# Patient Record
Sex: FEMALE | Race: WHITE | Employment: FULL TIME | URBAN - METROPOLITAN AREA
[De-identification: names, ages, dates, MRNs, and addresses within clinical notes are randomized per-mention and may not be internally consistent; named-entity substitution may affect disease eponyms.]

---

## 2023-02-17 ENCOUNTER — APPOINTMENT (OUTPATIENT)
Dept: CT IMAGING | Facility: HOSPITAL | Age: 73
End: 2023-02-17
Attending: EMERGENCY MEDICINE
Payer: MEDICARE

## 2023-02-17 ENCOUNTER — HOSPITAL ENCOUNTER (EMERGENCY)
Facility: HOSPITAL | Age: 73
Discharge: HOME | End: 2023-02-17
Attending: EMERGENCY MEDICINE
Payer: MEDICARE

## 2023-02-17 VITALS
HEIGHT: 62 IN | TEMPERATURE: 98 F | OXYGEN SATURATION: 96 % | RESPIRATION RATE: 20 BRPM | WEIGHT: 205 LBS | SYSTOLIC BLOOD PRESSURE: 158 MMHG | HEART RATE: 96 BPM | BODY MASS INDEX: 37.73 KG/M2 | DIASTOLIC BLOOD PRESSURE: 67 MMHG

## 2023-02-17 DIAGNOSIS — M47.24 THORACIC RADICULOPATHY DUE TO OSTEOARTHRITIS OF SPINE: Primary | ICD-10-CM

## 2023-02-17 LAB
ALBUMIN SERPL-MCNC: 3.7 G/DL (ref 3.4–5)
ALBUMIN/GLOB SERPL: 0.9 {RATIO} (ref 1–2)
ALP LIVER SERPL-CCNC: 193 U/L
ALT SERPL-CCNC: 29 U/L
ANION GAP SERPL CALC-SCNC: 3 MMOL/L (ref 0–18)
AST SERPL-CCNC: 37 U/L (ref 15–37)
BASOPHILS # BLD AUTO: 0.04 X10(3) UL (ref 0–0.2)
BASOPHILS NFR BLD AUTO: 0.4 %
BILIRUB SERPL-MCNC: 0.7 MG/DL (ref 0.1–2)
BILIRUB UR QL STRIP.AUTO: NEGATIVE
BUN BLD-MCNC: 27 MG/DL (ref 7–18)
CALCIUM BLD-MCNC: 9.3 MG/DL (ref 8.5–10.1)
CHLORIDE SERPL-SCNC: 109 MMOL/L (ref 98–112)
CLARITY UR REFRACT.AUTO: CLEAR
CO2 SERPL-SCNC: 24 MMOL/L (ref 21–32)
COLOR UR AUTO: YELLOW
CREAT BLD-MCNC: 1.05 MG/DL
EOSINOPHIL # BLD AUTO: 0.26 X10(3) UL (ref 0–0.7)
EOSINOPHIL NFR BLD AUTO: 2.9 %
ERYTHROCYTE [DISTWIDTH] IN BLOOD BY AUTOMATED COUNT: 13.1 %
GFR SERPLBLD BASED ON 1.73 SQ M-ARVRAT: 57 ML/MIN/1.73M2 (ref 60–?)
GLOBULIN PLAS-MCNC: 4 G/DL (ref 2.8–4.4)
GLUCOSE BLD-MCNC: 159 MG/DL (ref 70–99)
GLUCOSE UR STRIP.AUTO-MCNC: NEGATIVE MG/DL
HCT VFR BLD AUTO: 41.4 %
HGB BLD-MCNC: 13.6 G/DL
IMM GRANULOCYTES # BLD AUTO: 0.04 X10(3) UL (ref 0–1)
IMM GRANULOCYTES NFR BLD: 0.4 %
INR BLD: 1.06 (ref 0.85–1.16)
KETONES UR STRIP.AUTO-MCNC: NEGATIVE MG/DL
LEUKOCYTE ESTERASE UR QL STRIP.AUTO: NEGATIVE
LIPASE SERPL-CCNC: 17 U/L (ref 13–75)
LIPASE SERPL-CCNC: 63 U/L (ref 73–393)
LYMPHOCYTES # BLD AUTO: 0.85 X10(3) UL (ref 1–4)
LYMPHOCYTES NFR BLD AUTO: 9.4 %
MCH RBC QN AUTO: 31.1 PG (ref 26–34)
MCHC RBC AUTO-ENTMCNC: 32.9 G/DL (ref 31–37)
MCV RBC AUTO: 94.5 FL
MONOCYTES # BLD AUTO: 0.77 X10(3) UL (ref 0.1–1)
MONOCYTES NFR BLD AUTO: 8.5 %
NEUTROPHILS # BLD AUTO: 7.11 X10 (3) UL (ref 1.5–7.7)
NEUTROPHILS # BLD AUTO: 7.11 X10(3) UL (ref 1.5–7.7)
NEUTROPHILS NFR BLD AUTO: 78.4 %
NITRITE UR QL STRIP.AUTO: NEGATIVE
OSMOLALITY SERPL CALC.SUM OF ELEC: 290 MOSM/KG (ref 275–295)
PH UR STRIP.AUTO: 5 [PH] (ref 5–8)
PLATELET # BLD AUTO: 261 10(3)UL (ref 150–450)
POTASSIUM SERPL-SCNC: 5 MMOL/L (ref 3.5–5.1)
PROT SERPL-MCNC: 7.7 G/DL (ref 6.4–8.2)
PROT UR STRIP.AUTO-MCNC: NEGATIVE MG/DL
PROTHROMBIN TIME: 13.8 SECONDS (ref 11.6–14.8)
RBC # BLD AUTO: 4.38 X10(6)UL
RBC UR QL AUTO: NEGATIVE
SARS-COV-2 RNA RESP QL NAA+PROBE: NOT DETECTED
SODIUM SERPL-SCNC: 136 MMOL/L (ref 136–145)
SP GR UR STRIP.AUTO: 1.02 (ref 1–1.03)
UROBILINOGEN UR STRIP.AUTO-MCNC: <2 MG/DL
WBC # BLD AUTO: 9.1 X10(3) UL (ref 4–11)

## 2023-02-17 PROCEDURE — 81003 URINALYSIS AUTO W/O SCOPE: CPT | Performed by: EMERGENCY MEDICINE

## 2023-02-17 PROCEDURE — 85610 PROTHROMBIN TIME: CPT | Performed by: EMERGENCY MEDICINE

## 2023-02-17 PROCEDURE — 85025 COMPLETE CBC W/AUTO DIFF WBC: CPT

## 2023-02-17 PROCEDURE — 99285 EMERGENCY DEPT VISIT HI MDM: CPT

## 2023-02-17 PROCEDURE — 96374 THER/PROPH/DIAG INJ IV PUSH: CPT

## 2023-02-17 PROCEDURE — 80053 COMPREHEN METABOLIC PANEL: CPT | Performed by: EMERGENCY MEDICINE

## 2023-02-17 PROCEDURE — 80053 COMPREHEN METABOLIC PANEL: CPT

## 2023-02-17 PROCEDURE — 83690 ASSAY OF LIPASE: CPT | Performed by: EMERGENCY MEDICINE

## 2023-02-17 PROCEDURE — 96375 TX/PRO/DX INJ NEW DRUG ADDON: CPT

## 2023-02-17 PROCEDURE — 81003 URINALYSIS AUTO W/O SCOPE: CPT

## 2023-02-17 PROCEDURE — 74174 CTA ABD&PLVS W/CONTRAST: CPT | Performed by: EMERGENCY MEDICINE

## 2023-02-17 PROCEDURE — 96361 HYDRATE IV INFUSION ADD-ON: CPT

## 2023-02-17 PROCEDURE — 85025 COMPLETE CBC W/AUTO DIFF WBC: CPT | Performed by: EMERGENCY MEDICINE

## 2023-02-17 PROCEDURE — 96376 TX/PRO/DX INJ SAME DRUG ADON: CPT

## 2023-02-17 PROCEDURE — 83690 ASSAY OF LIPASE: CPT

## 2023-02-17 PROCEDURE — 71275 CT ANGIOGRAPHY CHEST: CPT | Performed by: EMERGENCY MEDICINE

## 2023-02-17 RX ORDER — CYCLOBENZAPRINE HCL 10 MG
10 TABLET ORAL 3 TIMES DAILY PRN
Qty: 20 TABLET | Refills: 0 | Status: SHIPPED | OUTPATIENT
Start: 2023-02-17 | End: 2023-02-24

## 2023-02-17 RX ORDER — DIAZEPAM 5 MG/1
5 TABLET ORAL ONCE
Status: COMPLETED | OUTPATIENT
Start: 2023-02-17 | End: 2023-02-17

## 2023-02-17 RX ORDER — HYDROCODONE BITARTRATE AND ACETAMINOPHEN 5; 325 MG/1; MG/1
1-2 TABLET ORAL EVERY 6 HOURS PRN
Qty: 10 TABLET | Refills: 0 | Status: SHIPPED | OUTPATIENT
Start: 2023-02-17 | End: 2023-02-22

## 2023-02-17 RX ORDER — SODIUM CHLORIDE 9 MG/ML
INJECTION, SOLUTION INTRAVENOUS CONTINUOUS
Status: DISCONTINUED | OUTPATIENT
Start: 2023-02-17 | End: 2023-02-17

## 2023-02-17 RX ORDER — HYDROMORPHONE HYDROCHLORIDE 1 MG/ML
INJECTION, SOLUTION INTRAMUSCULAR; INTRAVENOUS; SUBCUTANEOUS
Status: COMPLETED
Start: 2023-02-17 | End: 2023-02-17

## 2023-02-17 RX ORDER — ONDANSETRON 2 MG/ML
4 INJECTION INTRAMUSCULAR; INTRAVENOUS ONCE
Status: COMPLETED | OUTPATIENT
Start: 2023-02-17 | End: 2023-02-17

## 2023-02-17 RX ORDER — METHYLPREDNISOLONE 4 MG/1
TABLET ORAL
Qty: 1 EACH | Refills: 0 | Status: SHIPPED | OUTPATIENT
Start: 2023-02-17

## 2023-02-17 RX ORDER — DEXAMETHASONE SODIUM PHOSPHATE 10 MG/ML
10 INJECTION, SOLUTION INTRAMUSCULAR; INTRAVENOUS ONCE
Status: COMPLETED | OUTPATIENT
Start: 2023-02-17 | End: 2023-02-17

## 2023-02-17 RX ORDER — HYDROMORPHONE HYDROCHLORIDE 1 MG/ML
0.5 INJECTION, SOLUTION INTRAMUSCULAR; INTRAVENOUS; SUBCUTANEOUS ONCE
Status: COMPLETED | OUTPATIENT
Start: 2023-02-17 | End: 2023-02-17

## 2023-02-17 RX ORDER — KETOROLAC TROMETHAMINE 15 MG/ML
15 INJECTION, SOLUTION INTRAMUSCULAR; INTRAVENOUS ONCE
Status: COMPLETED | OUTPATIENT
Start: 2023-02-17 | End: 2023-02-17

## 2023-02-17 NOTE — ED INITIAL ASSESSMENT (HPI)
Pt in with L flank pain x 1 week. No urinary S/S. Seen at [address] [place] [address] Saturday for same and diagnosed with UTI and given abx.

## 2023-02-18 NOTE — DISCHARGE INSTRUCTIONS
Bone spurring present in the spine may be causig these symptoms-   Call to make an appointment to follow with your physician and you may need to follow with spine surgery.   Dr Jose Gallardo is excellent and with [facility]

## 2023-07-13 ENCOUNTER — HOSPITAL ENCOUNTER (EMERGENCY)
Facility: HOSPITAL | Age: 73
Discharge: LEFT BEFORE DISCHARGE | End: 2023-07-13
Payer: MEDICARE

## 2023-07-13 ENCOUNTER — APPOINTMENT (OUTPATIENT)
Dept: GENERAL RADIOLOGY | Facility: HOSPITAL | Age: 73
End: 2023-07-13
Payer: MEDICARE

## 2023-07-13 VITALS
DIASTOLIC BLOOD PRESSURE: 48 MMHG | HEIGHT: 63 IN | OXYGEN SATURATION: 93 % | SYSTOLIC BLOOD PRESSURE: 94 MMHG | WEIGHT: 190 LBS | BODY MASS INDEX: 33.66 KG/M2 | HEART RATE: 113 BPM | RESPIRATION RATE: 19 BRPM | TEMPERATURE: 98 F

## 2023-07-13 PROCEDURE — 71045 X-RAY EXAM CHEST 1 VIEW: CPT

## 2023-07-13 RX ORDER — ATORVASTATIN CALCIUM 40 MG/1
40 TABLET, FILM COATED ORAL NIGHTLY
COMMUNITY

## 2023-07-13 RX ORDER — HYDROCODONE BITARTRATE AND ACETAMINOPHEN 5; 300 MG/1; MG/1
TABLET ORAL EVERY 4 HOURS PRN
COMMUNITY

## 2023-07-13 RX ORDER — AMLODIPINE BESYLATE 10 MG/1
10 TABLET ORAL DAILY
COMMUNITY
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
## 2023-12-18 RX ORDER — ASPIRIN 81 MG/1
81 TABLET ORAL DAILY
COMMUNITY
End: 2024-01-20

## 2023-12-18 RX ORDER — ACETAMINOPHEN AND CODEINE PHOSPHATE 300; 30 MG/1; MG/1
1 TABLET ORAL EVERY 4 HOURS PRN
Status: ON HOLD | COMMUNITY

## 2023-12-18 RX ORDER — DIPHENHYDRAMINE HCL 25 MG
25 CAPSULE ORAL EVERY 6 HOURS PRN
Status: ON HOLD | COMMUNITY

## 2024-01-04 ENCOUNTER — EKG ENCOUNTER (OUTPATIENT)
Dept: LAB | Age: 74
End: 2024-01-04
Attending: ORTHOPAEDIC SURGERY
Payer: MEDICARE

## 2024-01-04 ENCOUNTER — LAB ENCOUNTER (OUTPATIENT)
Dept: LAB | Age: 74
End: 2024-01-04
Attending: ORTHOPAEDIC SURGERY
Payer: MEDICARE

## 2024-01-04 DIAGNOSIS — Z01.818 PRE-OP TESTING: ICD-10-CM

## 2024-01-04 LAB
ALBUMIN SERPL-MCNC: 3.7 G/DL (ref 3.4–5)
ALBUMIN/GLOB SERPL: 1 {RATIO} (ref 1–2)
ALP LIVER SERPL-CCNC: 180 U/L
ALT SERPL-CCNC: 50 U/L
ANION GAP SERPL CALC-SCNC: 5 MMOL/L (ref 0–18)
ANTIBODY SCREEN: NEGATIVE
APTT PPP: 30.4 SECONDS (ref 23.3–35.6)
AST SERPL-CCNC: 23 U/L (ref 15–37)
ATRIAL RATE: 85 BPM
BASOPHILS # BLD AUTO: 0.04 X10(3) UL (ref 0–0.2)
BASOPHILS NFR BLD AUTO: 0.4 %
BILIRUB SERPL-MCNC: 0.5 MG/DL (ref 0.1–2)
BILIRUB UR QL STRIP.AUTO: NEGATIVE
BUN BLD-MCNC: 29 MG/DL (ref 9–23)
CALCIUM BLD-MCNC: 9.6 MG/DL (ref 8.5–10.1)
CHLORIDE SERPL-SCNC: 114 MMOL/L (ref 98–112)
CLARITY UR REFRACT.AUTO: CLEAR
CO2 SERPL-SCNC: 21 MMOL/L (ref 21–32)
CREAT BLD-MCNC: 0.98 MG/DL
EGFRCR SERPLBLD CKD-EPI 2021: 61 ML/MIN/1.73M2 (ref 60–?)
EOSINOPHIL # BLD AUTO: 0.44 X10(3) UL (ref 0–0.7)
EOSINOPHIL NFR BLD AUTO: 4.8 %
ERYTHROCYTE [DISTWIDTH] IN BLOOD BY AUTOMATED COUNT: 13.2 %
FASTING STATUS PATIENT QL REPORTED: YES
GLOBULIN PLAS-MCNC: 3.7 G/DL (ref 2.8–4.4)
GLUCOSE BLD-MCNC: 113 MG/DL (ref 70–99)
GLUCOSE UR STRIP.AUTO-MCNC: NORMAL MG/DL
HCT VFR BLD AUTO: 41.9 %
HGB BLD-MCNC: 13.4 G/DL
IMM GRANULOCYTES # BLD AUTO: 0.04 X10(3) UL (ref 0–1)
IMM GRANULOCYTES NFR BLD: 0.4 %
INR BLD: 1.09 (ref 0.8–1.2)
KETONES UR STRIP.AUTO-MCNC: NEGATIVE MG/DL
LEUKOCYTE ESTERASE UR QL STRIP.AUTO: 250
LYMPHOCYTES # BLD AUTO: 1.48 X10(3) UL (ref 1–4)
LYMPHOCYTES NFR BLD AUTO: 16.2 %
MCH RBC QN AUTO: 31.2 PG (ref 26–34)
MCHC RBC AUTO-ENTMCNC: 32 G/DL (ref 31–37)
MCV RBC AUTO: 97.7 FL
MONOCYTES # BLD AUTO: 0.76 X10(3) UL (ref 0.1–1)
MONOCYTES NFR BLD AUTO: 8.3 %
NEUTROPHILS # BLD AUTO: 6.37 X10 (3) UL (ref 1.5–7.7)
NEUTROPHILS # BLD AUTO: 6.37 X10(3) UL (ref 1.5–7.7)
NEUTROPHILS NFR BLD AUTO: 69.9 %
NITRITE UR QL STRIP.AUTO: NEGATIVE
OSMOLALITY SERPL CALC.SUM OF ELEC: 297 MOSM/KG (ref 275–295)
P AXIS: 52 DEGREES
P-R INTERVAL: 150 MS
PH UR STRIP.AUTO: 5 [PH] (ref 5–8)
PLATELET # BLD AUTO: 211 10(3)UL (ref 150–450)
POTASSIUM SERPL-SCNC: 4.3 MMOL/L (ref 3.5–5.1)
PROT SERPL-MCNC: 7.4 G/DL (ref 6.4–8.2)
PROT UR STRIP.AUTO-MCNC: NEGATIVE MG/DL
PROTHROMBIN TIME: 14.1 SECONDS (ref 11.6–14.8)
Q-T INTERVAL: 392 MS
QRS DURATION: 74 MS
QTC CALCULATION (BEZET): 466 MS
R AXIS: -1 DEGREES
RBC # BLD AUTO: 4.29 X10(6)UL
RBC UR QL AUTO: NEGATIVE
RH BLOOD TYPE: POSITIVE
SODIUM SERPL-SCNC: 140 MMOL/L (ref 136–145)
SP GR UR STRIP.AUTO: 1.01 (ref 1–1.03)
T AXIS: 31 DEGREES
UROBILINOGEN UR STRIP.AUTO-MCNC: NORMAL MG/DL
VENTRICULAR RATE: 85 BPM
WBC # BLD AUTO: 9.1 X10(3) UL (ref 4–11)

## 2024-01-04 PROCEDURE — 86900 BLOOD TYPING SEROLOGIC ABO: CPT

## 2024-01-04 PROCEDURE — 85610 PROTHROMBIN TIME: CPT

## 2024-01-04 PROCEDURE — 87147 CULTURE TYPE IMMUNOLOGIC: CPT

## 2024-01-04 PROCEDURE — 87088 URINE BACTERIA CULTURE: CPT

## 2024-01-04 PROCEDURE — 87086 URINE CULTURE/COLONY COUNT: CPT

## 2024-01-04 PROCEDURE — 87186 SC STD MICRODIL/AGAR DIL: CPT

## 2024-01-04 PROCEDURE — 86850 RBC ANTIBODY SCREEN: CPT

## 2024-01-04 PROCEDURE — 86901 BLOOD TYPING SEROLOGIC RH(D): CPT

## 2024-01-04 PROCEDURE — 87081 CULTURE SCREEN ONLY: CPT

## 2024-01-04 PROCEDURE — 85730 THROMBOPLASTIN TIME PARTIAL: CPT

## 2024-01-04 PROCEDURE — 85025 COMPLETE CBC W/AUTO DIFF WBC: CPT

## 2024-01-04 PROCEDURE — 81001 URINALYSIS AUTO W/SCOPE: CPT

## 2024-01-04 PROCEDURE — 93005 ELECTROCARDIOGRAM TRACING: CPT

## 2024-01-04 PROCEDURE — 36415 COLL VENOUS BLD VENIPUNCTURE: CPT

## 2024-01-04 PROCEDURE — 93010 ELECTROCARDIOGRAM REPORT: CPT | Performed by: INTERNAL MEDICINE

## 2024-01-04 PROCEDURE — 80053 COMPREHEN METABOLIC PANEL: CPT

## 2024-01-17 RX ORDER — BISACODYL 10 MG
10 SUPPOSITORY, RECTAL RECTAL
Status: CANCELLED | OUTPATIENT
Start: 2024-01-17

## 2024-01-17 RX ORDER — OXYCODONE HYDROCHLORIDE 5 MG/1
10 TABLET ORAL EVERY 4 HOURS PRN
Status: CANCELLED | OUTPATIENT
Start: 2024-01-17

## 2024-01-17 RX ORDER — METHOCARBAMOL 500 MG/1
500 TABLET, FILM COATED ORAL 3 TIMES DAILY
Status: CANCELLED | OUTPATIENT
Start: 2024-01-17

## 2024-01-17 RX ORDER — METOCLOPRAMIDE HYDROCHLORIDE 5 MG/ML
10 INJECTION INTRAMUSCULAR; INTRAVENOUS EVERY 8 HOURS PRN
Status: CANCELLED | OUTPATIENT
Start: 2024-01-17

## 2024-01-17 RX ORDER — HYDROMORPHONE HYDROCHLORIDE 1 MG/ML
1 INJECTION, SOLUTION INTRAMUSCULAR; INTRAVENOUS; SUBCUTANEOUS EVERY 2 HOUR PRN
Status: CANCELLED | OUTPATIENT
Start: 2024-01-17

## 2024-01-17 RX ORDER — DIPHENHYDRAMINE HCL 25 MG
25 CAPSULE ORAL EVERY 4 HOURS PRN
Status: CANCELLED | OUTPATIENT
Start: 2024-01-17

## 2024-01-17 RX ORDER — DOCUSATE SODIUM 100 MG/1
100 CAPSULE, LIQUID FILLED ORAL 2 TIMES DAILY
Status: CANCELLED | OUTPATIENT
Start: 2024-01-17

## 2024-01-17 RX ORDER — SENNOSIDES 8.6 MG
17.2 TABLET ORAL NIGHTLY
Status: CANCELLED | OUTPATIENT
Start: 2024-01-17

## 2024-01-17 RX ORDER — OXYCODONE HYDROCHLORIDE 5 MG/1
15 TABLET ORAL EVERY 4 HOURS PRN
Status: CANCELLED | OUTPATIENT
Start: 2024-01-17

## 2024-01-17 RX ORDER — ONDANSETRON 2 MG/ML
4 INJECTION INTRAMUSCULAR; INTRAVENOUS EVERY 6 HOURS PRN
Status: CANCELLED | OUTPATIENT
Start: 2024-01-17

## 2024-01-17 RX ORDER — POLYETHYLENE GLYCOL 3350 17 G/17G
17 POWDER, FOR SOLUTION ORAL DAILY PRN
Status: CANCELLED | OUTPATIENT
Start: 2024-01-17

## 2024-01-17 RX ORDER — HYDROMORPHONE HYDROCHLORIDE 1 MG/ML
0.5 INJECTION, SOLUTION INTRAMUSCULAR; INTRAVENOUS; SUBCUTANEOUS EVERY 2 HOUR PRN
Status: CANCELLED | OUTPATIENT
Start: 2024-01-17

## 2024-01-17 RX ORDER — CEFAZOLIN SODIUM/WATER 2 G/20 ML
2 SYRINGE (ML) INTRAVENOUS EVERY 8 HOURS
Status: CANCELLED | OUTPATIENT
Start: 2024-01-17 | End: 2024-01-18

## 2024-01-17 RX ORDER — DIPHENHYDRAMINE HYDROCHLORIDE 50 MG/ML
25 INJECTION INTRAMUSCULAR; INTRAVENOUS EVERY 4 HOURS PRN
Status: CANCELLED | OUTPATIENT
Start: 2024-01-17

## 2024-01-17 RX ORDER — ENEMA 19; 7 G/133ML; G/133ML
1 ENEMA RECTAL ONCE AS NEEDED
Status: CANCELLED | OUTPATIENT
Start: 2024-01-17

## 2024-01-17 RX ORDER — SODIUM CHLORIDE, SODIUM LACTATE, POTASSIUM CHLORIDE, CALCIUM CHLORIDE 600; 310; 30; 20 MG/100ML; MG/100ML; MG/100ML; MG/100ML
INJECTION, SOLUTION INTRAVENOUS CONTINUOUS
Status: CANCELLED | OUTPATIENT
Start: 2024-01-17

## 2024-01-17 NOTE — H&P
Patient: Gloria Mccrary  Medical Record Number: [id number]  Referring Physician:  No ref. provider found  PCP: Gordo Antonio MD        HISTORY OF CHIEF COMPLAINT:    Gloria Mccrary is a 73 year old female, with prior  Lami and  L3-5 fusion who presents today for pre-op visit.      VAS Pain Score: 7 /10                Past Medical History:   Diagnosis Date    ASTHMA      Complex partial seizure disorder (HCC)      DEPRESSION      HYPERLIPIDEMIA      HYPERTENSION      NAVEEN (obstructive sleep apnea) [facility] 7-6-10      AHI 17  RDI 20  REM AHI 34  YULY 76%, repeat PSG without NAVEEN.    OSTEOARTHRITIS              Past Surgical History:   Procedure Laterality Date    BENIGN BIOPSY LEFT         15-20 years ago    COLONOSCOPY        COLONOSCOPY,BIOPSY   07     Performed by MARY JO RUSH at [facility], [facility]    HYSTERECTOMY        KNEE REPLACEMENT SURGERY         Right 2010    KNEE REPLACEMENT SURGERY Left      Total    LUMBAR SPINE FUSION,ANTER APPRCH               Family History   Problem Relation Age of Onset    Hypertension Mother      Diabetes Mother      Stroke Mother      Cancer Father           NHL    Hypertension Sister      Other (Sarcoidosis) Brother      Breast Cancer Maternal Grandmother 40         Age at dx 40s    Breast Cancer Other 60         mat aunt      Social History    Socioeconomic History      Marital status:       Number of children: 0    Occupational History      Occupation: pharmacy tech        Employer: [employer]    Tobacco Use      Smoking status: Former        Packs/day: 1.00        Years: 20.00        Additional pack years: 0.00        Total pack years: 20.00        Types: Cigarettes        Quit date: 1990        Years since quittin.8      Smokeless tobacco: Never    Vaping Use      Vaping Use: Never used    Substance and Sexual Activity      Alcohol use: No      Drug use: No      Current Medications:         Current Outpatient  Medications   Medication Sig Dispense Refill    atorvastatin 40 MG Oral Tab Take 1 tablet (40 mg total) by mouth daily. 90 tablet 0    acetaminophen-codeine (TYLENOL WITH CODEINE #3) 300-30 MG Oral Tab Take 1 tablet by mouth every 6 (six) hours as needed for Pain. 30 tablet 0    tiZANidine 2 MG Oral Tab TAKE ONE TABLET IN THE MORNING AND 2 TABLETS BY MOUTH IN THE EVENING AS NEEDED FOR SPASMS OR PAIN 270 tablet 0    amLODIPine 10 MG Oral Tab Take 1 tablet (10 mg total) by mouth daily. 90 tablet 0    losartan 100 MG Oral Tab Take 1 tablet (100 mg total) by mouth daily. 90 tablet 0    levothyroxine 75 MCG Oral Tab Take 1 tablet (75 mcg total) by mouth daily. 90 tablet 0    DULoxetine 20 MG Oral Cap DR Particles Take 1 capsule (20 mg total) by mouth daily. 90 capsule 0    omega-3 fatty acids 1000 MG Oral Cap Take 1,000 mg by mouth daily.        B Complex-C-Folic Acid (HM VITAMIN B COMPLEX/VITAMIN C) Oral Tab Take by mouth.        predniSONE 5 MG Oral Tab Take 1 tablet (5 mg total) by mouth daily. Start at 30 mg and decrease by 5 mg daily then stop 21 tablet 0    HYDROcodone-acetaminophen 5-325 MG Oral Tab Take 1 tablet by mouth every 4 (four) hours as needed for Pain. 10 tablet 0    Olopatadine HCl 0.2 % Ophthalmic Solution Apply 1 drop to eye daily. 1 drop to both eyes once daily 2.5 mL 3    Fluticasone-Salmeterol 250-50 MCG/ACT Inhalation Aerosol Powder, Breath Activated INHALE ONE PUFF BY MOUTH TWICE A  each 2    aspirin 81 MG Oral Tab EC Take 81 mg by mouth daily.        albuterol (PROAIR HFA) 108 (90 Base) MCG/ACT Inhalation Aero Soln Inhale 2 puffs into the lungs every 6 (six) hours as needed for Wheezing. 18 g 0    Multiple Vitamins-Minerals (OCUTABS-LUTEIN) Oral Tab Take by mouth.             REVIEW OF SYSTEMS     A comprehensive 10 point review of systems was completed.  Pertinent positives and negatives noted in the the HPI.      PHYSICAL EXAMIMATION   PHYSICAL EXAMINATION: Gloria Mccrary is a 73 year  old female who is observed sitting in the exam room alert and oriented times three.   RESPIRATORY RATE: 18 She looks consistent her stated age.    Resp 24   The patient is well developed, appropriately built      Ambulation: Patient displays antalgic gait, weakness with hip flexion      Clinical Spinal Alignment: Overall well aligned     ROM:               ROM testing demonstrates decreased     Palpation:       Patient demonstrates TTP abdomen      Strength:        Patient demonstrates normal motor examination 5/5 except for weakness 3+/5 bilateral hip flexion.      Sensation:  Patient demonstrates normal sensory examination 2/2  except decreased bilateral anterior thigh     Reflexes:        Patient demonstrates 2+ reflexes      Straight Leg Testing: Was neg     Special Tests:      Test Right   (POS or NEG) Left   (POS or NEG)                           Clonus Neg Neg      EYES: EOMI, JANETTE  SKIN EXAM: Skin is intact, head, neck, trunk and arms/legs. No rashes, mottling or ulcerations.  LYMPH EXAM: There is no edema in bilateral lower extremities.  VASCULAR EXAM:  pulses are normal bilaterally, with good distal perfusion. No clubbing or cyanosis. Calves supple, NT   ABDOMINAL EXAM: Abdomen is soft, NT, BS present  MUSCULOSKELETAL: Outlined above        IMAGING STUDIES:  Imaging studies reviewed with the patient today  XR: 4 views L spine - prior L2-L5 instrumentation with notable adjacent segment degeneration with coronal deformity above the fusion and disc height loss at L2-3.   MRI:DATE OF SERVICE: 08.31.2023   MRI SPINE THORACIC (CPT=72146), MRI SPINE LUMBAR (CPT=72148)      CLINICAL INDICATION: Low back pain, unspecified back pain laterality, unspecified chronicity,   unspecified whether sciatica present. Left flank pain.      TECHNIQUE: Multiplanar multisequence MR images of the thoracic spine and lumbar spine were obtained   without contrast.      COMPARISON STUDIES: Lumbar spine radiographs dated 3/3/2023.  Lumbar spine MRI dated 12/29/2021.      FINDINGS:         THORACIC SPINE:      NUMBERING: Based on counting up from the lumbosacral junction on the accompanying lumbar spine MRI,   with an assumption of 5 lumbar vertebrae and 12 thoracic vertebrae.      ALIGNMENT: Mild to moderate dextroconvex scoliosis of the mid thoracic spine. No gross listhesis.      OSSEOUS STRUCTURES: No vertebral body compression fracture. Mild-to-moderate Modic type I and type   II endplate marrow changes noted at multiple disc levels, greatest at T10-T11. Prominent vertebral   body hemangiomas seen at multiple levels, particularly at T1, T2 and T10.      INTERVERTEBRAL DISCS: Moderate to marked narrowing of multiple thoracic discs, particularly in the   upper and mid thoracic spine.      THORACIC SPINAL CORD: Normal in signal and in caliber.      THORACIC LEVELS:   There is a mild diffuse disc bulge at T1-T2, a mild diffuse disc bulge at T2-T3, a mild diffuse disc   bulge at T4-T5, a mild diffuse left eccentric disc bulge at T6-T7, a small shallow left parasagittal   disc protrusion at T7-T8, a minimal diffuse disc bulge at T8-T9, a minimal diffuse disc bulge at   T9-T10, a moderate diffuse left eccentric disc bulge at T10-T11, and a mild diffuse disc bulge at   T11-T12, each causing flattening of the ventral thecal sac. The largest disc bulge at T10-T11 abuts   the ventral surface of the cord. There is moderate facet hypertrophy at T10-T11, abutting the right   dorsal surface of the cord and combining with the bulging disc at this level to cause moderate   central canal stenosis. There is mild facet hypertrophy at additional thoracic levels. There is no   significant central canal stenosis at any other thoracic level. There is no definite high-grade   neural foraminal narrowing at any level of the thoracic spine.         LUMBAR SPINE:      NUMBERING: Five nonrib-bearing lumbar type vertebrae, as demonstrated on comparison radiographs.    Conus lies at the inferior L1 level.      ALIGNMENT: Mild levoconvex curvature of the lower lumbar spine with apex at L4. Grade 1 degenerative   retrolisthesis of L1 on L2 and L2 on L3.      POSTSURGICAL CHANGES: Laminectomies and discectomies with anterior and posterior fusion at L3-L4-L5.   Dual posterior rods span the fused levels, and are affixed with bilateral transpedicular screws   through the L3, L4 and L5 vertebrae. Bone graft is situated in the L3-L4 and L4-L5 disc spaces,   without evidence of extra-disc migration. There appears to be solid osseous fusion across the L3-L4   and L4-L5 disc spaces. Evaluation of the posterior fusion hardware itself and of the posterior   fusion status is precluded by susceptibility artifact.      OSSEOUS STRUCTURES: No vertebral body compression fracture. Mild-to-moderate Modic type I and type   II endplate marrow changes noted at multiple disc levels, greatest at L2-L3.        INTERVERTEBRAL DISCS: Marked desiccation and marked narrowing of the L1-L2, L2-L3 and L5-S1 discs,   with degenerative partial autofusion across the L5-S1 disc.      INDIVIDUAL LUMBAR LEVELS:      T12-L1: A small midline cranial disc extrusion indents the ventral thecal sac. Mild facet   hypertrophy is noted. No central canal stenosis or neural foraminal narrowing.      L1-L2: A mild-to-moderate diffuse disc/osteophyte complex combines with mild-to-moderate   facet/ligamentum flavum hypertrophy to cause mild-to-moderate central canal stenosis. Neural   foraminal narrowing is minimal bilaterally.      L2-L3: A mild-to-moderate diffuse disc/osteophyte complex combines with mild-to-moderate   facet/ligamentum flavum hypertrophy to cause mild central canal stenosis. Neural foraminal narrowing   is moderate bilaterally. A focal left foraminal disc extrusion abuts the exiting left L2 nerve root.      L3-L4: At this fused and posteriorly decompressed level, there is no central canal stenosis or   neural  foraminal narrowing.      L4-L5: At this fused and posteriorly decompressed level, there is no central canal stenosis or   neural foraminal narrowing.      L5-S1: A mild broad-based left eccentric disc/osteophyte complex flattens the ventral thecal sac.   Mild-to-moderate facet hypertrophy is noted. No central canal stenosis. Neural foraminal narrowing   is minimal bilaterally, secondary to marginal bony proliferation.      Impression  IMPRESSION:      1. 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 There is moderate central canal stenosis   at the T10-T11 level. There is no gross high-grade neural foraminal narrowing at any thoracic level.   2. Multilevel degenerative disease of the lumbar spine, with postsurgical changes of fusion at   L3-L4-L5. There is mild-to-moderate central canal stenosis at L1-L2 and mild central canal stenosis   at L2-L3. There is moderate bilateral neural foraminal narrowing at L2-L3, and a focal left   foraminal disc extrusion at this level abuts the exiting left L2 nerve root. There is minimal neural   foraminal narrowing at the L1-L2 and L5-S1 levels.        CT:                                                                                     MEDICAL DECISION MAKING:            Gloria Mccrary is a 73 year old with Prior L3-5 fusion who presents with the above history, PE, and imaging findings.      I discussed the nature of his condition in extensive detail and treatment options including surgical verus non surgical options.We explored various non-surgical treatment options.       I reviewed the imaging and physical exam findings with the patient.  We discussed the options of living with the symptoms, continued nonoperative management, or proceeding to surgical intervention.  Gloria Mccrary has had a limitation in age appropriate activities of daily living due to this condition including employment.  Since Gloria Mccrary has not had any improvement with nonoperative  management for more than 6 months including physical therapy, medications and injections, Gloriadylan Mccrary would like to consider surgical intervention.  I reviewed the risks and benefits of the surgery including but no limited to infection, incidental durotomy, neuropraxia, worsening symptoms, incomplete pain relief, DVT, PE, and complications related to general anesthesia.  We discussed pre-operative clearance, as well as post-acute care planning of home with services.  We discussed that the estimated length of hospital stay would be 1-2 days.  I printed my pre-operative guided and gave it to the patient for review. The patient will consider the surgery, review with family, and follow up or call if she would like to schedule the surgery.  All of the patient's questions have been sought and answered.   plan for  Left L2-3 lateral interbody fusion and PSF L2-3 with SHYANNE L3-L5 and re-instrumentation      The patient indicates understanding of these issues and agrees to the plan.     Thank you very much.      Respectfully yours,        Today I spent a total of 50 minutes evaluating the patient, ordering medication and/or tests, charting, reviewing records and/or imaging, and discussing treatment plan.            Sharon Gomez MD   Minimally Invasive and Complex Spine Surgery Specialist  [facility]

## 2024-01-17 NOTE — DISCHARGE INSTRUCTIONS
Sometimes managing your health at home requires assistance.  The [facility] team has recognized your preference to use [facility].  They can be reached by phone at [phone].  The fax number for your reference is [phone].  A representative from the home health agency will contact you or your family to schedule your first visit.      Sharon Gomez MD        Lumbar and Thoracic Spinal Fusion Discharge Instructions   Spine Center Telephone Number: [phone]  Activity  Mobility  Walking is highly encouraged throughout the day, as tolerated.   Smaller distances are more easily tolerated.  Your goal is to increase the distance you walk each day.  Remember to listen to your body.  Exercise caution in adverse weather or terrain conditions.    Stairs  You may ascend and descend stairs as you tolerate.   Be safe and deliberate. Hold the handrail and advance one step at a time.  Avoid step-stools and ladders.     Restrictions  No twisting, pulling, pushing, or lifting items with a force greater than 10 pounds. (~Gallon of Milk)  No lifting objects above the level of your shoulders.  No bending with your back - you may bend at the knees and hips, maintaining a straight back.  Typically, your restrictions will be lessened at your 6-week follow-up appointment, however we appreciate your compliance with restrictions until directed otherwise.   If you have been provided a walking aid such as a walker or cane, please use as directed.    Sitting  Fatigue is common after surgery and you may find respite in sitting. This is normal and encouraged. Please remember to be deliberately mobile throughout the day. Change your position frequently, as your muscles may get sore and stiff without movement.    It is recommended to sit in a chair which allows your knees to be at about the same level as your hips. This makes rising from a seated position more feasible.   Avoid overstuffed or plush  chairs. Medium to firm chairs with straight backs give better support.   Recliners are OK but you may need to add pillows to avoid sinking into the chair.  Use a raised toilet seat if needed.  Change position every 20-30 minutes throughout the day (example: after sitting, stand, then you can sit again for another 20-30 minutes).    Sexual Activity  You may resume presurgical sexual activity two weeks following surgery as tolerated and when approved by your surgeon.  Discontinue sexual activity if it causes or exacerbates your pain.     Exercise/Fitness Activity  Do not participate in this activity during the two weeks following surgery, unless instructed otherwise.  Your surgeon will lessen restrictions and progress your activity level when appropriate.     Driving  You may NOT drive while taking narcotics or muscle relaxants.  Back and leg pain have been shown to decrease breaking reaction time, particularly after surgery, however, there is no specific time to return to driving.   When you feel able and safe to drive you may return to driving. Slowly advance the complexity of your driving from short distance driving on local streets and in parking lots to longer distance driving including highway driving.     Travel  Avoid air travel and long-distance automobile travel the first two weeks following surgery.     Bracing/Corset  You may not require an immobilizer or brace for your back unless your surgeon has indicated otherwise.   If you have been provided a brace or corset  Use when out of bed except when showering.  You may wear even when toileting.  Anticipate wearing for 6-12 weeks after surgery; exact time to be determined by surgeon.   Apply/remove your brace when sitting/standing at side of your bed.     Sleeping  You will require plenty of rest and sleep after surgery.   Use the position that provides you the most comfort.  You may use a pillow under knees, between legs, or behind back (if lying on your  side), for comfort.   Log roll to turn and rise from a recumbent position.   You may have difficulty sleeping at night. This is not abnormal. If you experience this, try to avoid napping during the day.   It is common to fatigue easily after surgery, this will typically improve one month after your surgery.   Maintain clean sheets and pillow cases.   Rub, with a clean towel to dry.     Return to work  When cleared by surgeon. Discuss specific work activities with your surgeon at your follow-up visit.  Light/sedentary type work may be possible 2 weeks post-op.  Most work activities are permitted approximately 6-12 weeks after surgery.     Dressing and Wound Care  You will have a water-resistant, clear, adhesive dressing over your wound. Please leave this dressing in place for 5 days after surgery.   Remove dressing 5 days after surgery and inspect your wound. You will find glue and mesh tape over your wound, please leave these intact. The dressing should remain in place after discharge from the hospital, as long as it is intact, with a good seal, and there is no leakage.    Start dressing changes 5 days after leaving the hospital with gauze and tegaderm.  Change as needed for moisture in the dressing.    You may start showering 3 days after leaving the hospital.  Keep all dressings on while taking a shower, then place a clean and dry dressing after showering. Do not scrub over the dressing.  Always wash hands before and after dressing changes.   Watch incision for any worsening redness, increased drainage, increased warmth or opening of the incision.  Call your surgeon’s office/answering service if you notice any of these.  Do not apply lotions or ointments on or near incision.    Bathing  You may shower over the adhesive dressing (Tegaderm) that is in place starting 3 days after surgery.  Have someone inspect your dressing prior to each shower to ensure the integrity of the seal at the dressing edges. If the bandage  integrity is compromised, please apply an additional adhesive over the existing dressing.   Do not submerge your wound. No tub bathing, swimming, use of steam rooms or saunas for 6 weeks following surgery and when permitted by your surgeon.  Do not scrub your incision site however you may allow soapy water to run over the site after your first post-operative visit.   Dab the site dry with a clean towel.    Diet and Constipation Prevention  Your appetite may be poor. Your desire for food will return.  Drink plenty of liquids (water, juice) each day to prevent dehydration.  Maintain a healthy, well balanced diet that includes plenty of protein following surgery.  Foods that are high in fiber (bran, vegetables, fruit) are good ways to prevent constipation.   Use an over the counter stool softener while taking narcotics to prevent constipation; the use of medications such a Miralax or Milk of Magnesia may be needed.  An enema or glycerin suppository may be necessary if above measures do not work.  Contact your pharmacist, family doctor, or surgeon for advice.    No Smoking  Smoking will inhibit healing.  Smoking has been clearly shown to inhibit solid bony fusion and is counterproductive to the nature of your surgery.   Even one cigarette a day may cause problems.  Vaping, chewing tobacco, nicotine gum and patches will also inhibit healing.    Pain Management  Expectations  You will have incisional site pain. This is normal and expected after surgery.   You may continue to have leg symptoms which should improve over time.   It is not uncommon 48-72 hours after surgery for patients to experience worsening pain symptoms or return of leg pain/symptoms as post-surgical inflammation sets in. Do not be alarmed as this should gradually improve.   Pain after surgery is normal.  You may have some return of your pre-surgery symptoms and this is normal. Overall your pain should slowly decrease.  You may have good and bad  days.  The pain medication Oxycodone may be taken every 4-6 hours as needed for pain.    You can take one extra strength (500mg) Acetaminophen every 6 hours as needed for pain, do not exceed 3500 mg per 24 hours.  If you received Norco instead of Oxycodone; you can take 1-2 tablets of Norco as needed for pain. Norco contains 325 mg of Acetaminophen, so factor that in when supplementing extra strength Tylenol (500mg) Acetaminophen, to not exceed the 3500mg limit per 24 hours.  The muscle relaxer (Flexeril) should be taken 1-3 times daily as needed for muscle tightness and spasm.  You MUST NOT take any anti-inflammatory pain medications for 3-6 months after surgery as these can prevent spinal fusion.  Which include: Aspirin, Motrin, Advil, Aleve, Naprosyn, Voltaren, Mobic, Indocin, Meloxicam, Ibuprofen, Indomethacin, Naproxen, Diclofenac. (COMPLETE LIST IN GUIDEBOOK)      Non-medication Based Techniques  Relaxation techniques  A way to focus your attention other than on your pain. You are innately capable of producing endorphins, a naturally occurring hormone, that can decrease pain. Some examples of relaxation techniques which may result in a release of endorphins include deep breathing, listening to music, prayer, or meditation.   The use of cold therapy for 20 minutes multiple times per day is encouraged.  You may apply heat to areas of muscle spasm only. Do not apply heat directly over your wound as this can lead to swelling and increased pain.   Gentle stretching may ease muscle spasm.  The idea is to “lengthen” the muscle that is in spasm. Avoid any aggressive bending, lifting, twisting with your neck or back. Gently bending and stretching is OK.  Gentle massage applied to the muscle spasm may help reduce discomfort.    Medication  Tylenol (acetaminophen) is another excellent medication for pain. Take caution as most narcotics contain acetaminophen. You may take a combined daily maximum 3.5 grams (3500mg) of  Tylenol (acetaminophen) in 24 hours. More than this can lead to liver injury.   You will be prescribed a narcotic medication. Take this as needed for breakthrough pain. Gradually wean yourself from prescription medication. You may substitute for Tylenol (acetaminophen).  Consider taking pain medication 30 minutes prior to activity to avoid incisional pain.   Take muscle relaxants as needed only if experiencing muscle spasm.  Please allow medications 30-45 minutes to take effect.  Do NOT drink alcohol while on pain medication.  Monitor need for pain medication refills closely.   Call your pharmacy or physician’s office at least five days before you run out of pain medication. If calling physician office after 3 pm, requests will be addressed on the next business day. Calls for refills on Fridays, holidays, and weekends may result in a processing delay of your refill until the next business day.     Post-op Office Visit  Patients are routinely seen 2 weeks, 6 weeks, 3 months, 6 months, and 1 year after surgery.   You will be scheduled for a post-surgical visit two weeks after surgery. Please confirm this appointment.  It is very important that you keep this appointment.  We recommend making a list of questions to bring with you as it is not uncommon for patients to forget questions while being seen.  Schedule a one week follow up with your Primary Care Physician. It is a good opportunity to review all your medications including any new additions we may provide.     When to contact your surgeon’s team:  Temperature > 101.5°F or 38.5°C.   Increasing pain, swelling, redness, odor, or drainage from your incision.  Separation of incision.  Sudden reappearance of pain that won’t go away with pain medication.  New numbness or weakness to arms or legs.  Difficulty urinating or if incontinence of your bowel.   Headaches that worsen when standing/sitting and resolve when laid flat.  Any concerns, unanswered questions, or new  problems.     Go directly to the ER or CALL 911 if you:  become short of breath  have chest pain  cough up blood  have unexplained anxiety with breathing

## 2024-01-18 ENCOUNTER — ANESTHESIA (OUTPATIENT)
Dept: SURGERY | Facility: HOSPITAL | Age: 74
End: 2024-01-18
Payer: MEDICARE

## 2024-01-18 ENCOUNTER — HOSPITAL ENCOUNTER (INPATIENT)
Facility: HOSPITAL | Age: 74
LOS: 2 days | Discharge: HOME HEALTH | End: 2024-01-20
Attending: ORTHOPAEDIC SURGERY | Admitting: ORTHOPAEDIC SURGERY
Payer: MEDICARE

## 2024-01-18 ENCOUNTER — APPOINTMENT (OUTPATIENT)
Dept: GENERAL RADIOLOGY | Facility: HOSPITAL | Age: 74
DRG: 455 | End: 2024-01-18
Attending: ORTHOPAEDIC SURGERY
Payer: MEDICARE

## 2024-01-18 ENCOUNTER — ANESTHESIA EVENT (OUTPATIENT)
Dept: SURGERY | Facility: HOSPITAL | Age: 74
End: 2024-01-18
Payer: MEDICARE

## 2024-01-18 ENCOUNTER — HOSPITAL ENCOUNTER (INPATIENT)
Facility: HOSPITAL | Age: 74
LOS: 2 days | Discharge: HOME HEALTH | DRG: 455 | End: 2024-01-20
Attending: ORTHOPAEDIC SURGERY | Admitting: ORTHOPAEDIC SURGERY
Payer: MEDICARE

## 2024-01-18 ENCOUNTER — APPOINTMENT (OUTPATIENT)
Dept: GENERAL RADIOLOGY | Facility: HOSPITAL | Age: 74
End: 2024-01-18
Attending: ORTHOPAEDIC SURGERY
Payer: MEDICARE

## 2024-01-18 DIAGNOSIS — M43.16 SPONDYLOLISTHESIS OF LUMBAR REGION: Primary | ICD-10-CM

## 2024-01-18 DIAGNOSIS — Z01.818 PRE-OP TESTING: ICD-10-CM

## 2024-01-18 PROCEDURE — 0SG00A0 FUSION OF LUMBAR VERTEBRAL JOINT WITH INTERBODY FUSION DEVICE, ANTERIOR APPROACH, ANTERIOR COLUMN, OPEN APPROACH: ICD-10-PCS | Performed by: ORTHOPAEDIC SURGERY

## 2024-01-18 PROCEDURE — 0QP004Z REMOVAL OF INTERNAL FIXATION DEVICE FROM LUMBAR VERTEBRA, OPEN APPROACH: ICD-10-PCS | Performed by: ORTHOPAEDIC SURGERY

## 2024-01-18 PROCEDURE — 0ST20ZZ RESECTION OF LUMBAR VERTEBRAL DISC, OPEN APPROACH: ICD-10-PCS | Performed by: ORTHOPAEDIC SURGERY

## 2024-01-18 PROCEDURE — 4A11X4G MONITORING OF PERIPHERAL NERVOUS ELECTRICAL ACTIVITY, INTRAOPERATIVE, EXTERNAL APPROACH: ICD-10-PCS | Performed by: ORTHOPAEDIC SURGERY

## 2024-01-18 PROCEDURE — 76000 FLUOROSCOPY <1 HR PHYS/QHP: CPT | Performed by: ORTHOPAEDIC SURGERY

## 2024-01-18 PROCEDURE — 0SG0071 FUSION OF LUMBAR VERTEBRAL JOINT WITH AUTOLOGOUS TISSUE SUBSTITUTE, POSTERIOR APPROACH, POSTERIOR COLUMN, OPEN APPROACH: ICD-10-PCS | Performed by: ORTHOPAEDIC SURGERY

## 2024-01-18 DEVICE — IMPLANTABLE DEVICE: Type: IMPLANTABLE DEVICE | Site: BACK | Status: FUNCTIONAL

## 2024-01-18 DEVICE — IMPLANTABLE DEVICE: Type: IMPLANTABLE DEVICE | Status: FUNCTIONAL

## 2024-01-18 RX ORDER — DIAZEPAM 5 MG/ML
2.5 INJECTION, SOLUTION INTRAMUSCULAR; INTRAVENOUS EVERY 10 MIN PRN
Status: COMPLETED | OUTPATIENT
Start: 2024-01-18 | End: 2024-01-18

## 2024-01-18 RX ORDER — HYDROCODONE BITARTRATE AND ACETAMINOPHEN 5; 325 MG/1; MG/1
1 TABLET ORAL ONCE AS NEEDED
Status: DISCONTINUED | OUTPATIENT
Start: 2024-01-18 | End: 2024-01-18 | Stop reason: HOSPADM

## 2024-01-18 RX ORDER — DIPHENHYDRAMINE HYDROCHLORIDE 50 MG/ML
25 INJECTION INTRAMUSCULAR; INTRAVENOUS EVERY 4 HOURS PRN
Status: DISCONTINUED | OUTPATIENT
Start: 2024-01-18 | End: 2024-01-20

## 2024-01-18 RX ORDER — HYDROMORPHONE HYDROCHLORIDE 1 MG/ML
0.6 INJECTION, SOLUTION INTRAMUSCULAR; INTRAVENOUS; SUBCUTANEOUS EVERY 5 MIN PRN
Status: DISCONTINUED | OUTPATIENT
Start: 2024-01-18 | End: 2024-01-18 | Stop reason: HOSPADM

## 2024-01-18 RX ORDER — HYDROCODONE BITARTRATE AND ACETAMINOPHEN 5; 325 MG/1; MG/1
2 TABLET ORAL ONCE AS NEEDED
Status: DISCONTINUED | OUTPATIENT
Start: 2024-01-18 | End: 2024-01-18 | Stop reason: HOSPADM

## 2024-01-18 RX ORDER — ENEMA 19; 7 G/133ML; G/133ML
1 ENEMA RECTAL ONCE AS NEEDED
Status: DISCONTINUED | OUTPATIENT
Start: 2024-01-18 | End: 2024-01-20

## 2024-01-18 RX ORDER — FLUTICASONE FUROATE AND VILANTEROL 100; 25 UG/1; UG/1
1 POWDER RESPIRATORY (INHALATION)
Status: DISCONTINUED | OUTPATIENT
Start: 2024-01-19 | End: 2024-01-20

## 2024-01-18 RX ORDER — DIAZEPAM 5 MG/ML
INJECTION, SOLUTION INTRAMUSCULAR; INTRAVENOUS
Status: COMPLETED
Start: 2024-01-18 | End: 2024-01-18

## 2024-01-18 RX ORDER — ACETAMINOPHEN 500 MG
1000 TABLET ORAL ONCE AS NEEDED
Status: DISCONTINUED | OUTPATIENT
Start: 2024-01-18 | End: 2024-01-18 | Stop reason: HOSPADM

## 2024-01-18 RX ORDER — ONDANSETRON 2 MG/ML
4 INJECTION INTRAMUSCULAR; INTRAVENOUS EVERY 6 HOURS PRN
Status: DISCONTINUED | OUTPATIENT
Start: 2024-01-18 | End: 2024-01-18 | Stop reason: HOSPADM

## 2024-01-18 RX ORDER — METOCLOPRAMIDE HYDROCHLORIDE 5 MG/ML
10 INJECTION INTRAMUSCULAR; INTRAVENOUS EVERY 8 HOURS PRN
Status: DISCONTINUED | OUTPATIENT
Start: 2024-01-18 | End: 2024-01-20

## 2024-01-18 RX ORDER — CEFAZOLIN SODIUM/WATER 2 G/20 ML
2 SYRINGE (ML) INTRAVENOUS ONCE
Status: COMPLETED | OUTPATIENT
Start: 2024-01-18 | End: 2024-01-18

## 2024-01-18 RX ORDER — BISACODYL 10 MG
10 SUPPOSITORY, RECTAL RECTAL
Status: DISCONTINUED | OUTPATIENT
Start: 2024-01-18 | End: 2024-01-20

## 2024-01-18 RX ORDER — HYDROMORPHONE HYDROCHLORIDE 1 MG/ML
0.5 INJECTION, SOLUTION INTRAMUSCULAR; INTRAVENOUS; SUBCUTANEOUS EVERY 2 HOUR PRN
Status: DISCONTINUED | OUTPATIENT
Start: 2024-01-18 | End: 2024-01-20

## 2024-01-18 RX ORDER — METHOCARBAMOL 500 MG/1
500 TABLET, FILM COATED ORAL 3 TIMES DAILY
Status: DISCONTINUED | OUTPATIENT
Start: 2024-01-18 | End: 2024-01-20

## 2024-01-18 RX ORDER — DIPHENHYDRAMINE HCL 25 MG
25 CAPSULE ORAL EVERY 4 HOURS PRN
Status: DISCONTINUED | OUTPATIENT
Start: 2024-01-18 | End: 2024-01-20

## 2024-01-18 RX ORDER — PHENYLEPHRINE HCL 10 MG/ML
VIAL (ML) INJECTION AS NEEDED
Status: DISCONTINUED | OUTPATIENT
Start: 2024-01-18 | End: 2024-01-18 | Stop reason: SURG

## 2024-01-18 RX ORDER — NALOXONE HYDROCHLORIDE 0.4 MG/ML
0.08 INJECTION, SOLUTION INTRAMUSCULAR; INTRAVENOUS; SUBCUTANEOUS AS NEEDED
Status: DISCONTINUED | OUTPATIENT
Start: 2024-01-18 | End: 2024-01-18 | Stop reason: HOSPADM

## 2024-01-18 RX ORDER — DOCUSATE SODIUM 100 MG/1
100 CAPSULE, LIQUID FILLED ORAL 2 TIMES DAILY
Status: DISCONTINUED | OUTPATIENT
Start: 2024-01-18 | End: 2024-01-20

## 2024-01-18 RX ORDER — OXYCODONE HYDROCHLORIDE 15 MG/1
15 TABLET ORAL EVERY 4 HOURS PRN
Status: DISCONTINUED | OUTPATIENT
Start: 2024-01-18 | End: 2024-01-20

## 2024-01-18 RX ORDER — HYDROMORPHONE HYDROCHLORIDE 1 MG/ML
1 INJECTION, SOLUTION INTRAMUSCULAR; INTRAVENOUS; SUBCUTANEOUS EVERY 2 HOUR PRN
Status: DISCONTINUED | OUTPATIENT
Start: 2024-01-18 | End: 2024-01-20

## 2024-01-18 RX ORDER — TRANEXAMIC ACID 10 MG/ML
INJECTION, SOLUTION INTRAVENOUS AS NEEDED
Status: DISCONTINUED | OUTPATIENT
Start: 2024-01-18 | End: 2024-01-18 | Stop reason: SURG

## 2024-01-18 RX ORDER — LEVOTHYROXINE SODIUM 0.07 MG/1
75 TABLET ORAL DAILY
Status: DISCONTINUED | OUTPATIENT
Start: 2024-01-19 | End: 2024-01-20

## 2024-01-18 RX ORDER — SENNOSIDES 8.6 MG
17.2 TABLET ORAL NIGHTLY
Status: DISCONTINUED | OUTPATIENT
Start: 2024-01-18 | End: 2024-01-20

## 2024-01-18 RX ORDER — POLYETHYLENE GLYCOL 3350 17 G/17G
17 POWDER, FOR SOLUTION ORAL DAILY PRN
Status: DISCONTINUED | OUTPATIENT
Start: 2024-01-18 | End: 2024-01-20

## 2024-01-18 RX ORDER — OXYCODONE HYDROCHLORIDE 10 MG/1
10 TABLET ORAL EVERY 4 HOURS PRN
Status: DISCONTINUED | OUTPATIENT
Start: 2024-01-18 | End: 2024-01-20

## 2024-01-18 RX ORDER — METOPROLOL SUCCINATE 25 MG/1
25 TABLET, EXTENDED RELEASE ORAL DAILY
Status: ON HOLD | COMMUNITY
Start: 2024-01-12 | End: 2025-01-11

## 2024-01-18 RX ORDER — HYDROMORPHONE HYDROCHLORIDE 1 MG/ML
INJECTION, SOLUTION INTRAMUSCULAR; INTRAVENOUS; SUBCUTANEOUS
Status: COMPLETED
Start: 2024-01-18 | End: 2024-01-18

## 2024-01-18 RX ORDER — TIZANIDINE 2 MG/1
2 TABLET ORAL EVERY MORNING
COMMUNITY
End: 2024-01-20

## 2024-01-18 RX ORDER — ACETAMINOPHEN 500 MG
1000 TABLET ORAL ONCE
Status: DISCONTINUED | OUTPATIENT
Start: 2024-01-18 | End: 2024-01-18 | Stop reason: HOSPADM

## 2024-01-18 RX ORDER — ONDANSETRON 2 MG/ML
INJECTION INTRAMUSCULAR; INTRAVENOUS
Status: COMPLETED
Start: 2024-01-18 | End: 2024-01-18

## 2024-01-18 RX ORDER — VANCOMYCIN HYDROCHLORIDE 1 G/20ML
INJECTION, POWDER, LYOPHILIZED, FOR SOLUTION INTRAVENOUS AS NEEDED
Status: DISCONTINUED | OUTPATIENT
Start: 2024-01-18 | End: 2024-01-18 | Stop reason: HOSPADM

## 2024-01-18 RX ORDER — ROCURONIUM BROMIDE 10 MG/ML
INJECTION, SOLUTION INTRAVENOUS AS NEEDED
Status: DISCONTINUED | OUTPATIENT
Start: 2024-01-18 | End: 2024-01-18 | Stop reason: SURG

## 2024-01-18 RX ORDER — CEFAZOLIN SODIUM/WATER 2 G/20 ML
2 SYRINGE (ML) INTRAVENOUS EVERY 8 HOURS
Qty: 40 ML | Refills: 0 | Status: COMPLETED | OUTPATIENT
Start: 2024-01-18 | End: 2024-01-19

## 2024-01-18 RX ORDER — HYDROMORPHONE HYDROCHLORIDE 1 MG/ML
0.2 INJECTION, SOLUTION INTRAMUSCULAR; INTRAVENOUS; SUBCUTANEOUS EVERY 5 MIN PRN
Status: DISCONTINUED | OUTPATIENT
Start: 2024-01-18 | End: 2024-01-18 | Stop reason: HOSPADM

## 2024-01-18 RX ORDER — ATORVASTATIN CALCIUM 40 MG/1
40 TABLET, FILM COATED ORAL EVERY MORNING
Status: DISCONTINUED | OUTPATIENT
Start: 2024-01-19 | End: 2024-01-20

## 2024-01-18 RX ORDER — EPHEDRINE SULFATE 50 MG/ML
INJECTION INTRAVENOUS AS NEEDED
Status: DISCONTINUED | OUTPATIENT
Start: 2024-01-18 | End: 2024-01-18 | Stop reason: SURG

## 2024-01-18 RX ORDER — LABETALOL HYDROCHLORIDE 5 MG/ML
5 INJECTION, SOLUTION INTRAVENOUS EVERY 5 MIN PRN
Status: DISCONTINUED | OUTPATIENT
Start: 2024-01-18 | End: 2024-01-18 | Stop reason: HOSPADM

## 2024-01-18 RX ORDER — TIZANIDINE 2 MG/1
4 TABLET ORAL NIGHTLY
Status: ON HOLD | COMMUNITY
End: 2024-01-20

## 2024-01-18 RX ORDER — DICLOFENAC SODIUM 75 MG/1
75 TABLET, DELAYED RELEASE ORAL 2 TIMES DAILY
COMMUNITY
End: 2024-01-20

## 2024-01-18 RX ORDER — METOPROLOL SUCCINATE 25 MG/1
25 TABLET, EXTENDED RELEASE ORAL DAILY
Status: DISCONTINUED | OUTPATIENT
Start: 2024-01-19 | End: 2024-01-20

## 2024-01-18 RX ORDER — LIDOCAINE HYDROCHLORIDE 10 MG/ML
INJECTION, SOLUTION EPIDURAL; INFILTRATION; INTRACAUDAL; PERINEURAL AS NEEDED
Status: DISCONTINUED | OUTPATIENT
Start: 2024-01-18 | End: 2024-01-18 | Stop reason: SURG

## 2024-01-18 RX ORDER — HYDROMORPHONE HYDROCHLORIDE 1 MG/ML
0.4 INJECTION, SOLUTION INTRAMUSCULAR; INTRAVENOUS; SUBCUTANEOUS EVERY 5 MIN PRN
Status: DISCONTINUED | OUTPATIENT
Start: 2024-01-18 | End: 2024-01-18 | Stop reason: HOSPADM

## 2024-01-18 RX ORDER — SODIUM CHLORIDE, SODIUM LACTATE, POTASSIUM CHLORIDE, CALCIUM CHLORIDE 600; 310; 30; 20 MG/100ML; MG/100ML; MG/100ML; MG/100ML
INJECTION, SOLUTION INTRAVENOUS CONTINUOUS
Status: DISCONTINUED | OUTPATIENT
Start: 2024-01-18 | End: 2024-01-20

## 2024-01-18 RX ORDER — SODIUM CHLORIDE, SODIUM LACTATE, POTASSIUM CHLORIDE, CALCIUM CHLORIDE 600; 310; 30; 20 MG/100ML; MG/100ML; MG/100ML; MG/100ML
INJECTION, SOLUTION INTRAVENOUS CONTINUOUS
Status: DISCONTINUED | OUTPATIENT
Start: 2024-01-18 | End: 2024-01-18 | Stop reason: HOSPADM

## 2024-01-18 RX ORDER — ONDANSETRON 2 MG/ML
4 INJECTION INTRAMUSCULAR; INTRAVENOUS EVERY 6 HOURS PRN
Status: DISCONTINUED | OUTPATIENT
Start: 2024-01-18 | End: 2024-01-20

## 2024-01-18 RX ORDER — DULOXETIN HYDROCHLORIDE 20 MG/1
20 CAPSULE, DELAYED RELEASE ORAL DAILY
Status: DISCONTINUED | OUTPATIENT
Start: 2024-01-18 | End: 2024-01-20

## 2024-01-18 RX ORDER — ALBUTEROL SULFATE 2.5 MG/3ML
2.5 SOLUTION RESPIRATORY (INHALATION) AS NEEDED
Status: DISCONTINUED | OUTPATIENT
Start: 2024-01-18 | End: 2024-01-18 | Stop reason: HOSPADM

## 2024-01-18 RX ADMIN — PHENYLEPHRINE HCL 100 MCG: 10 MG/ML VIAL (ML) INJECTION at 12:40:00

## 2024-01-18 RX ADMIN — PHENYLEPHRINE HCL 50 MCG: 10 MG/ML VIAL (ML) INJECTION at 14:02:00

## 2024-01-18 RX ADMIN — TRANEXAMIC ACID 1000 MG: 10 INJECTION, SOLUTION INTRAVENOUS at 11:55:00

## 2024-01-18 RX ADMIN — EPHEDRINE SULFATE 5 MG: 50 INJECTION INTRAVENOUS at 12:24:00

## 2024-01-18 RX ADMIN — PHENYLEPHRINE HCL 50 MCG: 10 MG/ML VIAL (ML) INJECTION at 13:48:00

## 2024-01-18 RX ADMIN — EPHEDRINE SULFATE 2.5 MG: 50 INJECTION INTRAVENOUS at 14:03:00

## 2024-01-18 RX ADMIN — PHENYLEPHRINE HCL 50 MCG: 10 MG/ML VIAL (ML) INJECTION at 13:19:00

## 2024-01-18 RX ADMIN — PHENYLEPHRINE HCL 100 MCG: 10 MG/ML VIAL (ML) INJECTION at 12:57:00

## 2024-01-18 RX ADMIN — CEFAZOLIN SODIUM/WATER 2 G: 2 G/20 ML SYRINGE (ML) INTRAVENOUS at 11:55:00

## 2024-01-18 RX ADMIN — LIDOCAINE HYDROCHLORIDE 100 MG: 10 INJECTION, SOLUTION EPIDURAL; INFILTRATION; INTRACAUDAL; PERINEURAL at 11:22:00

## 2024-01-18 RX ADMIN — ROCURONIUM BROMIDE 10 MG: 10 INJECTION, SOLUTION INTRAVENOUS at 11:22:00

## 2024-01-18 RX ADMIN — PHENYLEPHRINE HCL 50 MCG: 10 MG/ML VIAL (ML) INJECTION at 12:22:00

## 2024-01-18 RX ADMIN — SODIUM CHLORIDE, SODIUM LACTATE, POTASSIUM CHLORIDE, CALCIUM CHLORIDE: 600; 310; 30; 20 INJECTION, SOLUTION INTRAVENOUS at 13:50:00

## 2024-01-18 NOTE — ANESTHESIA PROCEDURE NOTES
Airway  Date/Time: 1/18/2024 11:24 AM  Urgency: elective      General Information and Staff    Patient location during procedure: OR  Anesthesiologist: Kaya Myers MD  Performed: anesthesiologist   Performed by: Kaya Myers MD  Authorized by: Kaya Myers MD      Indications and Patient Condition  Indications for airway management: anesthesia  Sedation level: deep  Preoxygenated: yes  Patient position: sniffing  Mask difficulty assessment: 1 - vent by mask    Final Airway Details  Final airway type: endotracheal airway      Successful airway: ETT  Cuffed: yes   Successful intubation technique: direct laryngoscopy  Endotracheal tube insertion site: oral  Blade: Tonya  Blade size: #3  ETT size (mm): 7.0    Cormack-Lehane Classification: grade IIB - view of arytenoids or posterior of glottis only  Placement verified by: capnometry   Measured from: lips  ETT to lips (cm): 22  Number of attempts at approach: 1

## 2024-01-18 NOTE — CONSULTS
General Medicine Consult      Reason for consult: Medical management    Consulted by:     PCP: Gordo Antonio MD      History of Present Illness: Patient is a 73 year old female with PMH sig for asthma, hypertension, hyperlipidemia, hypothyroidism, depression, spinal stenosis who presents for elective spinal surgery.  She is seen postoperatively.  Tolerated surgery.  Pain is not controlled.  Denies any chest pain shortness of breath nausea vomiting fever chills at this time.  Complaints at all her pain is in her back.      PMH:  Past Medical History:   Diagnosis Date    ASTHMA     Asthma     Back problem     Calculus of kidney     Complex partial seizure disorder (HCC)     DEPRESSION     Disorder of thyroid     High blood pressure     High cholesterol     History of blood transfusion 2005    HYPERLIPIDEMIA     HYPERTENSION     Macular degeneration     Muscle weakness     NAVEEN (obstructive sleep apnea) [facility] 7-6-10     AHI 17  RDI 20  REM AHI 34  YULY 76%    OSTEOARTHRITIS     Osteoarthritis     Seizure disorder (HCC)     last seizure was 06/2018    Sleep apnea     no device        PSH:  Past Surgical History:   Procedure Laterality Date    BENIGN BIOPSY LEFT      15-20 years ago    CATARACT      COLONOSCOPY      COLONOSCOPY,BIOPSY  12/04/2007    Performed by MARY JO RUSH at [facility], [facility]    HYSTERECTOMY      KNEE REPLACEMENT SURGERY      Right 05/2010    KNEE REPLACEMENT SURGERY Left 2017    Total    LUMBAR SPINE FUSION,ANTER APPRCH  2005    OTHER SURGICAL HISTORY  1993    Lumbar 3-4 ingrid laminectomy. microdicectomy        Home Medications:  Outpatient Medications Marked as Taking for the 1/18/24 encounter (Hospital Encounter)   Medication Sig Dispense Refill    metoprolol succinate ER 25 MG Oral Tablet 24 Hr Take 1 tablet (25 mg total) by mouth daily.      diclofenac 75 MG Oral Tab EC Take 1 tablet (75 mg total) by mouth 2 (two) times daily.      tiZANidine 2 MG Oral Tab Take 2 tablets  (4 mg total) by mouth at bedtime.      tiZANidine 2 MG Oral Tab Take 1 tablet (2 mg total) by mouth every morning.      diphenhydrAMINE (BENADRYL ALLERGY) 25 MG Oral Cap Take 1 capsule (25 mg total) by mouth every 6 (six) hours as needed for Itching.      aspirin 81 MG Oral Tab EC Take 1 tablet (81 mg total) by mouth daily.      acetaminophen-codeine 300-30 MG Oral Tab Take 1 tablet by mouth every 4 (four) hours as needed for Pain.      amLODIPine 10 MG Oral Tab Take 1 tablet (10 mg total) by mouth daily.      atorvastatin 40 MG Oral Tab Take 1 tablet (40 mg total) by mouth every morning.      LOSARTAN 100 MG Oral Tab TAKE 1 TABLET BY MOUTH  DAILY 90 tablet 0    DULoxetine HCl 20 MG Oral Cap DR Particles Take 1 capsule (20 mg total) by mouth daily. 90 capsule 3    Levothyroxine Sodium 75 MCG Oral Tab Take 1 tablet (75 mcg total) by mouth daily. 90 tablet 3    fluticasone-salmeterol 250-50 MCG/DOSE Inhalation Aerosol Powder, Breath Activated Inhale 1 puff into the lungs 2 (two) times daily. 60 each 3    Multiple Vitamins-Minerals (OCUTABS-LUTEIN) Oral Tab Take 1 tablet by mouth daily.         Scheduled Medication:   sennosides  17.2 mg Oral Nightly    docusate sodium  100 mg Oral BID    methocarbamol  500 mg Oral TID    ceFAZolin  2 g Intravenous Q8H     Continuous Infusing Medication:   lactated ringers Stopped (01/18/24 1614)    lactated ringers 125 mL/hr at 01/18/24 1615     PRN Medication:sodium chloride, polyethylene glycol (PEG 3350), magnesium hydroxide, bisacodyl, fleet enema, ondansetron, metoclopramide, diphenhydrAMINE **OR** diphenhydrAMINE, benzocaine-menthol, oxyCODONE **OR** oxyCODONE, HYDROmorphone **OR** HYDROmorphone     ALL:  Allergies   Allergen Reactions    No Known Allergies         Soc Hx:  Social History     Tobacco Use    Smoking status: Former     Packs/day: 1.00     Years: 15.00     Additional pack years: 0.00     Total pack years: 15.00     Types: Cigarettes     Quit date: 1/1/1990      Years since quittin.0    Smokeless tobacco: Never   Substance Use Topics    Alcohol use: No        Fam Hx  Family History   Problem Relation Age of Onset    Hypertension Mother     Diabetes Mother     Breast Cancer Other 60        mat aunt    Breast Cancer Maternal Grandmother 40        Age at dx 40s       Review of Systems  Comprehensive ROS reviewed and negative except for what's stated above.  Including negative for chest pain, shortness of breath, syncope.       OBJECTIVE:  /74 (BP Location: Left arm)   Pulse 82   Temp 97.6 °F (36.4 °C) (Temporal)   Resp 16   Ht 5' 3\" (1.6 m)   Wt 212 lb (96.2 kg)   SpO2 94%   BMI 37.55 kg/m²   General:  Alert, no distress, appears stated age.  Laying still due to pain,   Head:  Normocephalic, without obvious abnormality, atraumatic.   Eyes:  Sclera anicteric, No conjunctival pallor, EOMs intact.    Nose: Nares normal. Septum midline. Mucosa normal. No drainage.   Throat: Lips, mucosa, and tongue normal. Teeth and gums normal.   Neck: Supple, symmetrical, trachea midline    Lungs:   Clear to auscultation bilaterally. Normal effort   Chest wall:  No tenderness or deformity.   Heart:  Regular rate and rhythm, S1, S2 normal, no murmur, rub or gallop appreciated   Abdomen:   Soft, non-tender. Bowel sounds normal. No masses,  No organomegaly. Non distended   Extremities: Extremities normal, atraumatic, no cyanosis or edema.   Skin: Skin color, texture, turgor normal. No rashes or lesions.    Neurologic: Normal strength, no focal deficit appreciated       Diagnostics:   CBC/Chem  No results for input(s): \"WBC\", \"HGB\", \"MCV\", \"PLT\", \"BAND\", \"INR\" in the last 168 hours.    Invalid input(s): \"LYM#\", \"MONO#\", \"BASOS#\", \"EOSIN#\"    No results for input(s): \"NA\", \"K\", \"CL\", \"CO2\", \"BUN\", \"CREATSERUM\", \"GLU\", \"CA\", \"CAION\", \"MG\", \"PHOS\" in the last 168 hours.    No results for input(s): \"ALT\", \"AST\", \"ALB\", \"AMYLASE\", \"LIPASE\", \"LDH\" in the last 168 hours.    Invalid  input(s): \"ALPHOS\", \"TBIL\", \"DBIL\", \"TPROT\"    No results for input(s): \"TROP\" in the last 168 hours.    Radiology: XR FLUOROSCOPY C-ARM TIME LESS THAN 1 HOUR (CPT=76000)    Result Date: 1/18/2024  CONCLUSION:  The images obtained and the fluoroscopy which was provided was for planning purposes at the time of the procedure.    LOCATION:  [facility]    Dictated by (CST): Pillo Aguiar MD on 1/18/2024 at 2:51 PM     Finalized by (CST): Pillo Aguiar MD on 1/18/2024 at 2:51 PM          ASSESSMENT / PLAN:    Patient is a 73 year old female with PMH sig for asthma, hypertension, hyperlipidemia, hypothyroidism, depression, spinal stenosis who presents for elective spinal surgery.     Spinal stenosis  Status post left L2-3 lateral interbody fusion, PSF L2-3 SHYANNE L3-L5 and re-instrumentation, POD 0  - IV/p.o. pain medications  - PT/OT  - Monitor for acute blood loss anemia  - Postop care per spine surgery    Hyperlipidemia  - Resume statin    Hypertension  - Resume metoprolol, hold losartan and amlodipine-resume as needed    Hypothyroidism  - Resume levothyroxine    Asthma, not in exacerbation  - Resume home inhalers    Prophylaxis:   DVT with SCDs  Atrophy Prophylaxis PT/OT  Lines/Monitors: PIV    Dispo: Admit  Code status: Full    Patient and/or patient's family given opportunity to ask questions and note understanding and agreeing with therapeutic plan as outlined    Thank you for allowing me to participate in the care of this patient.     Thank You,  Silvia Scott DO    [facility] Hospitalist  Pager   Answering Service number: [phone]

## 2024-01-18 NOTE — ANESTHESIA PROCEDURE NOTES
Arterial Line    Performed by: Kaya Myers MD  Authorized by: Kaya Myers MD    General Information and Staff    Procedure Start:   Anesthesiologist:  Kaya Myers MD  Performed By:  Anesthesiologist  Patient Location:  OR  Indication: continuous blood pressure monitoring and blood sampling needed    Site Identification: real time ultrasound guided    Preanesthetic Checklist: 2 patient identifiers, IV checked, risks and benefits discussed, monitors and equipment checked, pre-op evaluation, timeout performed, anesthesia consent and sterile technique used    Procedure Details    Catheter Size:  20 G  Catheter Length:  1 and 3/4 inch  Catheter Type:  Arrow  Seldinger Technique?: Yes    Laterality:  Left  Site:  Radial artery  Site Prep: chlorhexidine    Line Secured:  Wrist Brace, tape and Tegaderm    Assessment    Events: patient tolerated procedure well with no complications      Medications      Additional Comments

## 2024-01-18 NOTE — ANESTHESIA PREPROCEDURE EVALUATION
PRE-OP EVALUATION    Patient Name: Gloria Mccrary    Admit Diagnosis: spondylolistenosis of lumbar, weakness of left hip,lumbar srenosis    Pre-op Diagnosis: spondylolistenosis of lumbar, weakness of left hip,lumbar srenosis    lumbar 2-lumbar 3 prone lateral interbody fusion,lumbar 2-lumbar 3 posterior spinal fusion, removal of hardware lumbar 3-lumbar 4, lumbar 4-lumbar 5 and re instrumentation    Anesthesia Procedure: lumbar 2-lumbar 3 prone lateral interbody fusion,lumbar 2-lumbar 3 posterior spinal fusion, removal of hardware lumbar 3-lumbar 4, lumbar 4-lumbar 5 and re instrumentation (Spine Lumbar)  INTRAOPERATIVE NEURO MONITORING  . (Spine Lumbar)  . (Spine Lumbar)    Surgeon(s) and Role:     * Sharon Gomez MD - Primary    Pre-op vitals reviewed.        Body mass index is 33.66 kg/m².    Current medications reviewed.  Hospital Medications:   clonidine-EPINEPHrine-ropivacaine-ketorolac pain cocktail syringe (OR)   Injection Once (Intra-Op)       Outpatient Medications:     Medications Prior to Admission   Medication Sig Dispense Refill Last Dose    diphenhydrAMINE (BENADRYL ALLERGY) 25 MG Oral Cap Take 1 capsule (25 mg total) by mouth every 6 (six) hours as needed for Itching.       aspirin 81 MG Oral Tab EC Take 1 tablet (81 mg total) by mouth daily.       DICLOFENAC OR Take 75 mg by mouth in the morning and 75 mg before bedtime.       acetaminophen-codeine 300-30 MG Oral Tab Take 1 tablet by mouth every 4 (four) hours as needed for Pain.       amLODIPine 10 MG Oral Tab Take 1 tablet (10 mg total) by mouth daily.       atorvastatin 40 MG Oral Tab Take 1 tablet (40 mg total) by mouth nightly.       tiZANidine 2 MG Oral Tab TAKE ONE TABLET BY MOUTH EVERY MORNING AND TWO TABLETS EVERY EVENING 90 tablet 0     LOSARTAN 100 MG Oral Tab TAKE 1 TABLET BY MOUTH  DAILY 90 tablet 0     DULoxetine HCl 20 MG Oral Cap DR Particles Take 1 capsule (20 mg total) by mouth daily. 90 capsule 3     Levothyroxine Sodium 75  MCG Oral Tab Take 1 tablet (75 mcg total) by mouth daily. 90 tablet 3     fluticasone-salmeterol 250-50 MCG/DOSE Inhalation Aerosol Powder, Breath Activated Inhale 1 puff into the lungs 2 (two) times daily. 60 each 3     Multiple Vitamins-Minerals (OCUTABS-LUTEIN) Oral Tab Take by mouth.          Allergies: No known allergies      Anesthesia Evaluation        Anesthetic Complications           GI/Hepatic/Renal                                 Cardiovascular  Comment: Stress test 02/2022    Summary:     1. Stress ECG conclusions: The stress ECG is normal. The sensitivity of this      test is limited by a failure to achieve target heart rate.    (Mets of 5.8 and max HR of 126)    TTE 02/2022  Summary:     1. Left ventricle: The cavity size is normal. Wall thickness is normal.      Systolic function is normal. The estimated ejection fraction is 55-60%.      Wall motion is normal; there are no regional wall motion abnormalities.      There is diastolic dysfunction.   2. Mitral valve: Mildly calcified annulus.   3. Right ventricle: Estimation of the right ventricular systolic pressure is      within the normal range. RV systolic pressure (S, est): 31mm Hg.   4. Pericardium, extracardiac: There is no significant pericardial effusion.         Exercise tolerance: good     MET: >4    (+) obesity  (+) hypertension   (+) hyperlipidemia                                  Endo/Other                                  Pulmonary  Comment: Former smoker.     (+) asthma              (+) sleep apnea       Neuro/Psych      (+) depression                                Past Surgical History:   Procedure Laterality Date    BENIGN BIOPSY LEFT      15-20 years ago    CATARACT      COLONOSCOPY      COLONOSCOPY,BIOPSY  12/04/2007    Performed by MARY JO RUSH at [facility] [place], [facility]    HYSTERECTOMY      KNEE REPLACEMENT SURGERY      Right 05/2010    KNEE REPLACEMENT SURGERY Left 2017    Total    LUMBAR SPINE FUSION,ANTER APPRCH  2005    OTHER  SURGICAL HISTORY  1993    Lumbar 3-4 ingrid laminectomy. microdicectomy     Social History     Socioeconomic History    Marital status:     Number of children: 0   Occupational History    Occupation: pharmacy tech     Employer: [employer]   Tobacco Use    Smoking status: Former     Packs/day: 1.00     Years: 15.00     Additional pack years: 0.00     Total pack years: 15.00     Types: Cigarettes     Quit date: 1990     Years since quittin.0    Smokeless tobacco: Never   Vaping Use    Vaping Use: Never used   Substance and Sexual Activity    Alcohol use: No    Drug use: No     History   Drug Use No     Available pre-op labs reviewed.  Lab Results   Component Value Date    WBC 9.1 2024    RBC 4.29 2024    HGB 13.4 2024    HCT 41.9 2024    MCV 97.7 2024    MCH 31.2 2024    MCHC 32.0 2024    RDW 13.2 2024    .0 2024     Lab Results   Component Value Date     2024    K 4.3 2024     (H) 2024    CO2 21.0 2024    BUN 29 (H) 2024    CREATSERUM 0.98 2024     (H) 2024    CA 9.6 2024     Lab Results   Component Value Date    INR 1.09 2024         Airway      Mallampati: III  Mouth opening: 3 FB  TM distance: 4 - 6 cm  Neck ROM: full Cardiovascular      Rhythm: regular  Rate: normal     Dental    Dentition appears grossly intact         Pulmonary    Pulmonary exam normal.                 Other findings              ASA: 3   Plan: general  NPO status verified and patient meets guidelines.    Post-procedure pain management plan discussed with surgeon and patient.    Comment: Discussed A-line and additional peripheral IV placement.   Plan/risks discussed with: patient  Use of blood product(s) discussed with: patient    Consented to blood products.          Present on Admission:  **None**

## 2024-01-18 NOTE — ANESTHESIA POSTPROCEDURE EVALUATION
[facility]    Gloria Mccrary Patient Status:  Inpatient   Age/Gender 73 year old female MRN [id number]   Location [facility] POST ANESTHESIA CARE UNIT Attending Sharon Gomez MD   Hosp Day # 0 PCP Gordo Antonio MD       Anesthesia Post-op Note    lumbar 2-lumbar 3 prone lateral interbody fusion,lumbar 2-lumbar 3 posterior spinal fusion, removal of hardware lumbar 3-lumbar 4, lumbar 4-lumbar 5 and re instrumentation    Procedure Summary       Date: 01/18/24 Room / Location:  MAIN OR 11 / [facility] MAIN OR    Anesthesia Start: 1114 Anesthesia Stop: 1440    Procedures:       lumbar 2-lumbar 3 prone lateral interbody fusion,lumbar 2-lumbar 3 posterior spinal fusion, removal of hardware lumbar 3-lumbar 4, lumbar 4-lumbar 5 and re instrumentation (Spine Lumbar)      INTRAOPERATIVE NEURO MONITORING (Spine Lumbar)      . (Spine Lumbar)      . (Spine Lumbar) Diagnosis: (spondylolistenosis of lumbar, weakness of left hip,lumbar srenosis)    Surgeons: Sharon Gomez MD Anesthesiologist: Kaya Myers MD    Anesthesia Type: general ASA Status: 3            Anesthesia Type: general    Vitals Value Taken Time   /77 01/18/24 1442   Temp 98.5 01/18/24 1442   Pulse 76 01/18/24 1442   Resp 18 01/18/24 1442   SpO2 95 01/18/24 1442       Patient Location: PACU    Anesthesia Type: general    Airway Patency: patent and extubated    Postop Pain Control: adequate    Nausea/Vomiting: none    Cardiopulmonary/Hydration status: stable euvolemic    Complications: no apparent anesthesia related complications    Postop vital signs: stable    Dental Exam: Unchanged from Preop    Patient to be discharged from PACU when criteria met.

## 2024-01-18 NOTE — INTERVAL H&P NOTE
Pre-op Diagnosis: spondylolistenosis of lumbar, weakness of left hip,lumbar srenosis    The above referenced H&P was reviewed by KYREE Britton on 1/18/2024, the patient was examined and no significant changes have occurred in the patient's condition since the H&P was performed.  I discussed with the patient and/or legal representative the potential benefits, risks and side effects of this procedure; the likelihood of the patient achieving goals; and potential problems that might occur during recuperation.  I discussed reasonable alternatives to the procedure, including risks, benefits and side effects related to the alternatives and risks related to not receiving this procedure.  We will proceed with procedure as planned.

## 2024-01-19 LAB
HCT VFR BLD AUTO: 37.2 %
HGB BLD-MCNC: 11.8 G/DL

## 2024-01-19 PROCEDURE — 97161 PT EVAL LOW COMPLEX 20 MIN: CPT

## 2024-01-19 PROCEDURE — 85018 HEMOGLOBIN: CPT | Performed by: PHYSICIAN ASSISTANT

## 2024-01-19 PROCEDURE — 85014 HEMATOCRIT: CPT | Performed by: PHYSICIAN ASSISTANT

## 2024-01-19 PROCEDURE — 97535 SELF CARE MNGMENT TRAINING: CPT

## 2024-01-19 PROCEDURE — 97165 OT EVAL LOW COMPLEX 30 MIN: CPT

## 2024-01-19 PROCEDURE — 97116 GAIT TRAINING THERAPY: CPT

## 2024-01-19 RX ORDER — ACETAMINOPHEN 325 MG/1
650 TABLET ORAL EVERY 6 HOURS PRN
Status: DISCONTINUED | OUTPATIENT
Start: 2024-01-19 | End: 2024-01-20

## 2024-01-19 NOTE — PLAN OF CARE
Back dressings clean, dry, intact. Color, movement, sensation to all extremities intact.  Has been up in chair, participated with physical and occupational therapy.  States pain controlled adequately on oral pain medication, voided without difficulty and tolerating activity.  Instructed on plan of care, call for all asst needed, discomfort felt, call don't fall protocol.   Safety precautions maintained.  Plan discharge home with home health tomorrow.  Verbalized understanding and in agreement w/discharge plan.
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
## 2024-01-19 NOTE — PROGRESS NOTES
Patient admitted via bed from PACU. Oriented to room. Safety precautions initiated. Call light in reach. Spouse at bedside.

## 2024-01-19 NOTE — PHYSICAL THERAPY NOTE
PHYSICAL THERAPY EVALUATION - INPATIENT     Room Number: 369/369-A  Evaluation Date: 1/19/2024  Type of Evaluation: Initial  Physician Order: PT Eval and Treat    Presenting Problem: L2-L3 fusion, removal of hardware L3-L5 and reinstrumentation  Co-Morbidities : seizure disorder, asthma, HTN, HLD, depressive disorder  Reason for Therapy: Mobility Dysfunction and Discharge Planning    History related to current admission: Patient is a 73 year old female admitted on 1/18/2024 from home for planned L2-L3 fusion and removal of hardware and re instrumentation    PROCEDURE 1/18/24: Left L2-3 lateral interbody fusion and PSF L2-3 with SHYANNE L3-L5 and re-instrumentation    ASSESSMENT   In this PT evaluation, the patient presents with the following impairments incr pain/discomfort, fear of movement, decr awareness of log roll for bed mobility, decr static and dynamic standing balance, decr activity tolerance, decr functional mobility.  These impairments and comorbidities manifest themselves as functional limitations in independent bed mobility, transfers, and gait.  The patient is below baseline and would benefit from skilled inpatient PT to address the above deficits to assist patient in returning to prior to level of function.   Functional outcome measures completed include [facility].  The -PAC '6-Clicks' Inpatient Basic Mobility Short Form was completed and this patient is demonstrating a Approx Degree of Impairment: 50.57%  degree of impairment in mobility. Research supports that patients with this level of impairment may benefit from HHPT.  DISCHARGE RECOMMENDATIONS  PT Discharge Recommendations: Home with home health PT/OT    PLAN  PT Treatment Plan: Bed mobility;Body mechanics;Coordination;Endurance;Energy conservation;Family education;Patient education;Gait training;Neuromuscular re-educate;Range of motion;Strengthening;Stoop training;Stair training;Transfer training;Balance training  Rehab Potential :  Good  Frequency (Obs): Daily  Number of Visits to Meet Established Goals: 3      CURRENT GOALS    Goal #1 Patient is able to demonstrate supine - sit EOB @ level: supervision     Goal #2 Patient is able to demonstrate transfers Sit to/from Stand at assistance level: supervision     Goal #3 Patient is able to ambulate 50 feet with assist device: walker - rolling at assistance level: supervision     Goal #4 Pt able to ambulate 100 feet w/ RW at supervision.   Goal #5 Pt able to ascend/descend small stoop step for safe home navigation at supervision.   Goal #6 Pt able to maintain spine precautions 100% of the time.    Goal Comments: Goals established on 1/19/2024    HOME SITUATION  Type of Home: [facility]   Home Layout: One level (w/ basement)  Stairs to Enter : 1  Railing: No          Lives With: Spouse  Drives: Yes  Patient Owned Equipment: Rolling walker;Cane  Patient Regularly Uses: Reading glasses    Prior Level of [place]: Per pt typically mod independent with all aspects of mobility. Ambulates with cane usually, but sometimes RW. However for last six months or so has been pretty sedentary. Lives with spouse in one level [facility] with basement, but doesn't go down the stairs anymore. Lays in bed or on sofa couch most of the day. 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  Two recent falls, one a few days ago in the snow. Either pushes cart or uses electric scooter at stores.     SUBJECTIVE  \"I think it's just me, but I am hyperventilating.\"       OBJECTIVE  Precautions: Spine;Seizure;Bed/chair alarm  Fall Risk: High fall risk    WEIGHT BEARING RESTRICTION  Weight Bearing Restriction: None                PAIN ASSESSMENT  Rating: Unable to rate  Location: lumbar spine  Management Techniques: Activity promotion;Body mechanics;Repositioning    COGNITION  Overall Cognitive Status:  WFL - within functional limits    RANGE OF MOTION AND STRENGTH ASSESSMENT  Upper extremity ROM and  strength are within functional limits   Lower extremity ROM is within functional limits   Lower extremity strength is within functional limits       BALANCE  Static Sitting: Fair +  Dynamic Sitting: Fair  Static Standing: Fair -  Dynamic Standing: Poor +    ADDITIONAL TESTS                                    ACTIVITY TOLERANCE                         O2 WALK       NEUROLOGICAL FINDINGS                        AM-PAC '6-Clicks' INPATIENT SHORT FORM - BASIC MOBILITY  How much difficulty does the patient currently have...  Patient Difficulty: Turning over in bed (including adjusting bedclothes, sheets and blankets)?: A Little   Patient Difficulty: Sitting down on and standing up from a chair with arms (e.g., wheelchair, bedside commode, etc.): A Little   Patient Difficulty: Moving from lying on back to sitting on the side of the bed?: A Little   How much help from another person does the patient currently need...   Help from Another: Moving to and from a bed to a chair (including a wheelchair)?: A Little   Help from Another: Need to walk in hospital room?: A Little   Help from Another: Climbing 3-5 steps with a railing?: A Lot       AM-PAC Score:  Raw Score: 17   Approx Degree of Impairment: 50.57%   Standardized Score (AM-PAC Scale): 42.13   CMS Modifier (G-Code): CK    FUNCTIONAL ABILITY STATUS  Gait Assessment   Functional Mobility/Gait Assessment  Gait Assistance: Contact guard assist;Supervision  Distance (ft): 50  Assistive Device: Rolling walker  Pattern: Shuffle (decr francois, decr step length)    Skilled Therapy Provided     Bed Mobility:  Rolling: educated on log roll technique for bed mobility- Manpreet to complete with incr time, assistance required at trunk to roll   Sidelying to sit: Manpreet to reach static sitting EOB- assist at trunk and BLE to transfer off bed    Sit to supine: NT     Transfer Mobility:  Sit to stand: CGA to RW- v/c for hand placement, incr time to reach erect posture    Stand to sit:  CGA  Gait = CGA to supervision w/ RW x 50 feet, slow francois, decr step length, shuffle     Therapist's Comments:  Patient presents laying in bed. Discussed role and goal of physical therapy in hospital setting. Pt in agreement to session. Pt very emotional and anxious throughout session. Positive encouragement provided and pt doing better at end of session. Pt did note she has sensation in bilateral hips now, which was not present prior to surgery.   Educated on spine precautions: no bending, lifting, twisting and use of log roll technique for bed mobility. Pt verbalizes understanding.   Bed mobility at Manpreet for both log roll and sidelying to sitting. Once in sitting, pt felt as if she was hyperventilating- sats >90% and educated on pursed lip breathing.   Sit to stand at CGA to RW- v/c for hand placement. Ambulated CGA w/ RW to bathroom- see OT note for toileting specifics.  Ambulated 50 feet w/ RW, see above for specifics. Pt with incr pain and unable to ambulate any further at this time. Pt upright in chair at end of session.  Educated on importance of continued out of bed mobility and encouraged continued ambulation with nursing staff. Pt verbalizes understanding.  Rec HHPT/OT at time of dc. RN and SW aware.    Exercise/Education Provided:  Bed mobility  Body mechanics  Energy conservation  Functional activity tolerated  Gait training  Posture  Transfer training    Patient End of Session: Up in chair;Needs met;Call light within reach;RN aware of session/findings;All patient questions and concerns addressed;SCDs in place;Ice applied;Alarm set;Discussed recommendations with /      Patient Evaluation Complexity Level:  History Moderate - 1 or 2 personal factors and/or co-morbidities   Examination of body systems Low - addressing 1-2 elements   Clinical Presentation Low - Stable   Clinical Decision Making Low - Stable     PT Session Time: 29 minutes  Gait Training: 15 minutes  Therapeutic  Activity: 5 minutes

## 2024-01-19 NOTE — PROGRESS NOTES
S: left groin pain. Doing well. Has been immobilized prior to surgery.     Inspection:  Awake alert No acute distress. No difficulty breathing     Blood pressure 147/69, pulse 90, temperature 98.3 °F (36.8 °C), temperature source Oral, resp. rate 16, height 5' 3\" (1.6 m), weight 212 lb (96.2 kg), SpO2 93%, not currently breastfeeding.    Recent Labs   Lab 01/19/24  0550   HGB 11.8*   HCT 37.2       Lumbar/Sacral Integument: Incision/ incisions;  Dressing in place, good wound approximation no erythema or fluctuance    Strength: Strength of bilateral lower extremities:     Left Right    EHL 4+/5 5/5    DF 4+/5 5/5    PF 4+/5 5/5    Quads 4+/5 5/5    IP 4+/5 5/5  Sensation: No sensory deficits noted on bilateral lower extremities      HEAD/NECK: Head is normocephalic  EYES: EOMI, JANETTE  SKIN EXAM: Skin is intact, head, neck, trunk and arms/legs. No rashes, mottling or ulcerations.  LYMPH EXAM: There is no lymph edema in either lower extremity.  VASCULAR EXAM:  Good distal perfusion. No clubbing or cyanosis.Calves supple, NT bilaterally  ABDOMINAL EXAM: Abdomen is soft, NT.      A/P: POD # 1 s/p LLeft L2-3 lateral interbody fusion and PSF L2-3 with SHYANNE L3-L5 and re-instrumentation     P: Continue pain management, Continue PT/OT, Discharge planning: ok to d/c if cleared by other services , Continue medical management

## 2024-01-19 NOTE — PLAN OF CARE
Aox4, denying numbness or tingling to BLE, surgical dressing c/d/I, pain controlled on ordered medication, on 2L O2 , encouraging IS, scds + teds, straight cath x1, PT/OT to see, attempted to use bsc but patient unable to move legs to support transfer, no further needs at this time.

## 2024-01-19 NOTE — CM/SW NOTE
01/19/24 1200   CM/SW Referral Data   Referral Source Social Work (self-referral)   Reason for Referral Discharge planning   Informant EMR;Clinical Staff Member   Discharge Needs   Anticipated D/C needs Home health care       Patient is a 74 y/o woman admitted s/p lumbar fusion.  Informed by therapy of their recommendation for [place] services.  Referral sent to  providers via [facility].  Await responses for further DC planning.  / to remain available for support and/or discharge planning.     Laura Stout, [facility]  Discharge Planner  [phone]

## 2024-01-19 NOTE — CM/SW NOTE
01/19/24 1433   Choice of Post-Acute Provider   Informed patient of right to choose their preferred provider Yes   List of appropriate post-acute services provided to patient/family with quality data Yes   Patient/family choice [facility]   Information given to Patient;Spouse/Significant other       Met with pt/spouse at bedside and provided information about only accepting HH agency, [facility].  Pt agreeable with HHC through [facility] at discharge.  No other DC needs/concerns identified at this time.  [facility] reserved in [facility].  Informed them of anticipated DC tomorrow.  Updated pt's RN.  / to remain available for support and/or discharge planning.     Laura Stout, [facility]  Discharge Planner  [phone]

## 2024-01-19 NOTE — PROGRESS NOTES
DM Hospitalist Progress Note     CC: Hospital Follow up    PCP: Gordo Antonio MD       Assessment/Plan:     Active Problems:    * No active hospital problems. *          Patient is a 73 year old female with PMH sig for asthma, hypertension, hyperlipidemia, hypothyroidism, depression, spinal stenosis who presents for elective spinal surgery.     Spinal stenosis  Status post left L2-3 lateral interbody fusion, PSF L2-3 SHYANNE L3-L5 and re-instrumentation, POD 1  Postop pain  - switched over to p.o. medications  - PT/OT  - Monitor for acute blood loss anemia  - Postop care per spine surgery    Hyperlipidemia  - Resume statin    Hypertension  - Resume metoprolol, hold losartan and amlodipine-resume as needed-BP trend reviewed    Hypothyroidism  - Resume levothyroxine    Asthma, not in exacerbation  - Resume home inhalers    Prophylaxis:   DVT with SCDs  Atrophy Prophylaxis PT/OT  Lines/Monitors: PIV    Dispo: Anticipate discharge tomorrow when pain is better controlled  Code status: Full    Patient and/or patient's family given opportunity to ask questions and note understanding and agreeing with therapeutic plan as outlined    Thank you for allowing me to participate in the care of this patient.     Thank You,  Silvia Scott DO    [facility] [place] [facility]  Pager   Answering Service number: [phone]     Subjective:     Seen and examined at bedside.  No CP, SOB, or N/V.  Worked with PT.  Feels better this morning than earlier.  But does not think she is ready to go home just yet.  Pain is still not as controlled.    OBJECTIVE:    Blood pressure 120/53, pulse 92, temperature 99.3 °F (37.4 °C), temperature source Oral, resp. rate 18, height 5' 3\" (1.6 m), weight 212 lb (96.2 kg), SpO2 92%, not currently breastfeeding.    Temp:  [98.2 °F (36.8 °C)-99.3 °F (37.4 °C)] 99.3 °F (37.4 °C)  Pulse:  [82-95] 92  Resp:  [16-18] 18  BP: (120-153)/(53-74) 120/53  SpO2:  [91 %-94 %] 92 %      Intake/Output:    Intake/Output Summary  (Last 24 hours) at 1/19/2024 1553  Last data filed at 1/19/2024 1407  Gross per 24 hour   Intake 886 ml   Output 450 ml   Net 436 ml       Last 3 Weights   01/18/24 1616 212 lb (96.2 kg)   01/18/24 1019 212 lb (96.2 kg)   12/18/23 1133 190 lb (86.2 kg)   02/17/23 1244 205 lb (93 kg)   01/21/22 1318 206 lb 6.4 oz (93.6 kg)       Exam   Gen: No acute distress, alert and oriented x3, no focal neurologic deficits, sitting upright in a chair, appears tired  Heent: NC AT, mucous memb clear, neck supple  Pulm: Lungs clear, normal respiratory effort  CV: Heart with regular rate and rhythm, no peripheral edema  Abd: Abdomen soft, nontender, nondistended, no organomegaly, bowel sounds present  MSK: Expected range of motion, somewhat deconditioned, no clubbing, no cyanosis  Skin: no rashes or lesions  Neuro: AO*3, motor intact, no sensory deficits  Psyc: appropriate mood and affect      Data Review:       Labs:     Recent Labs   Lab 01/19/24  0550   HGB 11.8*   HCT 37.2         No results for input(s): \"GLU\", \"BUN\", \"CREATSERUM\", \"GFRAA\", \"GFRNAA\", \"EGFRCR\", \"CA\", \"NA\", \"K\", \"CL\", \"CO2\" in the last 168 hours.    No results for input(s): \"ALT\", \"AST\", \"ALB\", \"AMYLASE\", \"LIPASE\", \"LDH\" in the last 168 hours.    Invalid input(s): \"ALPHOS\", \"TBIL\", \"DBIL\", \"TPROT\"      Imaging:  XR FLUOROSCOPY C-ARM TIME LESS THAN 1 HOUR (CPT=76000)    Result Date: 1/18/2024  CONCLUSION:  The images obtained and the fluoroscopy which was provided was for planning purposes at the time of the procedure.    LOCATION:  [facility]    Dictated by (CST): Pillo Aguiar MD on 1/18/2024 at 2:51 PM     Finalized by (CST): Pillo Aguiar MD on 1/18/2024 at 2:51 PM          Meds:      sennosides  17.2 mg Oral Nightly    docusate sodium  100 mg Oral BID    methocarbamol  500 mg Oral TID    atorvastatin  40 mg Oral QAM    DULoxetine  20 mg Oral Daily    fluticasone furoate-vilanterol  1 puff Inhalation Daily    levothyroxine  75 mcg Oral Daily    metoprolol  succinate ER  25 mg Oral Daily      lactated ringers Stopped (01/18/24 1614)    lactated ringers 125 mL/hr at 01/18/24 1615     sodium chloride, polyethylene glycol (PEG 3350), magnesium hydroxide, bisacodyl, fleet enema, ondansetron, metoclopramide, diphenhydrAMINE **OR** diphenhydrAMINE, benzocaine-menthol, oxyCODONE **OR** oxyCODONE, HYDROmorphone **OR** HYDROmorphone

## 2024-01-20 VITALS
TEMPERATURE: 99 F | BODY MASS INDEX: 37.56 KG/M2 | DIASTOLIC BLOOD PRESSURE: 57 MMHG | HEART RATE: 101 BPM | WEIGHT: 212 LBS | SYSTOLIC BLOOD PRESSURE: 128 MMHG | HEIGHT: 63 IN | OXYGEN SATURATION: 90 % | RESPIRATION RATE: 16 BRPM

## 2024-01-20 PROCEDURE — 97530 THERAPEUTIC ACTIVITIES: CPT

## 2024-01-20 PROCEDURE — 97535 SELF CARE MNGMENT TRAINING: CPT

## 2024-01-20 PROCEDURE — 97116 GAIT TRAINING THERAPY: CPT

## 2024-01-20 RX ORDER — ACETAMINOPHEN 325 MG/1
650 TABLET ORAL EVERY 6 HOURS PRN
Status: ON HOLD | COMMUNITY
Start: 2024-01-20

## 2024-01-20 RX ORDER — PSEUDOEPHEDRINE HCL 30 MG
100 TABLET ORAL 2 TIMES DAILY
Status: ON HOLD | COMMUNITY
Start: 2024-01-20

## 2024-01-20 RX ORDER — POLYETHYLENE GLYCOL 3350 17 G/17G
17 POWDER, FOR SOLUTION ORAL DAILY PRN
Status: ON HOLD | COMMUNITY
Start: 2024-01-20

## 2024-01-20 RX ORDER — TIZANIDINE 2 MG/1
2 TABLET ORAL EVERY 6 HOURS PRN
Status: ON HOLD | COMMUNITY
Start: 2024-01-20

## 2024-01-20 NOTE — PROGRESS NOTES
Discharge education video played for patient. Discharge AVS reviewed with patient. All questions answered. IV removed. Bedside belongings packed up and returned to patient. Scripts sent to patient pharmacy. Rolling walker given to the patient.

## 2024-01-20 NOTE — PHYSICAL THERAPY NOTE
PHYSICAL THERAPY TREATMENT NOTE - INPATIENT    Room Number: 369/369-A     Session: 1     Number of Visits to Meet Established Goals: 3    Presenting Problem: L2-L3 fusion, removal of hardware L3-L5 and reinstrumentation  Co-Morbidities : seizure disorder, asthma, HTN, HLD, depressive disorder    History related to current admission: Patient is a 73 year old female admitted on 1/18/2024 from home for planned L2-L3 fusion and removal of hardware and re instrumentation    PROCEDURE 1/18/24: Left L2-3 lateral interbody fusion and PSF L2-3 with SHYANNE L3-L5 and re-instrumentation    ASSESSMENT   Pt is making progress towards IP PT goals. Pt progressed to stoop climbing and car transfer training this session. Pt would benefit from continued PT to address deficits in strength, balance, endurance, and tolerance to activity.     DISCHARGE RECOMMENDATIONS  PT Discharge Recommendations: Home with home health PT/OT     PLAN  PT Treatment Plan: Bed mobility;Body mechanics;Coordination;Endurance;Energy conservation;Family education;Patient education;Gait training;Neuromuscular re-educate;Range of motion;Strengthening;Stoop training;Stair training;Transfer training;Balance training  Rehab Potential : Good  Frequency (Obs): Daily    CURRENT GOALS   Goal #1 Patient is able to demonstrate supine - sit EOB @ level: supervision      Goal #2 Patient is able to demonstrate transfers Sit to/from Stand at assistance level: supervision      Goal #3 Patient is able to ambulate 50 feet with assist device: walker - rolling at assistance level: supervision      Goal #4 Pt able to ambulate 100 feet w/ RW at supervision.   Goal #5 Pt able to ascend/descend small stoop step for safe home navigation at supervision.   Goal #6 Pt able to maintain spine precautions 100% of the time.    Goal Comments: Goals established on 1/19/2024 1/20/2024 all goals ongoing       SUBJECTIVE  \"I don't know that I can do this\"     OBJECTIVE  Precautions:  Spine;Seizure;Bed/chair alarm    WEIGHT BEARING RESTRICTION  Weight Bearing Restriction: None                PAIN ASSESSMENT   Rating: 3  Location: back  Management Techniques: Activity promotion;Body mechanics;Relaxation;Repositioning;Breathing techniques    BALANCE                                                                                                                       Static Sitting: Good  Dynamic Sitting: Fair +           Static Standing: Fair -  Dynamic Standing: Fair -    ACTIVITY TOLERANCE   Vitals stable                       O2 WALK         AM-PAC '6-Clicks' INPATIENT SHORT FORM - BASIC MOBILITY  How much difficulty does the patient currently have...  Patient Difficulty: Turning over in bed (including adjusting bedclothes, sheets and blankets)?: A Little   Patient Difficulty: Sitting down on and standing up from a chair with arms (e.g., wheelchair, bedside commode, etc.): A Little   Patient Difficulty: Moving from lying on back to sitting on the side of the bed?: A Little   How much help from another person does the patient currently need...   Help from Another: Moving to and from a bed to a chair (including a wheelchair)?: A Little   Help from Another: Need to walk in hospital room?: A Little   Help from Another: Climbing 3-5 steps with a railing?: A Little       AM-PAC Score:  Raw Score: 18   Approx Degree of Impairment: 46.58%   Standardized Score (AM-PAC Scale): 43.63   CMS Modifier (G-Code): CK    FUNCTIONAL ABILITY STATUS  Gait Assessment   Functional Mobility/Gait Assessment  Gait Assistance: Contact guard assist;Supervision  Distance (ft): 10, 10, 10  Assistive Device: Rolling walker  Pattern: Within Functional Limits  Stairs: Stoop/curb;Car transfer  Stoop/Curb: Pt ascended and descended 1 large stoop step with RW and CGA  Car transfer: Pt educated on car transfer and performed with min A to each LE    Skilled Therapy Provided: Per RN okay to work with pt. Pt received in supine and was  agreeable to PT session.     Bed Mobility:  Rolling: min A via log roll, cues to initiate roll and assist needed at hips and shoulder   Supine<>Sit: min A via log roll    Sit<>Supine: NT     Transfer Mobility:  Sit<>Stand: CGA   Stand<>Sit: CGA   Gait: CGA with RW that progressed to supervision    Pt ambulated to/from bathroom with RW and supervision.    Pt stoop climbed and car transfer trained as above.     Therapist's Comments: Pt educated on role of therapy, goals for session, safety, fall prevention, spine precautions, log roll, activity recommendations, and discharge planning.       Patient End of Session: Up in chair;With  staff;Needs met;Call light within reach;RN aware of session/findings;All patient questions and concerns addressed (left in chair with OT)    PT Session Time: 30 minutes  Gait Training: 10 minutes  Therapeutic Activity: 13 minutes

## 2024-01-20 NOTE — CM/SW NOTE
01/20/24 1400   Discharge disposition   Expected discharge disposition Home-Health   Post Acute Care Provider [facility]   Discharge transportation Private car     DC today.  notified [facility], AVS faxed.     ALEJANDRA [place] - [phone]

## 2024-01-20 NOTE — PROGRESS NOTES
S: Patient awake and sitting in chair. States she feels she is not doing well. She states her pain is 3-4/10. States she is not as mobile as she wishes to be. She states the numbness in her leg is improving.     Inspection:  Awake alert No acute distress. No difficulty breathing     Blood pressure 152/71, pulse 101, temperature 98.7 °F (37.1 °C), temperature source Oral, resp. rate 16, height 5' 3\" (1.6 m), weight 212 lb (96.2 kg), SpO2 96%, not currently breastfeeding.    Recent Labs   Lab 01/19/24  0550   HGB 11.8*   HCT 37.2       Lumbar/Sacral Integument: Incision/ incisions;  Dressing in place,    Strength: Strength of bilateral lower extremities:     Left Right    EHL 5/5 5/5    DF 5/5 5/5    PF 5/5 5/5    Quads 5/5 5/5    IP 5/5 5/5  Sensation: No sensory deficits noted on bilateral lower extremities      HEAD/NECK: Head is normocephalic  EYES: EOMI, JANETTE  SKIN EXAM: Skin is intact, head, neck, trunk and arms/legs. No rashes, mottling or ulcerations.  ABDOMINAL EXAM: Abdomen is soft, NT.      A/P: POD # 2 s/p Left L2-3 lateral interbody fusion and PSF L2-3 with SHYANNE L3-L5 and re-instrumentation      P:   -Activity: As tolerated. Frequent mobilization. Up to chair when not sleeping or ambulating. Appreciate PT/OTreccomendations.  -Analgesia: Discussed realistic pain goals. Multimodal. Wean IV narcotics. Judicious use of narcotics in the elderly.   -Anticoagulation: Moderate risk for DVT/PE. Defer to mechanical DVT ppx given recent history of spine surgery and risk for formation of epidural hematoma. Early mobilation. May resume chemical prophylaxis if needed 5 days post-operative. Heparin Sq may be discussed 2-3 days after surgery if lack of patient mobilization please discuss with me  - PT/Rehab: Gait Training, Stairs, No bending, lifting, twisting, brace to be worn if provided to patient  - Dispo: cleared for discharge once cleared by all services    Jan[facility] Oviedo PA-C
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
## 2024-01-20 NOTE — PLAN OF CARE
Alert and oriented x4. VSS. On RA. . IS encouraged. Telemetry monitoring refused. SCDs and TEDS on BLE. Tolerating diet. Pain controlled with PRN medication. Gauze and tegaderm dressing to lower back, C/D/I. Plan is dc home with [facility]. Call light within reach.

## 2024-01-20 NOTE — PLAN OF CARE
Alert and oriented x 4. Vitals stable on room air, IS encouraged. Telemetry monitoring refused. Pain managed on PO medications.  Dressings clean, dry, intact. Voiding without difficulty. Last BM 11/17. Tolerating regular diet. KALEE, SCD's on bilaterally. Safety precautions in place. PT/OT, discharge to home with home health PT/OT. Plan of care discussed with patient.

## 2024-01-24 NOTE — PAYOR COMM NOTE
--------------  ADMISSION REVIEW     Payor: [employer]  Subscriber #:  [id number]  Authorization Number: [id number]    ELECTIVE SURGICAL ADMISSION  plan for  Left L2-3 lateral interbody fusion and PSF L2-3 with SHYANNE L3-L5 and re-instrumentation       Admit Diagnosis: spondylolistenosis of lumbar, weakness of left hip,lumbar srenosis     Pre-op Diagnosis: spondylolistenosis of lumbar, weakness of left hip,lumbar srenosis     lumbar 2-lumbar 3 prone lateral interbody fusion,lumbar 2-lumbar 3 posterior spinal fusion, removal of hardware lumbar 3-lumbar 4, lumbar 4-lumbar 5 and re instrumentation     ASA: 3   Plan: general

## 2024-01-24 NOTE — PAYOR COMM NOTE
--------------  DISCHARGE REVIEW    Payor: [employer]  Subscriber #:  [id number]  Authorization Number: [id number]    Admit date: 1/18/24  Admit time:   9:32 AM  Discharge Date: 1/20/2024  2:04 PM     Admitting Physician: Sharon Gomez MD  Attending Physician:  No att. providers found  Primary Care Physician: Gordo Antonio MD       Discharge Summary Notes    No notes of this type exist for this encounter.         REVIEWER COMMENTS

## 2024-01-27 ENCOUNTER — TELEPHONE (OUTPATIENT)
Dept: CASE MANAGEMENT | Facility: HOSPITAL | Age: 74
End: 2024-01-27

## 2024-01-27 NOTE — CM/SW NOTE
Dr. Sherley Tinsley spoke with Dr. Shahid Reyes at [facility] and declined the transfer citing the patient may not need to be transferred.

## 2024-02-06 NOTE — DISCHARGE SUMMARY
General Medicine Discharge Summary     Patient ID:  Gloria Mccrary  73 year old  6/28/1950    Admit date: 1/18/2024    Discharge date and time: 1/20/2024  2:04 PM     Attending Physician: No att. providers found     Consults: IP CONSULT TO HOSPITALIST  IP CONSULT TO SOCIAL WORK    Primary Care Physician: Gordo Antonio MD     Reason for admission: elective surgery    Risk For Readmission: low    Discharge Diagnoses: spondylolistenosis of lumbar, weakness of left hip,lumbar srenosis  See Additional Discharge Diagnoses in Hospital Course    Discharged Condition: stable    Follow-up with labs/images appointments: see surgery op    Exam  Gen: No acute distress  Pulm: Lungs clear, normal respiratory effort  CV: Heart with regular rate and rhythm  Abd: Abdomen soft,       HPI:     Hospital Course:   Patient is a 73 year old female with PMH sig for asthma, hypertension, hyperlipidemia, hypothyroidism, depression, spinal stenosis who presents for elective spinal surgery.  Unremarkable postoperative course.  Cleared for discharge with outpatient follow-up.     Spinal stenosis  Status post left L2-3 lateral interbody fusion, PSF L2-3 SHYANNE L3-L5 and re-instrumentation, POD 2  Postop pain  - switched over to p.o. medications  - PT/OT  - Monitor for acute blood loss anemia  - Postop care per spine surgery     Hyperlipidemia  - Resume statin     Hypertension  - Resume metoprolol, hold losartan and amlodipine-resume as needed-BP trend reviewed     Hypothyroidism  - Resume levothyroxine     Asthma, not in exacerbation  - Resume home inhalers          Operative Procedures: Procedure(s) (LRB):  lumbar 2-lumbar 3 prone lateral interbody fusion,lumbar 2-lumbar 3 posterior spinal fusion, removal of hardware lumbar 3-lumbar 4, lumbar 4-lumbar 5 and re instrumentation (N/A)  INTRAOPERATIVE NEURO MONITORING (N/A)  . (N/A)  . (N/A)     Imaging: No results found.      Disposition: home    Activity: activity as tolerated  Diet: regular  diet  Wound Care: keep wound clean and dry  Code Status: No Order  O2: None    Home Medication Changes:   Med list     Medication List        START taking these medications      acetaminophen 325 MG Tabs  Commonly known as: Tylenol     docusate sodium 100 MG Caps  Commonly known as: COLACE     Polyethylene Glycol 3350 17 g Pack  Commonly known as: MIRALAX            CHANGE how you take these medications      tiZANidine 2 MG Tabs  Commonly known as: Zanaflex  What changed:   how much to take  when to take this  reasons to take this  Another medication with the same name was removed. Continue taking this medication, and follow the directions you see here.            CONTINUE taking these medications      acetaminophen-codeine 300-30 MG Tabs  Commonly known as: Tylenol #3     amLODIPine 10 MG Tabs  Commonly known as: Norvasc     atorvastatin 40 MG Tabs  Commonly known as: Lipitor     Benadryl Allergy 25 MG Caps  Generic drug: diphenhydrAMINE     DULoxetine 20 MG Cpep  Commonly known as: Cymbalta  Take 1 capsule (20 mg total) by mouth daily.     fluticasone-salmeterol 250-50 MCG/ACT Aepb  Commonly known as: Advair Diskus  Inhale 1 puff into the lungs 2 (two) times daily.     levothyroxine 75 MCG Tabs  Commonly known as: Synthroid  Take 1 tablet (75 mcg total) by mouth daily.     losartan 100 MG Tabs  Commonly known as: Cozaar  TAKE 1 TABLET BY MOUTH  DAILY     metoprolol succinate ER 25 MG Tb24  Commonly known as: Toprol XL     Ocutabs-Lutein Tabs            STOP taking these medications      aspirin 81 MG Tbec     diclofenac 75 MG Tbec  Commonly known as: Voltaren              FU   Follow-up Information       Gordo Antonio MD Follow up in 1 week(s).    Specialty: Internal Medicine  Contact information:  [address] [address]  [phone]               Sharon Gomez MD Follow up.    Specialties: Surgery, Orthopaedic, SURGERY, ORTHOPEDIC  Why: call for follow up  appointment,  Contact information:  [address]  [address]  [facility] [address]  [phone]                             DC instructions:      Other Discharge Instructions:         Sometimes managing your health at home requires assistance.  The [facility] team has recognized your preference to use [facility].  They can be reached by phone at [phone].  The fax number for your reference is [phone].  A representative from the home health agency will contact you or your family to schedule your first visit.      Sharon Gomez MD        Lumbar and Thoracic Spinal Fusion Discharge Instructions   Spine Center Telephone Number: [phone]  Activity  Mobility  Walking is highly encouraged throughout the day, as tolerated.   Smaller distances are more easily tolerated.  Your goal is to increase the distance you walk each day.  Remember to listen to your body.  Exercise caution in adverse weather or terrain conditions.    Stairs  You may ascend and descend stairs as you tolerate.   Be safe and deliberate. Hold the handrail and advance one step at a time.  Avoid step-stools and ladders.     Restrictions  No twisting, pulling, pushing, or lifting items with a force greater than 10 pounds. (~Gallon of Milk)  No lifting objects above the level of your shoulders.  No bending with your back - you may bend at the knees and hips, maintaining a straight back.  Typically, your restrictions will be lessened at your 6-week follow-up appointment, however we appreciate your compliance with restrictions until directed otherwise.   If you have been provided a walking aid such as a walker or cane, please use as directed.    Sitting  Fatigue is common after surgery and you may find respite in sitting. This is normal and encouraged. Please remember to be deliberately mobile throughout the day. Change your position frequently, as your muscles may get sore and stiff without movement.    It is  recommended to sit in a chair which allows your knees to be at about the same level as your hips. This makes rising from a seated position more feasible.   Avoid overstuffed or plush chairs. Medium to firm chairs with straight backs give better support.   Recliners are OK but you may need to add pillows to avoid sinking into the chair.  Use a raised toilet seat if needed.  Change position every 20-30 minutes throughout the day (example: after sitting, stand, then you can sit again for another 20-30 minutes).    Sexual Activity  You may resume presurgical sexual activity two weeks following surgery as tolerated and when approved by your surgeon.  Discontinue sexual activity if it causes or exacerbates your pain.     Exercise/Fitness Activity  Do not participate in this activity during the two weeks following surgery, unless instructed otherwise.  Your surgeon will lessen restrictions and progress your activity level when appropriate.     Driving  You may NOT drive while taking narcotics or muscle relaxants.  Back and leg pain have been shown to decrease breaking reaction time, particularly after surgery, however, there is no specific time to return to driving.   When you feel able and safe to drive you may return to driving. Slowly advance the complexity of your driving from short distance driving on local streets and in parking lots to longer distance driving including highway driving.     Travel  Avoid air travel and long-distance automobile travel the first two weeks following surgery.     Bracing/Corset  You may not require an immobilizer or brace for your back unless your surgeon has indicated otherwise.   If you have been provided a brace or corset  Use when out of bed except when showering.  You may wear even when toileting.  Anticipate wearing for 6-12 weeks after surgery; exact time to be determined by surgeon.   Apply/remove your brace when sitting/standing at side of your bed.     Sleeping  You will  require plenty of rest and sleep after surgery.   Use the position that provides you the most comfort.  You may use a pillow under knees, between legs, or behind back (if lying on your side), for comfort.   Log roll to turn and rise from a recumbent position.   You may have difficulty sleeping at night. This is not abnormal. If you experience this, try to avoid napping during the day.   It is common to fatigue easily after surgery, this will typically improve one month after your surgery.   Maintain clean sheets and pillow cases.   Rub, with a clean towel to dry.     Return to work  When cleared by surgeon. Discuss specific work activities with your surgeon at your follow-up visit.  Light/sedentary type work may be possible 2 weeks post-op.  Most work activities are permitted approximately 6-12 weeks after surgery.     Dressing and Wound Care  You will have a water-resistant, clear, adhesive dressing over your wound. Please leave this dressing in place for 5 days after surgery.   Remove dressing 5 days after surgery and inspect your wound. You will find glue and mesh tape over your wound, please leave these intact. The dressing should remain in place after discharge from the hospital, as long as it is intact, with a good seal, and there is no leakage.    Start dressing changes 5 days after leaving the hospital with gauze and tegaderm.  Change as needed for moisture in the dressing.    You may start showering 3 days after leaving the hospital.  Keep all dressings on while taking a shower, then place a clean and dry dressing after showering. Do not scrub over the dressing.  Always wash hands before and after dressing changes.   Watch incision for any worsening redness, increased drainage, increased warmth or opening of the incision.  Call your surgeon’s office/answering service if you notice any of these.  Do not apply lotions or ointments on or near incision.    Bathing  You may shower over the adhesive dressing  (Tegaderm) that is in place starting 3 days after surgery.  Have someone inspect your dressing prior to each shower to ensure the integrity of the seal at the dressing edges. If the bandage integrity is compromised, please apply an additional adhesive over the existing dressing.   Do not submerge your wound. No tub bathing, swimming, use of steam rooms or saunas for 6 weeks following surgery and when permitted by your surgeon.  Do not scrub your incision site however you may allow soapy water to run over the site after your first post-operative visit.   Dab the site dry with a clean towel.    Diet and Constipation Prevention  Your appetite may be poor. Your desire for food will return.  Drink plenty of liquids (water, juice) each day to prevent dehydration.  Maintain a healthy, well balanced diet that includes plenty of protein following surgery.  Foods that are high in fiber (bran, vegetables, fruit) are good ways to prevent constipation.   Use an over the counter stool softener while taking narcotics to prevent constipation; the use of medications such a Miralax or Milk of Magnesia may be needed.  An enema or glycerin suppository may be necessary if above measures do not work.  Contact your pharmacist, family doctor, or surgeon for advice.    No Smoking  Smoking will inhibit healing.  Smoking has been clearly shown to inhibit solid bony fusion and is counterproductive to the nature of your surgery.   Even one cigarette a day may cause problems.  Vaping, chewing tobacco, nicotine gum and patches will also inhibit healing.    Pain Management  Expectations  You will have incisional site pain. This is normal and expected after surgery.   You may continue to have leg symptoms which should improve over time.   It is not uncommon 48-72 hours after surgery for patients to experience worsening pain symptoms or return of leg pain/symptoms as post-surgical inflammation sets in. Do not be alarmed as this should gradually  improve.   Pain after surgery is normal.  You may have some return of your pre-surgery symptoms and this is normal. Overall your pain should slowly decrease.  You may have good and bad days.  The pain medication Oxycodone may be taken every 4-6 hours as needed for pain.    You can take one extra strength (500mg) Acetaminophen every 6 hours as needed for pain, do not exceed 3500 mg per 24 hours.  If you received Norco instead of Oxycodone; you can take 1-2 tablets of Norco as needed for pain. Norco contains 325 mg of Acetaminophen, so factor that in when supplementing extra strength Tylenol (500mg) Acetaminophen, to not exceed the 3500mg limit per 24 hours.  The muscle relaxer (Flexeril) should be taken 1-3 times daily as needed for muscle tightness and spasm.  You MUST NOT take any anti-inflammatory pain medications for 3-6 months after surgery as these can prevent spinal fusion.  Which include: Aspirin, Motrin, Advil, Aleve, Naprosyn, Voltaren, Mobic, Indocin, Meloxicam, Ibuprofen, Indomethacin, Naproxen, Diclofenac. (COMPLETE LIST IN GUIDEBOOK)      Non-medication Based Techniques  Relaxation techniques  A way to focus your attention other than on your pain. You are innately capable of producing endorphins, a naturally occurring hormone, that can decrease pain. Some examples of relaxation techniques which may result in a release of endorphins include deep breathing, listening to music, prayer, or meditation.   The use of cold therapy for 20 minutes multiple times per day is encouraged.  You may apply heat to areas of muscle spasm only. Do not apply heat directly over your wound as this can lead to swelling and increased pain.   Gentle stretching may ease muscle spasm.  The idea is to “lengthen” the muscle that is in spasm. Avoid any aggressive bending, lifting, twisting with your neck or back. Gently bending and stretching is OK.  Gentle massage applied to the muscle spasm may help reduce  discomfort.    Medication  Tylenol (acetaminophen) is another excellent medication for pain. Take caution as most narcotics contain acetaminophen. You may take a combined daily maximum 3.5 grams (3500mg) of Tylenol (acetaminophen) in 24 hours. More than this can lead to liver injury.   You will be prescribed a narcotic medication. Take this as needed for breakthrough pain. Gradually wean yourself from prescription medication. You may substitute for Tylenol (acetaminophen).  Consider taking pain medication 30 minutes prior to activity to avoid incisional pain.   Take muscle relaxants as needed only if experiencing muscle spasm.  Please allow medications 30-45 minutes to take effect.  Do NOT drink alcohol while on pain medication.  Monitor need for pain medication refills closely.   Call your pharmacy or physician’s office at least five days before you run out of pain medication. If calling physician office after 3 pm, requests will be addressed on the next business day. Calls for refills on Fridays, holidays, and weekends may result in a processing delay of your refill until the next business day.     Post-op Office Visit  Patients are routinely seen 2 weeks, 6 weeks, 3 months, 6 months, and 1 year after surgery.   You will be scheduled for a post-surgical visit two weeks after surgery. Please confirm this appointment.  It is very important that you keep this appointment.  We recommend making a list of questions to bring with you as it is not uncommon for patients to forget questions while being seen.  Schedule a one week follow up with your Primary Care Physician. It is a good opportunity to review all your medications including any new additions we may provide.     When to contact your surgeon’s team:  Temperature > 101.5°F or 38.5°C.   Increasing pain, swelling, redness, odor, or drainage from your incision.  Separation of incision.  Sudden reappearance of pain that won’t go away with pain medication.  New  numbness or weakness to arms or legs.  Difficulty urinating or if incontinence of your bowel.   Headaches that worsen when standing/sitting and resolve when laid flat.  Any concerns, unanswered questions, or new problems.     Go directly to the ER or CALL 911 if you:  become short of breath  have chest pain  cough up blood  have unexplained anxiety with breathing          Discharge medications reviewed and reconciled.    Total Time Coordinating Care: Greater than 30 minutes    Patient had opportunity to ask questions and state understand and agree with therapeutic plan as outlined    Thank You,    DO Elisabeth Hou Hospitalist  Pager

## 2024-02-07 ENCOUNTER — HOSPITAL ENCOUNTER (INPATIENT)
Facility: HOSPITAL | Age: 74
LOS: 9 days | Discharge: HOME HEALTH | End: 2024-02-16
Attending: STUDENT IN AN ORGANIZED HEALTH CARE EDUCATION/TRAINING PROGRAM | Admitting: INTERNAL MEDICINE
Payer: MEDICARE

## 2024-02-07 ENCOUNTER — APPOINTMENT (OUTPATIENT)
Dept: CT IMAGING | Facility: HOSPITAL | Age: 74
End: 2024-02-07
Attending: STUDENT IN AN ORGANIZED HEALTH CARE EDUCATION/TRAINING PROGRAM
Payer: MEDICARE

## 2024-02-07 DIAGNOSIS — D72.829 LEUKOCYTOSIS, UNSPECIFIED TYPE: ICD-10-CM

## 2024-02-07 DIAGNOSIS — Z98.890 POST-OPERATIVE STATE: ICD-10-CM

## 2024-02-07 DIAGNOSIS — E87.20 LACTIC ACIDOSIS: ICD-10-CM

## 2024-02-07 DIAGNOSIS — R10.9 ABDOMINAL PAIN, ACUTE: Primary | ICD-10-CM

## 2024-02-07 DIAGNOSIS — M17.10 LOCALIZED PRIMARY OSTEOARTHRITIS OF LOWER LEG, UNSPECIFIED LATERALITY: ICD-10-CM

## 2024-02-07 LAB
ALBUMIN SERPL-MCNC: 2.7 G/DL (ref 3.4–5)
ALBUMIN/GLOB SERPL: 0.6 {RATIO} (ref 1–2)
ALP LIVER SERPL-CCNC: 255 U/L
ALT SERPL-CCNC: 26 U/L
ANION GAP SERPL CALC-SCNC: 5 MMOL/L (ref 0–18)
APTT PPP: 31.3 SECONDS (ref 23.3–35.6)
AST SERPL-CCNC: 15 U/L (ref 15–37)
BASOPHILS # BLD AUTO: 0.03 X10(3) UL (ref 0–0.2)
BASOPHILS NFR BLD AUTO: 0.2 %
BILIRUB SERPL-MCNC: 0.4 MG/DL (ref 0.1–2)
BUN BLD-MCNC: 29 MG/DL (ref 9–23)
CALCIUM BLD-MCNC: 9.5 MG/DL (ref 8.5–10.1)
CHLORIDE SERPL-SCNC: 107 MMOL/L (ref 98–112)
CO2 SERPL-SCNC: 29 MMOL/L (ref 21–32)
CREAT BLD-MCNC: 1.16 MG/DL
EGFRCR SERPLBLD CKD-EPI 2021: 50 ML/MIN/1.73M2 (ref 60–?)
EOSINOPHIL # BLD AUTO: 0.05 X10(3) UL (ref 0–0.7)
EOSINOPHIL NFR BLD AUTO: 0.3 %
ERYTHROCYTE [DISTWIDTH] IN BLOOD BY AUTOMATED COUNT: 13.2 %
GLOBULIN PLAS-MCNC: 4.9 G/DL (ref 2.8–4.4)
GLUCOSE BLD-MCNC: 106 MG/DL (ref 70–99)
HCT VFR BLD AUTO: 38.3 %
HGB BLD-MCNC: 11.9 G/DL
IMM GRANULOCYTES # BLD AUTO: 0.12 X10(3) UL (ref 0–1)
IMM GRANULOCYTES NFR BLD: 0.7 %
INR BLD: 1.21 (ref 0.8–1.2)
LACTATE SERPL-SCNC: 0.8 MMOL/L (ref 0.4–2)
LACTATE SERPL-SCNC: 2.4 MMOL/L (ref 0.4–2)
LYMPHOCYTES # BLD AUTO: 1.3 X10(3) UL (ref 1–4)
LYMPHOCYTES NFR BLD AUTO: 7.1 %
MCH RBC QN AUTO: 30.4 PG (ref 26–34)
MCHC RBC AUTO-ENTMCNC: 31.1 G/DL (ref 31–37)
MCV RBC AUTO: 97.7 FL
MONOCYTES # BLD AUTO: 1.49 X10(3) UL (ref 0.1–1)
MONOCYTES NFR BLD AUTO: 8.2 %
NEUTROPHILS # BLD AUTO: 15.22 X10 (3) UL (ref 1.5–7.7)
NEUTROPHILS # BLD AUTO: 15.22 X10(3) UL (ref 1.5–7.7)
NEUTROPHILS NFR BLD AUTO: 83.5 %
OSMOLALITY SERPL CALC.SUM OF ELEC: 298 MOSM/KG (ref 275–295)
PLATELET # BLD AUTO: 608 10(3)UL (ref 150–450)
POTASSIUM SERPL-SCNC: 4.1 MMOL/L (ref 3.5–5.1)
PROT SERPL-MCNC: 7.6 G/DL (ref 6.4–8.2)
PROTHROMBIN TIME: 15.4 SECONDS (ref 11.6–14.8)
RBC # BLD AUTO: 3.92 X10(6)UL
SODIUM SERPL-SCNC: 141 MMOL/L (ref 136–145)
WBC # BLD AUTO: 18.2 X10(3) UL (ref 4–11)

## 2024-02-07 PROCEDURE — 87077 CULTURE AEROBIC IDENTIFY: CPT | Performed by: STUDENT IN AN ORGANIZED HEALTH CARE EDUCATION/TRAINING PROGRAM

## 2024-02-07 PROCEDURE — 74177 CT ABD & PELVIS W/CONTRAST: CPT | Performed by: STUDENT IN AN ORGANIZED HEALTH CARE EDUCATION/TRAINING PROGRAM

## 2024-02-07 PROCEDURE — 96365 THER/PROPH/DIAG IV INF INIT: CPT

## 2024-02-07 PROCEDURE — 87186 SC STD MICRODIL/AGAR DIL: CPT | Performed by: STUDENT IN AN ORGANIZED HEALTH CARE EDUCATION/TRAINING PROGRAM

## 2024-02-07 PROCEDURE — 87070 CULTURE OTHR SPECIMN AEROBIC: CPT | Performed by: STUDENT IN AN ORGANIZED HEALTH CARE EDUCATION/TRAINING PROGRAM

## 2024-02-07 PROCEDURE — 96361 HYDRATE IV INFUSION ADD-ON: CPT

## 2024-02-07 PROCEDURE — 99285 EMERGENCY DEPT VISIT HI MDM: CPT

## 2024-02-07 PROCEDURE — 83605 ASSAY OF LACTIC ACID: CPT | Performed by: STUDENT IN AN ORGANIZED HEALTH CARE EDUCATION/TRAINING PROGRAM

## 2024-02-07 PROCEDURE — 85730 THROMBOPLASTIN TIME PARTIAL: CPT | Performed by: STUDENT IN AN ORGANIZED HEALTH CARE EDUCATION/TRAINING PROGRAM

## 2024-02-07 PROCEDURE — 36415 COLL VENOUS BLD VENIPUNCTURE: CPT

## 2024-02-07 PROCEDURE — 87040 BLOOD CULTURE FOR BACTERIA: CPT | Performed by: STUDENT IN AN ORGANIZED HEALTH CARE EDUCATION/TRAINING PROGRAM

## 2024-02-07 PROCEDURE — 85610 PROTHROMBIN TIME: CPT | Performed by: STUDENT IN AN ORGANIZED HEALTH CARE EDUCATION/TRAINING PROGRAM

## 2024-02-07 PROCEDURE — 85025 COMPLETE CBC W/AUTO DIFF WBC: CPT | Performed by: STUDENT IN AN ORGANIZED HEALTH CARE EDUCATION/TRAINING PROGRAM

## 2024-02-07 PROCEDURE — 87205 SMEAR GRAM STAIN: CPT | Performed by: STUDENT IN AN ORGANIZED HEALTH CARE EDUCATION/TRAINING PROGRAM

## 2024-02-07 PROCEDURE — 96375 TX/PRO/DX INJ NEW DRUG ADDON: CPT

## 2024-02-07 PROCEDURE — 80053 COMPREHEN METABOLIC PANEL: CPT | Performed by: STUDENT IN AN ORGANIZED HEALTH CARE EDUCATION/TRAINING PROGRAM

## 2024-02-07 RX ORDER — SODIUM CHLORIDE 9 MG/ML
INJECTION, SOLUTION INTRAVENOUS CONTINUOUS
Status: ACTIVE | OUTPATIENT
Start: 2024-02-07 | End: 2024-02-07

## 2024-02-07 RX ORDER — HYDROMORPHONE HYDROCHLORIDE 1 MG/ML
0.5 INJECTION, SOLUTION INTRAMUSCULAR; INTRAVENOUS; SUBCUTANEOUS EVERY 30 MIN PRN
Status: DISCONTINUED | OUTPATIENT
Start: 2024-02-07 | End: 2024-02-08

## 2024-02-07 RX ORDER — METOPROLOL SUCCINATE 25 MG/1
25 TABLET, EXTENDED RELEASE ORAL DAILY
Status: DISCONTINUED | OUTPATIENT
Start: 2024-02-08 | End: 2024-02-16

## 2024-02-07 RX ORDER — HYDROMORPHONE HYDROCHLORIDE 1 MG/ML
0.8 INJECTION, SOLUTION INTRAMUSCULAR; INTRAVENOUS; SUBCUTANEOUS EVERY 2 HOUR PRN
Status: DISCONTINUED | OUTPATIENT
Start: 2024-02-07 | End: 2024-02-11

## 2024-02-07 RX ORDER — ATORVASTATIN CALCIUM 40 MG/1
40 TABLET, FILM COATED ORAL EVERY MORNING
Status: DISCONTINUED | OUTPATIENT
Start: 2024-02-08 | End: 2024-02-16

## 2024-02-07 RX ORDER — SODIUM CHLORIDE 9 MG/ML
INJECTION, SOLUTION INTRAVENOUS CONTINUOUS
Status: DISCONTINUED | OUTPATIENT
Start: 2024-02-07 | End: 2024-02-13

## 2024-02-07 RX ORDER — ACETAMINOPHEN 500 MG
500 TABLET ORAL EVERY 4 HOURS PRN
Status: DISCONTINUED | OUTPATIENT
Start: 2024-02-07 | End: 2024-02-16

## 2024-02-07 RX ORDER — HYDROMORPHONE HYDROCHLORIDE 1 MG/ML
0.2 INJECTION, SOLUTION INTRAMUSCULAR; INTRAVENOUS; SUBCUTANEOUS EVERY 2 HOUR PRN
Status: DISCONTINUED | OUTPATIENT
Start: 2024-02-07 | End: 2024-02-13

## 2024-02-07 RX ORDER — SODIUM CHLORIDE 9 MG/ML
125 INJECTION, SOLUTION INTRAVENOUS CONTINUOUS
Status: DISCONTINUED | OUTPATIENT
Start: 2024-02-07 | End: 2024-02-13

## 2024-02-07 RX ORDER — ONDANSETRON 2 MG/ML
4 INJECTION INTRAMUSCULAR; INTRAVENOUS EVERY 6 HOURS PRN
Status: DISCONTINUED | OUTPATIENT
Start: 2024-02-07 | End: 2024-02-16

## 2024-02-07 RX ORDER — DOCUSATE SODIUM 100 MG/1
100 CAPSULE, LIQUID FILLED ORAL 2 TIMES DAILY
Status: DISCONTINUED | OUTPATIENT
Start: 2024-02-07 | End: 2024-02-16

## 2024-02-07 RX ORDER — BISACODYL 10 MG
10 SUPPOSITORY, RECTAL RECTAL
Status: DISCONTINUED | OUTPATIENT
Start: 2024-02-07 | End: 2024-02-08

## 2024-02-07 RX ORDER — METOCLOPRAMIDE HYDROCHLORIDE 5 MG/ML
5 INJECTION INTRAMUSCULAR; INTRAVENOUS EVERY 8 HOURS PRN
Status: DISCONTINUED | OUTPATIENT
Start: 2024-02-07 | End: 2024-02-16

## 2024-02-07 RX ORDER — POLYETHYLENE GLYCOL 3350 17 G/17G
17 POWDER, FOR SOLUTION ORAL DAILY PRN
Status: DISCONTINUED | OUTPATIENT
Start: 2024-02-07 | End: 2024-02-16

## 2024-02-07 RX ORDER — CYCLOBENZAPRINE HCL 10 MG
10 TABLET ORAL 3 TIMES DAILY PRN
Status: DISCONTINUED | OUTPATIENT
Start: 2024-02-07 | End: 2024-02-16

## 2024-02-07 RX ORDER — DIPHENHYDRAMINE HYDROCHLORIDE 50 MG/ML
25 INJECTION INTRAMUSCULAR; INTRAVENOUS ONCE
Status: COMPLETED | OUTPATIENT
Start: 2024-02-07 | End: 2024-02-07

## 2024-02-07 RX ORDER — DULOXETIN HYDROCHLORIDE 20 MG/1
20 CAPSULE, DELAYED RELEASE ORAL DAILY
Status: DISCONTINUED | OUTPATIENT
Start: 2024-02-08 | End: 2024-02-16

## 2024-02-07 RX ORDER — ONDANSETRON 2 MG/ML
4 INJECTION INTRAMUSCULAR; INTRAVENOUS ONCE
Status: COMPLETED | OUTPATIENT
Start: 2024-02-07 | End: 2024-02-07

## 2024-02-07 RX ORDER — FLUTICASONE FUROATE AND VILANTEROL 200; 25 UG/1; UG/1
1 POWDER RESPIRATORY (INHALATION) DAILY
Status: DISCONTINUED | OUTPATIENT
Start: 2024-02-08 | End: 2024-02-07

## 2024-02-07 RX ORDER — METOCLOPRAMIDE HYDROCHLORIDE 5 MG/ML
10 INJECTION INTRAMUSCULAR; INTRAVENOUS ONCE
Status: COMPLETED | OUTPATIENT
Start: 2024-02-07 | End: 2024-02-07

## 2024-02-07 RX ORDER — HYDROMORPHONE HYDROCHLORIDE 1 MG/ML
0.5 INJECTION, SOLUTION INTRAMUSCULAR; INTRAVENOUS; SUBCUTANEOUS EVERY 30 MIN PRN
Status: DISCONTINUED | OUTPATIENT
Start: 2024-02-07 | End: 2024-02-07

## 2024-02-07 RX ORDER — DIAZEPAM 5 MG/ML
5 INJECTION, SOLUTION INTRAMUSCULAR; INTRAVENOUS ONCE
Status: COMPLETED | OUTPATIENT
Start: 2024-02-07 | End: 2024-02-07

## 2024-02-07 RX ORDER — ONDANSETRON 2 MG/ML
4 INJECTION INTRAMUSCULAR; INTRAVENOUS EVERY 4 HOURS PRN
Status: DISCONTINUED | OUTPATIENT
Start: 2024-02-07 | End: 2024-02-07

## 2024-02-07 RX ORDER — LEVOTHYROXINE SODIUM 0.07 MG/1
75 TABLET ORAL
Status: DISCONTINUED | OUTPATIENT
Start: 2024-02-08 | End: 2024-02-16

## 2024-02-07 RX ORDER — HYDROMORPHONE HYDROCHLORIDE 1 MG/ML
0.4 INJECTION, SOLUTION INTRAMUSCULAR; INTRAVENOUS; SUBCUTANEOUS EVERY 2 HOUR PRN
Status: DISCONTINUED | OUTPATIENT
Start: 2024-02-07 | End: 2024-02-11

## 2024-02-07 RX ORDER — SENNOSIDES 8.6 MG
17.2 TABLET ORAL NIGHTLY PRN
Status: DISCONTINUED | OUTPATIENT
Start: 2024-02-07 | End: 2024-02-16

## 2024-02-07 RX ORDER — AMLODIPINE BESYLATE 5 MG/1
10 TABLET ORAL DAILY
Status: DISCONTINUED | OUTPATIENT
Start: 2024-02-07 | End: 2024-02-16

## 2024-02-07 NOTE — ED QUICK NOTES
Orders for admission, patient is aware of plan and ready to go upstairs. Any questions, please call ED RN Toma at extension [phone].     Patient Covid vaccination status: Fully vaccinated     COVID Test Ordered in ED: None    COVID Suspicion at Admission: N/A    Running Infusions:    sodium chloride      None    Mental Status/LOC at time of transport: AOx4    Other pertinent information:   CIWA score: N/A   NIH score:  N/A

## 2024-02-07 NOTE — H&P
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 Of note, had lumbar surgery a few weeks ago. Hurts to move, breathe. Was able to see spine on post op visit end of January and was doing ok. Says 2 days ago, was to have staples removed but was in too much pain to go to office. When seen, no cp/sob/n/f/d/dysuria. Says had such severe pain, she vomited.     History/Other:        History/Other:    Past Medical History:  Past Medical History:   Diagnosis Date    ASTHMA     Asthma     Back problem     Calculus of kidney     Complex partial seizure disorder (HCC)     DEPRESSION     Disorder of thyroid     High blood pressure     High cholesterol     History of blood transfusion     HYPERLIPIDEMIA     HYPERTENSION     Macular degeneration     Muscle weakness     NAVEEN (obstructive sleep apnea) [facility] [place], [place] REDX 7-6-10     AHI 17  RDI 20  REM AHI 34  YULY 76%    OSTEOARTHRITIS     Osteoarthritis     Seizure disorder (HCC)     last seizure was 2018    Sleep apnea     no device     Past Surgical History:   Past Surgical History:   Procedure Laterality Date    BENIGN BIOPSY LEFT      15-20 years ago    CATARACT      COLONOSCOPY      COLONOSCOPY,BIOPSY  2007    Performed by MARY JO RUSH at [facility] [place] [place] [facility], [facility]    HYSTERECTOMY      KNEE REPLACEMENT SURGERY      Right 2010    KNEE REPLACEMENT SURGERY Left     Total    LUMBAR SPINE FUSION,ANTER APPRCH      OTHER SURGICAL HISTORY      Lumbar 3-4 ingrid laminectomy. microdicectomy      Family History:   Family History   Problem  Relation Age of Onset    Hypertension Mother     Diabetes Mother     Breast Cancer Other 60        mat aunt    Breast Cancer Maternal Grandmother 40        Age at dx 40s     Social History:    reports that she quit smoking about 34 years ago. Her smoking use included cigarettes. She has a 15 pack-year smoking history. She has never used smokeless tobacco. She reports that she does not drink alcohol and does not use drugs.       Allergies:   Allergies   Allergen Reactions    Meloxicam CONFUSION       Medications:    Current Facility-Administered Medications on File Prior to Encounter   Medication Dose Route Frequency Provider Last Rate Last Admin    [COMPLETED] ceFAZolin (Ancef) 2 g in 20mL IV syringe premix  2 g Intravenous Once Sharon Gomez MD   2 g at 24 1155    [] sodium chloride 0.9 % IV bolus 500 mL  500 mL Intravenous Once PRN Chiqui Hernandez PA        [COMPLETED] ceFAZolin (Ancef) 2 g in 20mL IV syringe premix  2 g Intravenous Q8H Chiqui Hernandez PA   2 g at 24 0538    [COMPLETED] diazepam (Valium) 5 mg/mL injection 2.5 mg  2.5 mg Intravenous Q10 Min PRN Kaya Myers MD   2.5 mg at 24 1510    [COMPLETED] fentaNYL (Sublimaze) 50 mcg/mL injection             [COMPLETED] ondansetron (Zofran) 4 MG/2ML injection             [COMPLETED] clonidine-EPINEPHrine-ropivacaine-ketorolac pain cocktail syringe (OR)   Injection Once (Intra-Op) Sharon Gomez MD   Given at 24 1403     Current Outpatient Medications on File Prior to Encounter   Medication Sig Dispense Refill    acetaminophen 325 MG Oral Tab Take 2 tablets (650 mg total) by mouth every 6 (six) hours as needed for Pain.      docusate sodium 100 MG Oral Cap Take 100 mg by mouth 2 (two) times daily.      polyethylene glycol, PEG 3350, 17 g Oral Powd Pack Take 17 g by mouth daily as needed.      tiZANidine 2 MG Oral Tab Take 1 tablet (2 mg total) by mouth every 6 (six) hours as needed.      metoprolol  succinate ER 25 MG Oral Tablet 24 Hr Take 1 tablet (25 mg total) by mouth daily.      diphenhydrAMINE (BENADRYL ALLERGY) 25 MG Oral Cap Take 1 capsule (25 mg total) by mouth every 6 (six) hours as needed for Itching.      acetaminophen-codeine 300-30 MG Oral Tab Take 1 tablet by mouth every 4 (four) hours as needed for Pain.      amLODIPine 10 MG Oral Tab Take 1 tablet (10 mg total) by mouth daily.      atorvastatin 40 MG Oral Tab Take 1 tablet (40 mg total) by mouth every morning.      LOSARTAN 100 MG Oral Tab TAKE 1 TABLET BY MOUTH  DAILY 90 tablet 0    DULoxetine HCl 20 MG Oral Cap DR Particles Take 1 capsule (20 mg total) by mouth daily. 90 capsule 3    Levothyroxine Sodium 75 MCG Oral Tab Take 1 tablet (75 mcg total) by mouth daily. 90 tablet 3    fluticasone-salmeterol 250-50 MCG/DOSE Inhalation Aerosol Powder, Breath Activated Inhale 1 puff into the lungs 2 (two) times daily. 60 each 3    Multiple Vitamins-Minerals (OCUTABS-LUTEIN) Oral Tab Take 1 tablet by mouth daily.         Review of Systems:   A comprehensive 14 point review of systems was completed.    Pertinent positives and negatives noted in the HPI.    Objective:     BP (!) 167/80   Pulse 86   Temp 98.6 °F (37 °C) (Oral)   Resp 15   Ht 5' 3\" (1.6 m)   Wt 190 lb (86.2 kg)   SpO2 97%   BMI 33.66 kg/m²      General:  Alert, NAD, appears stated age, no accessory m use, trying to stay still, non toxic appearing   Head:  Normocephalic, without obvious abnormality, atraumatic.   Eyes:  Sclera anicteric,  EOMs intact. Lids wnl.    Ears, nose, throat:  external ears and nose within normal limits, hearing intact       Neck: Supple, symmetrical   Lungs:   Clear to auscultation bilaterally. ok effort   Chest wall:  No tenderness or deformity.   Heart:  Regular rate and rhythm, S1, S2 normal,no LE edema   Abdomen:   Soft, non-tender. Bowel sounds   . Non distended, no peritoneal signs   Extremities: Extremities normal, atraumatic, no edema.   Skin: Skin  color, texture, turgor normal. No visible rashes     Neurologic:  Psychiatric: No facial droop or dysarthria  appropriate affect,  memory ok       Results:         CBC/Chem  Recent Labs   Lab 24  1038   WBC 18.2*   HGB 11.9*   MCV 97.7   .0*   INR 1.21*       Recent Labs   Lab 24  1038      K 4.1      CO2 29.0   BUN 29*   CREATSERUM 1.16*   *   CA 9.5       Recent Labs   Lab 24  1038   ALT 26   AST 15   ALB 2.7*          Additional Diagnostics: EC2024 NSR HR 85       Radiology: CT ABDOMEN+PELVIS(CONTRAST ONLY)(CPT=74177)    Result Date: 2024       CONCLUSION:  1. Loculated fluid collections around the left psoas muscle are noted and most likely a resolved fluid/hemorrhage from recent surgery.  Correlation with clinical findings is necessary to exclude infected fluid collection. 2. Moderate to marked distension right colon and cecum with large amount of fecal debris consistent with constipation. 3. Luminal distention of gallbladder is noted without specific evidence of cholecystitis or calcified cholelithiasis. 4. Small left pleural effusion with dependent atelectasis left lower lobe.              COVID-19  Lab Results   Component Value Date    COVID19 Not Detected 2023    COVID19 NOT DETECTED 2020    COVID19 NOT DETECTED 10/31/2020          Assessment & Plan:     73 year old female with mmp including but not limited to HTN/HL, asthma, seizure disorder, OA, MDD in partial remission is admitted for L sided abdominal pain.     **L sided abdominal pain secondary to  **loculated fluid collections around psoas muscle, possible fluid/hemorrhage resolving from recent surgery  -spine and gen surg consulted in ED, appreciate  -pt afebrile and nontoxic appearing. Hold off on abx for now  -BC, cx pending    **constipation as noted on CT a/p    **leukocytosis-suspect reactive    Stable chronic illnesses:  HTN/HL-home meds. Reassess ARB for AM  Seizure disorder-not  on meds  MDD in partial remission-home med    PPx-scds for now    Dw pt and ED MD            Thank You,  Winsome Hutton MD    [facility]  Answering Service number: [phone]    2/7/2024    Outpatient records or previous hospital records reviewed.     Further recommendations pending patient's clinical course.  [facility] hospitalist to continue to follow patient while in house    Patient and/or patient's family given opportunity to ask questions and note understanding and agreeing with therapeutic plan as outlined    Supplementary Documentation:

## 2024-02-07 NOTE — ED INITIAL ASSESSMENT (HPI)
Patient here for evaluation from home of back pain that radiates to front. Spinal surgery 3 weeks ago. Took 2 Norco prior to arrival. Called EMS to bring patient to ED to have spinal surgeon remove staples. Was supposed to come in yesterday for staple removal. Fusion 20 years ago that failed, knicked spinal cord.

## 2024-02-07 NOTE — ED PROVIDER NOTES
Patient Seen in: [facility] [place] Emergency Department      History     Chief Complaint   Patient presents with    Back Pain     Stated Complaint: back pain for a few days, no injury    Subjective:   HPI    Patient is a 73-year-old female status post back surgery with left lower quadrant abdominal pain and lower back pain.  Patient also reports that she had missed an appointment to get her staples removed yesterday due to severity of her pain and inability to get to the clinic.  She has also been constipated for the last 4 days.  She attributes that to forgetting to take her MiraLAX when she was taking her Norco.  She is on Norco 10/325.  She has been taking 2 tablets every 4 hours.  She reports associated nausea.  Vomited once last night.  No current fever, no dysuria, no hematuria, no loss of sensation, no weakness.  Back pain and abdominal pain worsens with movement.  No other modifying factors.    Objective:   Past Medical History:   Diagnosis Date    ASTHMA     Asthma     Back problem     Calculus of kidney     Complex partial seizure disorder (HCC)     DEPRESSION     Disorder of thyroid     High blood pressure     High cholesterol     History of blood transfusion 2005    HYPERLIPIDEMIA     HYPERTENSION     Macular degeneration     Muscle weakness     NAVEEN (obstructive sleep apnea) [facility] [place] REDX 7-6-10     AHI 17  RDI 20  REM AHI 34  YULY 76%    OSTEOARTHRITIS     Osteoarthritis     Seizure disorder (HCC)     last seizure was 06/2018    Sleep apnea     no device              Past Surgical History:   Procedure Laterality Date    BENIGN BIOPSY LEFT      15-20 years ago    CATARACT      COLONOSCOPY      COLONOSCOPY,BIOPSY  12/04/2007    Performed by MARY JO RUSH at [facility] [place] [facility], [facility]    HYSTERECTOMY      KNEE REPLACEMENT SURGERY      Right 05/2010    KNEE REPLACEMENT SURGERY Left 2017    Total    LUMBAR SPINE FUSION,ANTER APPRCH  2005    OTHER SURGICAL HISTORY  1993    Lumbar 3-4 ingrid laminectomy. microdicectomy                 Social History     Socioeconomic History    Marital status:     Number of children: 0   Occupational History    Occupation: pharmacy tech     Employer[place] [employer] [place]   Tobacco Use    Smoking status: Former     Packs/day: 1.00     Years: 15.00     Additional pack years: 0.00     Total pack years: 15.00     Types: Cigarettes     Quit date: 1990     Years since quittin.1    Smokeless tobacco: Never   Vaping Use    Vaping Use: Never used   Substance and Sexual Activity    Alcohol use: No    Drug use: No     Social Determinants of Health     Food Insecurity: No Food Insecurity (2024)    Food Insecurity     Food Insecurity: Never true   Transportation Needs: No Transportation Needs (2024)    Transportation Needs     Lack of Transportation: No   Housing Stability: Low Risk  (2024)    Housing Stability     Housing Instability: No              Review of Systems    Positive for stated complaint: back pain for a few days, no injury  Other systems are as noted in HPI.  Constitutional and vital signs reviewed.      All other systems reviewed and negative except as noted above.    Physical Exam     ED Triage Vitals   BP 24 0950 152/80   Pulse 24 0950 83   Resp 24 0950 12   Temp 24 0952 98.6 °F (37 °C)   Temp src 24 0952 Oral   SpO2 24 0950 99 %   O2 Device 24 0950 None (Room air)       Current:BP (!) 164/75   Pulse 87   Temp 98.6 °F (37 °C) (Oral)   Resp 15   Ht 160 cm (5' 3\")   Wt 86.2 kg   SpO2 98%   BMI 33.66 kg/m²         Physical Exam    Constitutional: The patient is oriented to person, place, and time, appears in mild painful distress.  HENT:   Head: Normocephalic.   Mouth/Throat: Oropharynx is clear and moist.   Eyes: Conjunctivae and EOM are normal. Pupils are equal, round, and reactive to light.   Neck: Normal range of motion and full passive range of motion without pain. Neck supple.   Cardiovascular: Normal rate, regular  rhythm, normal heart sounds and intact distal pulses.    Pulmonary/Chest: Effort normal and breath sounds normal, no tenderness.   Abdominal: Soft. Bowel sounds are normal.  Marked left lower quadrant tenderness noted.  No guarding, no rebound.  Musculoskeletal: Patient's movement is limited by pain.  Back/flank: Small amount of erythema noted along the incisions held together with staples with serosanguineous drainage involving her left flank and in the left lower back.  Incision in the right lower back is clean, dry and intact with staples in place.  Patient has diffuse tenderness overlying the musculature of her lower back with spasm.  Neurological: alert and oriented to person, place, and time, sensation intact throughout.  No weakness on plantar or dorsiflexion of the feet appreciated.  Skin: Skin is warm and dry.          ED Course/ My interpretations:     Labs Reviewed   COMP METABOLIC PANEL (14) - Abnormal; Notable for the following components:       Result Value    Glucose 106 (*)     BUN 29 (*)     Creatinine 1.16 (*)     Calculated Osmolality 298 (*)     eGFR-Cr 50 (*)     Alkaline Phosphatase 255 (*)     Albumin 2.7 (*)     Globulin  4.9 (*)     A/G Ratio 0.6 (*)     All other components within normal limits   PROTHROMBIN TIME (PT) - Abnormal; Notable for the following components:    PT 15.4 (*)     INR 1.21 (*)     All other components within normal limits   LACTIC ACID, PLASMA - Abnormal; Notable for the following components:    Lactic Acid 2.4 (*)     All other components within normal limits   CBC W/ DIFFERENTIAL - Abnormal; Notable for the following components:    WBC 18.2 (*)     HGB 11.9 (*)     .0 (*)     Neutrophil Absolute Prelim 15.22 (*)     Neutrophil Absolute 15.22 (*)     Monocyte Absolute 1.49 (*)     All other components within normal limits   PTT, ACTIVATED - Normal   LACTIC ACID REFLEX POST POSTIVE - Normal   CBC WITH DIFFERENTIAL WITH PLATELET    Narrative:     The following  orders were created for panel order CBC With Differential With Platelet.  Procedure                               Abnormality         Status                     ---------                               -----------         ------                     CBC W/ DIFFERENTIAL[[id number]]          Abnormal            Final result                 Please view results for these tests on the individual orders.   RAINBOW DRAW LAVENDER   RAINBOW DRAW LIGHT GREEN   RAINBOW DRAW BLUE   BLOOD CULTURE   BLOOD CULTURE   AEROBIC BACTERIAL CULTURE   AEROBIC BACTERIAL CULTURE         Medications given:   ondansetron (Zofran) 4 MG/2ML injection 4 mg    diazepam (Valium) 5 mg/mL injection 5 mg    metoclopramide (Reglan) 5 mg/mL injection 10 mg    diphenhydrAMINE (Benadryl) 50 mg/mL  injection 25 mg    iopamidol 76% (ISOVUE-370) injection for power injector    piperacillin-tazobactam (Zosyn) 4.5 g in dextrose 5% 100 mL IVPB-ADDV    HYDROmorphone (Dilaudid) 1 MG/ML injection 0.5 mg     Patient kept NPO.           MDM      Extensive differential was considered including diverticulitis, cholecystitis, appendicitis, colitis, gastroenteritis, bowel obstruction, and other gi, infectious, vascular, malignant, rheumatologic and other pathology.   Symptoms were addressed with iv fluids and iv medications.  Given severity of patient's symptoms and ED findings decision was made to admit the patient for further treatment and evaluation.  Patient remained hemodynamically stable throughout their emergency department period of observation with no adverse events or symproms reported by the patient.      Patient started on IV antibiotics with the concern for postoperative infection.    Admitted to hospital for further evaluation and treatment.  Surgeon on-call for patient's operative physician consulted.  Hospitalist consulted.    Case discussed with Dr. Shekhar Holland over the phone in detail.  He asked that general surgery be consulted.  I spoke with   Juan general surgery, he will evaluate the patient.    Admission disposition: 2/7/2024  2:26 PM           Disposition and Plan     Clinical Impression:  1. Abdominal pain, acute    2. Post-operative state    3. Leukocytosis, unspecified type    4. Lactic acidosis         Disposition:  Admit  2/7/2024  2:26 pm    Follow-up:  No follow-up provider specified.        Medications Prescribed:  Current Discharge Medication List                            Hospital Problems       Present on Admission  Date Reviewed: 1/21/2022            ICD-10-CM Noted POA    Leukocytosis D72.829 2/7/2024 Unknown           Documentation created with the aid of Dragon voice recognition software.  Although efforts were made to ensure the accuracy of the note, some inaccuracies may persist.

## 2024-02-07 NOTE — CONSULTS
[facility]  Report of Consultation    Gloria Mccrary Patient Status:  Inpatient    1950 MRN [id number]   Location [facility] 3SW-A Attending ASHLEE Holland MD   Hosp Day # 0 PCP Gordo Antonio MD     Reason for Consultation:  Abdominal pain    History of Present Illness:  Gloria Mccrary is a a(n) 73 year old female.  Patient underwent spine surgery on  through a left retroperitoneal approach.  Her postoperative course has been dealt with persistent pain which required 1 rehospitalization.  She states the pain is mainly in her back and in her abdomen.  She presented today secondary to the pain becoming too much to handle.  She describes the pain as being on the left side of her abdomen and back.  She denies nausea or vomiting.  She underwent a CT scan of her abdomen pelvis.  She was found to have a fluid collection present within the retroperitoneal space extending down to the psoas muscle.  There is not appear to be intra-abdominal abnormality or pneumoperitoneum or thickening of the bowel.  She has leukocytosis.    History:  Past Medical History:   Diagnosis Date    ASTHMA     Asthma     Back problem     Calculus of kidney     Complex partial seizure disorder (HCC)     DEPRESSION     Disorder of thyroid     High blood pressure     High cholesterol     History of blood transfusion     HYPERLIPIDEMIA     HYPERTENSION     Macular degeneration     Muscle weakness     NAVEEN (obstructive sleep apnea) [facility] 7-[facility]10     AHI 17  RDI 20  REM AHI 34  YULY 76%    OSTEOARTHRITIS     Osteoarthritis     Seizure disorder (HCC)     last seizure was 2018    Sleep apnea     no device     Past Surgical History:   Procedure Laterality Date    BENIGN BIOPSY LEFT      15-20 years ago    CATARACT      COLONOSCOPY      COLONOSCOPY,BIOPSY  2007    Performed by MARY JO RUSH at [facility], [facility]    HYSTERECTOMY      KNEE REPLACEMENT SURGERY      Right 2010    KNEE REPLACEMENT SURGERY Left  2017    Total    LUMBAR SPINE FUSION,ANTER APPRC  2005    OTHER SURGICAL HISTORY  1993    Lumbar 3-4 ingrid laminectomy. microdicectomy     Family History   Problem Relation Age of Onset    Hypertension Mother     Diabetes Mother     Breast Cancer Other 60        mat aunt    Breast Cancer Maternal Grandmother 40        Age at dx 40s      reports that she quit smoking about 34 years ago. Her smoking use included cigarettes. She has a 15 pack-year smoking history. She has never used smokeless tobacco. She reports that she does not drink alcohol and does not use drugs.C.    Allergies:  Allergies   Allergen Reactions    Meloxicam CONFUSION       Medications:    Current Facility-Administered Medications:     HYDROmorphone (Dilaudid) 1 MG/ML injection 0.5 mg, 0.5 mg, Intravenous, Q30 Min PRN    sodium chloride 0.9% infusion, 125 mL/hr, Intravenous, Continuous    sodium chloride 0.9% infusion, , Intravenous, Continuous    HYDROmorphone (Dilaudid) 1 MG/ML injection 0.2 mg, 0.2 mg, Intravenous, Q2H PRN **OR** HYDROmorphone (Dilaudid) 1 MG/ML injection 0.4 mg, 0.4 mg, Intravenous, Q2H PRN **OR** HYDROmorphone (Dilaudid) 1 MG/ML injection 0.8 mg, 0.8 mg, Intravenous, Q2H PRN    amLODIPine (Norvasc) tab 10 mg, 10 mg, Oral, Daily    [START ON 2/8/2024] atorvastatin (Lipitor) tab 40 mg, 40 mg, Oral, QAM    docusate sodium (COLACE) capsule CAPS 100 mg, 100 mg, Oral, BID    [START ON 2/8/2024] DULoxetine (Cymbalta) DR cap 20 mg, 20 mg, Oral, Daily    fluticasone furoate-vilanterol (Breo Ellipta) 200-25 MCG/ACT inhaler 1 puff, 1 puff, Inhalation, Daily    [START ON 2/8/2024] levothyroxine (Synthroid) tab 75 mcg, 75 mcg, Oral, Daily @ 0700    [START ON 2/8/2024] metoprolol succinate ER (Toprol XL) 24 hr tab 25 mg, 25 mg, Oral, Daily    sodium chloride 0.9% infusion, , Intravenous, Continuous    acetaminophen (Tylenol Extra Strength) tab 500 mg, 500 mg, Oral, Q4H PRN    polyethylene glycol (PEG 3350) (Miralax) 17 g oral packet 17 g, 17  g, Oral, Daily PRN    sennosides (Senokot) tab 17.2 mg, 17.2 mg, Oral, Nightly PRN    bisacodyl (Dulcolax) 10 MG rectal suppository 10 mg, 10 mg, Rectal, Daily PRN    ondansetron (Zofran) 4 MG/2ML injection 4 mg, 4 mg, Intravenous, Q6H PRN    metoclopramide (Reglan) 5 mg/mL injection 5 mg, 5 mg, Intravenous, Q8H PRN    Review of Systems:    GENERAL HEALTH: otherwise feels well, no weight loss, no fever or chills  SKIN: denies any unusual skin rashes or jaundice  HEENT: denies nasal congestion, sinus pain or sore throat; hearing loss negative  RESPIRATORY: denies shortness of breath, wheezing or cough   CARDIOVASCULAR: denies chest pain or CARDENAS; no palpitations   GI: denies nausea, vomiting, constipation, diarrhea; no rectal bleeding; no heartburn  GENITAL/: no dysuria, urgency or frequency, no tea colored urine  MUSCULOSKELETAL: no joint complaints upper or lower extremities  HEMATOLOGY: denies hx anemia; denies bruising or excessive bleeding    Physical Exam:  Blood pressure (!) 171/74, pulse 86, temperature 98.6 °F (37 °C), temperature source Oral, resp. rate 11, height 5' 3\" (1.6 m), weight 190 lb (86.2 kg), SpO2 97%, not currently breastfeeding.    General: Alert, orientated x3.  Cooperative.  No apparent distress.  HEENT: Exam is unremarkable.  Without scleral icterus.  Mucous membranes are moist. Oropharynx is clear.  Lungs: Clear to auscultation bilaterally.  Cardiac: Regular rate and rhythm. No murmur.  Abdomen: Soft, nondistended she is moderately tender in the left side of her abdomen without peritonitis or masses.  The wound is red with some yellowish clear drainage.  It is tender to palpation  Extremities:  No lower extremity edema noted.  Without clubbing or cyanosis.    Skin: Normal texture and turgor.  Neurologic: Cranial nerves are grossly intact.  Motor strength and sensory examination is grossly normal.  No focal neurologic deficit.    Laboratory Data:  Lab Results   Component Value Date    WBC  18.2 02/07/2024    HGB 11.9 02/07/2024    HCT 38.3 02/07/2024    .0 02/07/2024    CREATSERUM 1.16 02/07/2024    BUN 29 02/07/2024     02/07/2024    K 4.1 02/07/2024     02/07/2024    CO2 29.0 02/07/2024     02/07/2024    CA 9.5 02/07/2024    ALB 2.7 02/07/2024    ALKPHO 255 02/07/2024    BILT 0.4 02/07/2024    TP 7.6 02/07/2024    AST 15 02/07/2024    ALT 26 02/07/2024    PTT 31.3 02/07/2024    INR 1.21 02/07/2024    PTP 15.4 02/07/2024       Impression and Plan:  Status post spine surgery through retroperitoneal approach with postoperative pain.  I reviewed the CT scan personally with independent interpretation.  There is fluid in the operative area which etiology is unclear.  In light of her clinical scenario this may be infectious.  There is not appear to be an intra-abdominal process at this time warranting further evaluation.  I will follow with you but have no plans for surgery.  Will defer to spine service in regards to wound management and possible IR drainage of fluid collection      Gordo Martinez MD  2/7/2024  5:51 PM

## 2024-02-08 ENCOUNTER — APPOINTMENT (OUTPATIENT)
Dept: MRI IMAGING | Facility: HOSPITAL | Age: 74
End: 2024-02-08
Attending: ORTHOPAEDIC SURGERY
Payer: MEDICARE

## 2024-02-08 LAB
ANION GAP SERPL CALC-SCNC: 7 MMOL/L (ref 0–18)
BASOPHILS # BLD AUTO: 0.04 X10(3) UL (ref 0–0.2)
BASOPHILS NFR BLD AUTO: 0.2 %
BUN BLD-MCNC: 21 MG/DL (ref 9–23)
CALCIUM BLD-MCNC: 8.9 MG/DL (ref 8.5–10.1)
CHLORIDE SERPL-SCNC: 106 MMOL/L (ref 98–112)
CO2 SERPL-SCNC: 26 MMOL/L (ref 21–32)
CREAT BLD-MCNC: 0.97 MG/DL
EGFRCR SERPLBLD CKD-EPI 2021: 62 ML/MIN/1.73M2 (ref 60–?)
EOSINOPHIL # BLD AUTO: 0.03 X10(3) UL (ref 0–0.7)
EOSINOPHIL NFR BLD AUTO: 0.2 %
ERYTHROCYTE [DISTWIDTH] IN BLOOD BY AUTOMATED COUNT: 13.2 %
GLUCOSE BLD-MCNC: 91 MG/DL (ref 70–99)
HCT VFR BLD AUTO: 36.8 %
HGB BLD-MCNC: 11.5 G/DL
IMM GRANULOCYTES # BLD AUTO: 0.14 X10(3) UL (ref 0–1)
IMM GRANULOCYTES NFR BLD: 0.8 %
LYMPHOCYTES # BLD AUTO: 0.99 X10(3) UL (ref 1–4)
LYMPHOCYTES NFR BLD AUTO: 5.4 %
MAGNESIUM SERPL-MCNC: 2.3 MG/DL (ref 1.6–2.6)
MCH RBC QN AUTO: 30.7 PG (ref 26–34)
MCHC RBC AUTO-ENTMCNC: 31.3 G/DL (ref 31–37)
MCV RBC AUTO: 98.1 FL
MONOCYTES # BLD AUTO: 1.61 X10(3) UL (ref 0.1–1)
MONOCYTES NFR BLD AUTO: 8.8 %
NEUTROPHILS # BLD AUTO: 15.59 X10 (3) UL (ref 1.5–7.7)
NEUTROPHILS # BLD AUTO: 15.59 X10(3) UL (ref 1.5–7.7)
NEUTROPHILS NFR BLD AUTO: 84.6 %
OSMOLALITY SERPL CALC.SUM OF ELEC: 291 MOSM/KG (ref 275–295)
PLATELET # BLD AUTO: 484 10(3)UL (ref 150–450)
POTASSIUM SERPL-SCNC: 4.2 MMOL/L (ref 3.5–5.1)
PROCALCITONIN SERPL-MCNC: 0.17 NG/ML (ref ?–0.16)
RBC # BLD AUTO: 3.75 X10(6)UL
SODIUM SERPL-SCNC: 139 MMOL/L (ref 136–145)
WBC # BLD AUTO: 18.4 X10(3) UL (ref 4–11)

## 2024-02-08 PROCEDURE — 80048 BASIC METABOLIC PNL TOTAL CA: CPT | Performed by: HOSPITALIST

## 2024-02-08 PROCEDURE — 85025 COMPLETE CBC W/AUTO DIFF WBC: CPT | Performed by: HOSPITALIST

## 2024-02-08 PROCEDURE — 84145 PROCALCITONIN (PCT): CPT | Performed by: HOSPITALIST

## 2024-02-08 PROCEDURE — A9575 INJ GADOTERATE MEGLUMI 0.1ML: HCPCS | Performed by: ORTHOPAEDIC SURGERY

## 2024-02-08 PROCEDURE — 83735 ASSAY OF MAGNESIUM: CPT | Performed by: HOSPITALIST

## 2024-02-08 PROCEDURE — 74183 MRI ABD W/O CNTR FLWD CNTR: CPT | Performed by: ORTHOPAEDIC SURGERY

## 2024-02-08 RX ORDER — BISACODYL 10 MG
10 SUPPOSITORY, RECTAL RECTAL
Status: DISCONTINUED | OUTPATIENT
Start: 2024-02-08 | End: 2024-02-16

## 2024-02-08 RX ORDER — DIAZEPAM 5 MG/1
5 TABLET ORAL EVERY 8 HOURS PRN
Status: DISCONTINUED | OUTPATIENT
Start: 2024-02-08 | End: 2024-02-16

## 2024-02-08 RX ORDER — LOSARTAN POTASSIUM 100 MG/1
100 TABLET ORAL DAILY
Status: DISCONTINUED | OUTPATIENT
Start: 2024-02-08 | End: 2024-02-16

## 2024-02-08 RX ORDER — GADOTERATE MEGLUMINE 376.9 MG/ML
20 INJECTION INTRAVENOUS
Status: COMPLETED | OUTPATIENT
Start: 2024-02-08 | End: 2024-02-08

## 2024-02-08 NOTE — PLAN OF CARE
Patient A/O x4, VSS on 2L O2, c/o mod-severe pain. , tele, hx NAVEEN no CPAP. DTV by 0100 via mycirQle, last BM 2/3. Plan for spine to see and manage pain. Safety measures in place.

## 2024-02-08 NOTE — PROGRESS NOTES
[facility]  Progress Note    Gloria Mccrary Patient Status:  Inpatient    1950 MRN [id number]   Location [facility] 3SW-A Attending ASHLEE Holland MD   Hosp Day # 1 PCP Gordo Antonio MD     Subjective:    Patient reports pain is unchanged from yesterday  She was evaluated by spine surgery and MRI has been ordered    Objective/Physical Exam:    Vital Signs:  Blood pressure 139/55, pulse 85, temperature 98.7 °F (37.1 °C), temperature source Oral, resp. rate 17, height 5' 3\" (1.6 m), weight 190 lb (86.2 kg), SpO2 96%, not currently breastfeeding.    General:  Alert, orientated x3.  Cooperative.  No apparent distress.    Lungs:  Non labored breathing    Cardiac:  Regular rate and rhythm.     Abdomen:  soft, non distended, mild tenderness left mid/lower abdomen     Extremities:  No lower extremity edema noted.  Without clubbing or cyanosis.        Labs:  Reviewed    Lab Results   Component Value Date    WBC 18.4 2024    HGB 11.5 2024    HCT 36.8 2024    .0 2024    CREATSERUM 0.97 2024    BUN 21 2024     2024    K 4.2 2024     2024    CO2 26.0 2024    GLU 91 2024    CA 8.9 2024    MG 2.3 2024     Problem List:  Patient Active Problem List   Diagnosis    Asthma    Essential hypertension, benign    Hyperlipidemia    Recurrent major depressive disorder (HCC)    Localized primary osteoarthritis of lower leg    Complex partial seizure disorder (HCC)    Leukocytosis    Abdominal pain, acute    Post-operative state    Leukocytosis, unspecified type    Lactic acidosis       Impression:     72 y/o with abdominal pain post spine surgery  Noted to have retroperitoneal fluid collection  Evaluated by Spine surgery, MRI ordered     Plan:    No plans for acute general surgical intervention  Continue management per Spine surgery  All questions answered  DW KYREE Oliver  [facility]  Surgery  2/8/2024

## 2024-02-08 NOTE — PLAN OF CARE
Patient A&Ox4, VSS on 4L NC O2, IS encouraged. Seizure precautions in place. Patient reports severe pain to LLQ; IV and PO pain medications and muscle relaxer given. Coverlet x3 intact and reinforced. Spine MD at bedside this morning, MRI w/wo contrast pending. MRI screening completed. Voiding freely via purewick, LB 2/4. Plan of care reviewed with patient. Patient demonstrates understanding.     1400: Paged MD regarding skin aundrea in wound cultures. No need for repeat wound cultures per MD at this time

## 2024-02-08 NOTE — PROGRESS NOTES
[facility] Progress Note     Gloriadylan Mccrary Patient Status:  Inpatient    1950 MRN [id number]   [facility] 3SW-A Attending SAHLEE Holland MD   Hosp Day # 1 PCP Gordo Antonio MD     Chief Complaint: f/u L abdominal pain    Subjective:   S:  pain better controlled with IV dilaudid. Awaiting MRI. No sob/n/v/f/c    Review of Systems:   10 point ROS completed and was negative, except for pertinent positive and negatives stated in subjective.    Objective:   Vital signs:  Temp:  [98.4 °F (36.9 °C)-100.1 °F (37.8 °C)] 98.7 °F (37.1 °C)  Pulse:  [] 85  Resp:  [11-20] 17  BP: (125-171)/(55-74) 139/55  SpO2:  [93 %-97 %] 96 %    Wt Readings from Last 6 Encounters:   24 190 lb (86.2 kg)   24 212 lb (96.2 kg)   23 205 lb (93 kg)   22 206 lb 6.4 oz (93.6 kg)   21 195 lb (88.5 kg)   21 210 lb (95.3 kg)         Physical Exam:    Gen: No acute distress, alert and oriented , no accessory m use  Pulm: Lungs clear, ok respiratory effort  CV: Heart with regular rate and rhythm, no edema  Abd: Abdomen soft, nontender, nondistended, bowel sounds present, no peritoneal signs  MSK:   no clubbing, no cyanosis  Skin: no visible rashes    Psych:   appropriate affect,   memory ok    Results:   Diagnostic Data:      Labs:       Recent Labs   Lab 24  1038 24  0452   WBC 18.2* 18.4*   HGB 11.9* 11.5*   MCV 97.7 98.1   .0* 484.0*   INR 1.21*  --        Recent Labs   Lab 24  1038 24  0452    139   K 4.1 4.2    106   CO2 29.0 26.0   BUN 29* 21   CREATSERUM 1.16* 0.97   CA 9.5 8.9   MG  --  2.3   * 91       Recent Labs   Lab 24  1038   ALT 26   AST 15   ALB 2.7*        COVID-19  Lab Results   Component Value Date    COVID19 Not Detected 2023    COVID19 NOT DETECTED 2020    COVID19 NOT DETECTED 10/31/2020       Medications:    amLODIPine  10 mg Oral Daily    atorvastatin  40 mg Oral QAM     docusate sodium  100 mg Oral BID    DULoxetine  20 mg Oral Daily    levothyroxine  75 mcg Oral Daily @ 0700    metoprolol succinate ER  25 mg Oral Daily      sodium chloride 125 mL/hr (02/07/24 1432)    sodium chloride 75 mL/hr at 02/07/24 1750     diazePAM, gadoterate meglumine, HYDROmorphone **OR** HYDROmorphone **OR** HYDROmorphone, acetaminophen, polyethylene glycol (PEG 3350), sennosides, bisacodyl, ondansetron, metoclopramide, cyclobenzaprine          ASSESSMENT / PLAN:      73 year old female with mmp including but not limited to HTN/HL, asthma, seizure disorder, OA, MDD in partial remission is admitted for L sided abdominal pain.      **L sided abdominal pain secondary to  **loculated fluid collections around psoas muscle, possible fluid/hemorrhage resolving from recent surgery  -spine and gen surg consulted in ED, appreciate  -pt afebrile and nontoxic appearing. Hold off on abx for now  -BC, cx pending. Pct added on     **constipation as noted on CT a/p. Bowel regimen    **hypoxia- suspect splinting from pain and narcotic effect. Asymptomatic. IS as able. Monitor       **leukocytosis-suspect reactive     Stable chronic illnesses:  HTN/HL-home meds. Arb resume  Seizure disorder-not on meds  MDD in partial remission-home med     PPx-scds for now    Dw pt and RN     Thank You,  Winsome Hutton MD    [facility]  Internal Medicine  Answering Service number: [phone]    Supplementary Documentation:

## 2024-02-08 NOTE — RESPIRATORY THERAPY NOTE
I spoke to the patient about her inhaler breo. She states that she does not want to take it because every time she gets discharge, no one gives it to her.

## 2024-02-08 NOTE — CM/SW NOTE
02/08/24 1600   CM/SW Referral Data   Referral Source Social Work (self-referral)   Reason for Referral Discharge planning   Informant EMR;Clinical Staff Member   Patient Info   Patient's Current Mental Status at Time of Assessment Alert;Oriented   Patient lives with Spouse/Significant other   Patient Status Prior to Admission   Services in place prior to admission Home Health Care   Home Health Provider Info [facility]   Discharge Needs   Anticipated D/C needs Home health care       Patient is a 74 y/o woman with history of recent lumbar fusion on 1/18 now readmitted with abdominal pain.  Pt current with [facility] for RN/PT.  Updated sent to [facility] and confirmation received that services can be resumed at discharge.  Attempted to meet with pt to confirm DC plan/needs however pt currently off floor for MRI.  / to remain available for support and/or discharge planning.     Laura Stout, [facility]  Discharge Planner  [phone]

## 2024-02-08 NOTE — PLAN OF CARE
Patient A&Ox4, VSS on 2L NC O2, . Seizure precautions in place. Coverlet x3 applied, surgical site images taken. Wound and blood cultures pending. Patient NPO, IV fluids as ordered. Spine and general surgery consult completed in ED. Plan to keep patient NPO, continue pain management and await surgical recommendations. Plan of care reviewed with patient. Patient demonstrates understanding.

## 2024-02-08 NOTE — DISCHARGE INSTRUCTIONS
Sometimes managing your health at home requires assistance.  The [facility] team has recognized your preference to use [facility].  They can be reached by phone at [phone].  The fax number for your reference is [phone].  A representative from the home health agency will contact you or your family to schedule your first visit.      It was recommended that you have IV antibiotics at home to facilitate your recovery and compliment your treatment.  The [facility] team has set up delivery with [facility].  Contact information:  [address], [place] [address].  Phone: [phone].  The estimated delivery is TBD.    If you experienced any difficulties with the delivery, please contact the [facility] Case Management department at [phone].    Per Dr Pratt regarding MANOJ drain:         Bulb (i.e.: Justin Turner/Saul/IR Bulb)     Drain/Tube function: Irrigate    Irrigation frequency: Q 8 hours and then manually aspirate   Amount/Solution: 10 cc Normal Saline    Monitor/Record Output: Q 8 hours      When drainage is <10cc/day x 2 days then abscessogram.       PLEASE CHANGE PICC LINE DRESSING IN AM 2/15/2024    PICC orders:  0.9% NS flush 10 ml as needed every 7 days when not in use.  0.9% NS flush 20 ml after blood draws.  0.9% NS flush 10 ml after each use.  Dressing changes: Transparent dressing without gauze every 7 days or as needed, if soiled, wet, or non-occlusive.   Apply heat to affective area as needed for discomfort.        Medicare Referrals for Psychiatry and Counseling   [facility] - [address]  [address]  [place], [place] [address] [phone]    [facility]  Multiple: [address], [address], [place], [place] -or- [address], [address], [place], [place] -or - [address], [address], [place], [place] -or- [address], [place]  [phone]    [facility]  [address], [address], [place], [place]  [phone]    [facility]  [address], [address], [place], [place]  [phone]    [facility]  [address], [address], [place], [place]  [phone]    [facility] Locations: [facility], and [facility] Locations: [place], [place], [place] and [place]   [phone]    [facility]   [address], [address], [place], [place]  [phone]    Please draw weekly CBC with diff, CMP and CRP for duration of abx therapy and fax results to [facility] at [phone].  - F/u with ID 2 weeks after discharge or sooner if necessary.     If you notice worsening pain, fevers / chills, neurological changes / weakness in legs, let PCP know or go to urgent care/ER.   Your MRI of abdomen incidentally showed small pancreatic cysts, please follow up with PCP as you may need repeat imaging to monitor for interval changes    Drain management as indicated above.      Walk several times a day, be up in chair for meals.  Use assistive devices as needed (walker).    Keep back incisions clean, and dry at all times.  Change back and flank dressings daily and as needed, using sterile gauze and paper tape or coverlets.

## 2024-02-08 NOTE — CONSULTS
Patient: Gloria Mccrary  Medical Record Number: [id number]  Referring Physician:  No ref. provider found  PCP: Gordo Antonio MD      HISTORY OF CHIEF COMPLAINT:    Gloria Mccrary is a 73 year old female, who was admitted for abdominal pain. S/p Left L2-3 lateral interbody fusion and PSF L2-3 with SHYANNE L3-L5 and re-instrumentation on 1/18/2024.  Denies saddle anesthesia or incontinence.     VAS Pain Score:  /10        Past Medical History:   Diagnosis Date    ASTHMA     Asthma     Back problem     Calculus of kidney     Complex partial seizure disorder (HCC)     DEPRESSION     Disorder of thyroid     High blood pressure     High cholesterol     History of blood transfusion 2005    HYPERLIPIDEMIA     HYPERTENSION     Macular degeneration     Muscle weakness     NAVEEN (obstructive sleep apnea) [facility] 7-6-10     AHI 17  RDI 20  REM AHI 34  YULY 76%    OSTEOARTHRITIS     Osteoarthritis     Seizure disorder (HCC)     last seizure was 06/2018    Sleep apnea     no device      Past Surgical History:   Procedure Laterality Date    BENIGN BIOPSY LEFT      15-20 years ago    CATARACT      COLONOSCOPY      COLONOSCOPY,BIOPSY  12/04/2007    Performed by MARY JO RUSH at [facility], [facility]    HYSTERECTOMY      KNEE REPLACEMENT SURGERY      Right 05/2010    KNEE REPLACEMENT SURGERY Left 2017    Total    LUMBAR SPINE FUSION,ANTER APPRCH  2005    OTHER SURGICAL HISTORY  1993    Lumbar 3-4 ingrid laminectomy. microdicectomy      Family History   Problem Relation Age of Onset    Hypertension Mother     Diabetes Mother     Breast Cancer Other 60        mat aunt    Breast Cancer Maternal Grandmother 40        Age at dx 40s      Social History     Socioeconomic History    Marital status:     Number of children: 0   Occupational History    Occupation: pharmacy tech     Employer: [employer]   Tobacco Use    Smoking status: Former     Packs/day: 1.00     Years: 15.00     Additional pack years: 0.00     Total pack years:  15.00     Types: Cigarettes     Quit date: 1990     Years since quittin.1    Smokeless tobacco: Never   Vaping Use    Vaping Use: Never used   Substance and Sexual Activity    Alcohol use: No    Drug use: No      Current Medications:  No current outpatient medications on file.        REVIEW OF SYSTEMS    A comprehensive 10 point review of systems was completed.  Pertinent positives and negatives noted in the the HPI.     PHYSICAL EXAMIMATION   PHYSICAL EXAMINATION: Gloria Mccrary is a 73 year old female who is observed sitting in the exam room alert and oriented times three.   RESPIRATORY RATE: 18 She looks consistent her stated age.    /55 (BP Location: Left arm)   Pulse 85   Temp 98.7 °F (37.1 °C) (Oral)   Resp 17   Ht 5' 3\" (1.6 m)   Wt 190 lb (86.2 kg)   SpO2 96%   BMI 33.66 kg/m²   The patient is well developed, appropriately built     Ambulation: deferred     Incisions c/d/I. No signs of infections    Strength:  Patient demonstrates normal motor examination 5/5 L2-S1    Sensation:  Patient demonstrates normal sensory examination 2/2       EYES: EOMI, JANETTE  SKIN EXAM: Skin is intact, head, neck, trunk and arms/legs. No rashes, mottling or ulcerations.  LYMPH EXAM: There is no edema in bilateral lower extremities.  VASCULAR EXAM:  pulses are normal bilaterally, with good distal perfusion. No clubbing or cyanosis. Calves supple, NT   ABDOMINAL EXAM: Abdomen is soft, NT, BS present  MUSCULOSKELETAL: Outlined above                                                       MEDICAL DECISION MAKING:     Plan:   [unfilled]      Gloria Mccrary is a 73 year old with abdominal pain s/p Left L2-3 lateral interbody fusion and PSF L2-3 with SHYANNE L3-L5 and re-instrumentation. MRI abdomin with and without contrast. Will monitor.     After reviewing abdominal MRI the patients symptoms do not appear to be coming from coming from the spine or an infection. Recommend consult to GI. We will continue to  monitor.       The patient indicates understanding of these issues and agrees to the plan.    Thank you very much.     Respectfully yours,            Dr. Patricia Hernandez PA-C  Spine and Scoliosis Surgeon  [facility]

## 2024-02-08 NOTE — PAYOR COMM NOTE
--------------  ADMISSION REVIEW     Payor: [employer]  Subscriber #:  [id number]  Authorization Number: [id number]    Admit date: 2/7/24  Admit time:  5:22 PM       Patient Seen in: [facility]    History   Stated Complaint: back pain for a few days, no injury    Patient is a 73-year-old female status post back surgery with left lower quadrant abdominal pain and lower back pain.  Patient also reports that she had missed an appointment to get her staples removed yesterday due to severity of her pain and inability to get to the clinic.  She has also been constipated for the last 4 days.  She attributes that to forgetting to take her MiraLAX when she was taking her Norco.  She is on Norco 10/325.  She has been taking 2 tablets every 4 hours.  She reports associated nausea.  Vomited once last night.  No current fever, no dysuria, no hematuria, no loss of sensation, no weakness.  Back pain and abdominal pain worsens with movement.  No other modifying factors.    Objective:   Past Medical History:   Diagnosis Date    ASTHMA     Asthma     Back problem     Calculus of kidney     Complex partial seizure disorder (HCC)     DEPRESSION     Disorder of thyroid     High blood pressure     High cholesterol     History of blood transfusion 2005    HYPERLIPIDEMIA     HYPERTENSION     Macular degeneration     Muscle weakness     NAVEEN (obstructive sleep apnea) [facility] [place] [facility] 7-6-10     AHI 17  RDI 20  REM AHI 34  YULY 76%    OSTEOARTHRITIS     Osteoarthritis     Seizure disorder (HCC)     last seizure was 06/2018    Sleep apnea     no device     Past Surgical History:   Procedure Laterality Date    BENIGN BIOPSY LEFT      15-20 years ago    CATARACT      COLONOSCOPY      COLONOSCOPY,BIOPSY  12/04/2007    Performed by MARY JO RUSH at [facility] [place] [facility], [facility]    HYSTERECTOMY      KNEE REPLACEMENT SURGERY      Right 05/2010    KNEE REPLACEMENT SURGERY Left 2017    Total    LUMBAR SPINE FUSION,ANTER  [facility]  2005    OTHER SURGICAL HISTORY  1993    Lumbar 3-4 ingrid laminectomy. microdicectomy     Physical Exam     ED Triage Vitals   BP 02/07/24 0950 152/80   Pulse 02/07/24 0950 83   Resp 02/07/24 0950 12   Temp 02/07/24 0952 98.6 °F (37 °C)   Temp src 02/07/24 0952 Oral   SpO2 02/07/24 0950 99 %   O2 Device 02/07/24 0950 None (Room air)     Current:BP (!) 164/75   Pulse 87   Temp 98.6 °F (37 °C) (Oral)   Resp 15   Ht 160 cm (5' 3\")   Wt 86.2 kg   SpO2 98%   BMI 33.66 kg/m²     Physical Exam    Constitutional: The patient is oriented to person, place, and time, appears in mild painful distress.    Abdominal: Soft. Bowel sounds are normal.  Marked left lower quadrant tenderness noted.  No guarding, no rebound.  Musculoskeletal: Patient's movement is limited by pain.  Back/flank: Small amount of erythema noted along the incisions held together with staples with serosanguineous drainage involving her left flank and in the left lower back.  Incision in the right lower back is clean, dry and intact with staples in place.  Patient has diffuse tenderness overlying the musculature of her lower back with spasm.    Labs Reviewed   COMP METABOLIC PANEL (14) - Abnormal; Notable for the following components:       Result Value    Glucose 106 (*)     BUN 29 (*)     Creatinine 1.16 (*)     Calculated Osmolality 298 (*)     eGFR-Cr 50 (*)     Alkaline Phosphatase 255 (*)     Albumin 2.7 (*)     Globulin  4.9 (*)     A/G Ratio 0.6 (*)     All other components within normal limits   PROTHROMBIN TIME (PT) - Abnormal; Notable for the following components:    PT 15.4 (*)     INR 1.21 (*)     All other components within normal limits   LACTIC ACID, PLASMA - Abnormal; Notable for the following components:    Lactic Acid 2.4 (*)     All other components within normal limits   CBC W/ DIFFERENTIAL - Abnormal; Notable for the following components:    WBC 18.2 (*)     HGB 11.9 (*)     .0 (*)     Neutrophil Absolute Prelim 15.22  (*)     Neutrophil Absolute 15.22 (*)     Monocyte Absolute 1.49 (*)     All other components within normal limits   BLOOD CULTURE   BLOOD CULTURE   AEROBIC BACTERIAL CULTURE   AEROBIC BACTERIAL CULTURE     Medications given:   ondansetron (Zofran) 4 MG/2ML injection 4 mg    diazepam (Valium) 5 mg/mL injection 5 mg    metoclopramide (Reglan) 5 mg/mL injection 10 mg    diphenhydrAMINE (Benadryl) 50 mg/mL  injection 25 mg    iopamidol 76% (ISOVUE-370) injection for power injector    piperacillin-tazobactam (Zosyn) 4.5 g in dextrose 5% 100 mL IVPB-ADDV    HYDROmorphone (Dilaudid) 1 MG/ML injection 0.5 mg     Patient kept NPO.    MDM      Symptoms were addressed with iv fluids and iv medications.  Given severity of patient's symptoms and ED findings decision was made to admit the patient for further treatment and evaluation.  Patient remained hemodynamically stable throughout their emergency department period of observation with no adverse events or symproms reported by the patient.    Patient started on IV antibiotics with the concern for postoperative infection.  Admitted to hospital for further evaluation and treatment.  Surgeon on-call for patient's operative physician consulted.  Hospitalist consulted.  Case discussed with Dr. Shekhar Holland over the phone in detail.  He asked that general surgery be consulted.  I spoke with Dr. Martinez general surgery, he will evaluate the patient.  Disposition and Plan     Clinical Impression:  1. Abdominal pain, acute    2. Post-operative state    3. Leukocytosis, unspecified type    4. Lactic acidosis       Hospital Problems       Present on Admission  Date Reviewed: 1/21/2022            ICD-10-CM Noted POA    Leukocytosis D72.829 2/7/2024 Unknown             History and Physical      Gloria Mccrary is a 73 year old female with mmp including but not limited to HTN/HL, asthma, seizure disorder, OA, MDD in partial remission is admitted for L sided abdominal pain. Of note, had lumbar  surgery a few weeks ago. Hurts to move, breathe. Was able to see spine on post op visit end of January and was doing ok. Says 2 days ago, was to have staples removed but was in too much pain to go to office. When seen, no cp/sob/n/f/d/dysuria. Says had such severe pain, she vomited.         CT ABDOMEN+PELVIS    1. Loculated fluid collections around the left psoas muscle are noted and most likely a resolved fluid/hemorrhage from recent surgery.  Correlation with clinical findings is necessary to exclude infected fluid collection. 2. Moderate to marked distension right colon and cecum with large amount of fecal debris consistent with constipation. 3. Luminal distention of gallbladder is noted without specific evidence of cholecystitis or calcified cholelithiasis. 4. Small left pleural effusion with dependent atelectasis left lower lobe.          Assessment & Plan:   73 year old female with mmp including but not limited to HTN/HL, asthma, seizure disorder, OA, MDD in partial remission is admitted for L sided abdominal pain.      **L sided abdominal pain secondary to  **loculated fluid collections around psoas muscle, possible fluid/hemorrhage resolving from recent surgery  -spine and gen surg consulted in ED, appreciate  -pt afebrile and nontoxic appearing. Hold off on abx for now  -BC, cx pending     **constipation as noted on CT a/p     **leukocytosis-suspect reactive     Stable chronic illnesses:  HTN/HL-home meds. Reassess ARB for AM  Seizure disorder-not on meds  MDD in partial remission-home med     PPx-scds for now      GENERAL SURGERY:    Gloria Mccrary is a a(n) 73 year old female.  Patient underwent spine surgery on January 17 through a left retroperitoneal approach.  Her postoperative course has been dealt with persistent pain which required 1 rehospitalization.  She states the pain is mainly in her back and in her abdomen.  She presented today secondary to the pain becoming too much to handle.  She  describes the pain as being on the left side of her abdomen and back.  She denies nausea or vomiting.  She underwent a CT scan of her abdomen pelvis.  She was found to have a fluid collection present within the retroperitoneal space extending down to the psoas muscle.  There is not appear to be intra-abdominal abnormality or pneumoperitoneum or thickening of the bowel.  She has leukocytosis.     Impression and Plan:  Status post spine surgery through retroperitoneal approach with postoperative pain.  I reviewed the CT scan personally with independent interpretation.  There is fluid in the operative area which etiology is unclear.  In light of her clinical scenario this may be infectious.  There is not appear to be an intra-abdominal process at this time warranting further evaluation.  I will follow with you but have no plans for surgery.  Will defer to spine service in regards to wound management and possible IR drainage of fluid collection      2/8:    ORTHO SPINE::    Gloria Mccrary is a 73 year old female, who was admitted for abdominal pain. S/p Left L2-3 lateral interbody fusion and PSF L2-3 with SHYANNE L3-L5 and re-instrumentation on 1/18/2024.  Denies saddle anesthesia or incontinence.     PHYSICAL EXAMIMATION   PHYSICAL EXAMINATION: Gloria Mccrary is a 73 year old female who is observed sitting in the exam room alert and oriented times three.   RESPIRATORY RATE: 18 She looks consistent her stated age.    /55 (BP Location: Left arm)   Pulse 85   Temp 98.7 °F (37.1 °C) (Oral)   Resp 17   Ht 5' 3\" (1.6 m)   Wt 190 lb (86.2 kg)   SpO2 96%   BMI 33.66 kg/m²   The patient is well developed, appropriately built      Ambulation: deferred      Incisions c/d/I. No signs of infections     Strength:         Patient demonstrates normal motor examination 5/5 L2-S1     Sensation:  Patient demonstrates normal sensory examination 2/2      Gloria Mccrary is a 73 year old with abdominal pain s/p Left L2-3 lateral  interbody fusion and PSF L2-3 with SHYANNE L3-L5 and re-instrumentation. MRI abdomin with and without contrast. Will monitor.       MEDICATIONS ADMINISTERED IN LAST 1 DAY:  acetaminophen (Tylenol Extra Strength) tab 500 mg       Date Action Dose Route User    2/8/2024 0848 Given 500 mg Oral Nanyc Russo RN          amLODIPine (Norvasc) tab 10 mg       Date Action Dose Route User    2/8/2024 0847 Given 10 mg Oral Nancy Russo RN          atorvastatin (Lipitor) tab 40 mg       Date Action Dose Route User    2/8/2024 0848 Given 40 mg Oral Nancy Russo RN          cyclobenzaprine (Flexeril) tab 10 mg       Date Action Dose Route User    2/8/2024 0518 Given 10 mg Oral Valorie Dumont RN    2/7/2024 2123 Given 10 mg Oral Valorie Dumont RN          diazePAM (Valium) tab 5 mg       Date Action Dose Route User    2/8/2024 1018 Given 5 mg Oral Nancy Russo RN    2/8/2024 0110 Given 5 mg Oral Valorie Dumont RN          docusate sodium (Colace) cap 100 mg       Date Action Dose Route User    2/8/2024 0848 Given 100 mg Oral Nancy Russo RN          DULoxetine (Cymbalta) DR cap 20 mg       Date Action Dose Route User    2/8/2024 0847 Given 20 mg Oral Nancy Russo RN          HYDROmorphone (Dilaudid) 1 MG/ML injection 0.5 mg       Date Action Dose Route User    2/7/2024 1433 Given 0.5 mg Intravenous MikaToma RN          HYDROmorphone (Dilaudid) 1 MG/ML injection 0.8 mg       Date Action Dose Route User    2/8/2024 0846 Given 0.8 mg Intravenous Nancy Russo RN    2/8/2024 0638 Given 0.8 mg Intravenous Valorie Dumont RN    2/8/2024 0442 Given 0.8 mg Intravenous BlancoEdwige casillas RN    2/8/2024 0021 Given 0.8 mg Intravenous Valorie Dumont RN    2/7/2024 2228 Given 0.8 mg Intravenous Valorie Dumont RN    2/7/2024 2037 Given 0.8 mg Intravenous Valorie Dumont RN    2/7/2024 1826 Given 0.8 mg Intravenous Nancy Rusos RN          metoprolol succinate ER (Toprol XL) 24 hr tab 25 mg       Date  Action Dose Route User    2/8/2024 0848 Given 25 mg Oral Nancy Russo RN          ondansetron (Zofran) 4 MG/2ML injection 4 mg       Date Action Dose Route User    2/7/2024 1826 Given 4 mg Intravenous Nancy Russo RN          piperacillin-tazobactam (Zosyn) 4.5 g in dextrose 5% 100 mL IVPB-ADDV       Date Action Dose Route User    2/7/2024 1433 New Bag 4.5 g Intravenous Toma Brennan RN          sodium chloride 0.9% infusion       Date Action Dose Route User    2/7/2024 1432 New Bag 125 mL/hr Intravenous Toma Brennan RN          sodium chloride 0.9% infusion  75/HR      Date Action Dose Route User    2/7/2024 1750 New Bag (none) Intravenous Nancy Russo RN            Vitals (last day)       Date/Time Temp Pulse Resp BP SpO2 Weight O2 Device O2 Flow Rate (L/min) [facility]    02/08/24 1132 98.7 °F (37.1 °C) 85 17 139/55 96 % -- Nasal cannula 4 L/min GG    02/08/24 0717 98.6 °F (37 °C) 94 17 125/57 93 % -- Nasal cannula 2 L/min GG    02/08/24 0400 99.2 °F (37.3 °C) 100 18 148/72 93 % -- -- -- AF    02/08/24 0000 98.4 °F (36.9 °C) 96 18 132/57 93 % -- -- -- AF    02/07/24 2300 -- 94 -- -- 93 % -- -- -- AF    02/07/24 2100 -- 96 -- -- 95 % -- -- -- AF    02/07/24 2000 99 °F (37.2 °C) 91 18 152/61 94 % -- -- -- AF    02/07/24 1805 100.1 °F (37.8 °C) 75 20 160/71 95 % -- Nasal cannula 2 L/min CM    02/07/24 1746 -- -- -- -- -- 190 lb -- -- DC    02/07/24 1630 -- 86 11 171/74 97 % -- Nasal cannula 2 L/min JR    02/07/24 1545 -- 86 15 -- 97 % -- -- --     02/07/24 1530 -- 82 14 167/80 98 % -- Nasal cannula 2 L/min     02/07/24 1500 -- 91 15 155/89 97 % -- Nasal cannula 2 L/min     02/07/24 1430 -- 89 11 150/75 96 % -- Nasal cannula 2 L/min     02/07/24 1400 -- 87 15 164/75 98 % -- Nasal cannula 4 L/min     02/07/24 1345 -- 85 13 -- 98 % -- Nasal cannula 4 L/min     02/07/24 1330 -- 85 12 151/79 98 % -- Nasal cannula 4 L/min     02/07/24 1315 -- 84 11 154/83 98 % -- Nasal cannula 4 L/min     02/07/24  1200 -- 75 14 132/65 98 % -- Nasal cannula --     02/07/24 1145 -- 80 18 -- 99 % -- Nasal cannula 4 L/min     02/07/24 1130 -- 83 16 -- 98 % -- -- --     02/07/24 1115 -- 87 20 -- 98 % -- -- --     02/07/24 1100 -- 79 14 -- 97 % -- -- --     02/07/24 1053 -- 72 12 -- 97 % -- Nasal cannula 4 L/min     02/07/24 1051 -- 77 18 -- 85 % -- None (Room air) --     02/07/24 1045 -- 79 14 -- 93 % -- -- --     02/07/24 1030 -- 79 13 -- 96 % -- -- --     02/07/24 1015 -- 81 16 -- 94 % -- -- --     02/07/24 1000 -- 84 12 152/80 97 % -- None (Room air) --     02/07/24 0952 98.6 °F (37 °C) -- -- -- -- -- -- --     02/07/24 0950 -- 83 12 152/80 99 % 190 lb None (Room air) --

## 2024-02-09 ENCOUNTER — APPOINTMENT (OUTPATIENT)
Dept: CT IMAGING | Facility: HOSPITAL | Age: 74
End: 2024-02-09
Attending: HOSPITALIST
Payer: MEDICARE

## 2024-02-09 LAB
ANION GAP SERPL CALC-SCNC: 4 MMOL/L (ref 0–18)
BUN BLD-MCNC: 24 MG/DL (ref 9–23)
CALCIUM BLD-MCNC: 9.3 MG/DL (ref 8.5–10.1)
CHLORIDE SERPL-SCNC: 103 MMOL/L (ref 98–112)
CO2 SERPL-SCNC: 30 MMOL/L (ref 21–32)
CREAT BLD-MCNC: 0.88 MG/DL
EGFRCR SERPLBLD CKD-EPI 2021: 70 ML/MIN/1.73M2 (ref 60–?)
ERYTHROCYTE [DISTWIDTH] IN BLOOD BY AUTOMATED COUNT: 13 %
GLUCOSE BLD-MCNC: 136 MG/DL (ref 70–99)
HCT VFR BLD AUTO: 39.1 %
HGB BLD-MCNC: 12.3 G/DL
INR BLD: 1.21 (ref 0.8–1.2)
MAGNESIUM SERPL-MCNC: 2.4 MG/DL (ref 1.6–2.6)
MCH RBC QN AUTO: 30.5 PG (ref 26–34)
MCHC RBC AUTO-ENTMCNC: 31.5 G/DL (ref 31–37)
MCV RBC AUTO: 97 FL
OSMOLALITY SERPL CALC.SUM OF ELEC: 290 MOSM/KG (ref 275–295)
PLATELET # BLD AUTO: 497 10(3)UL (ref 150–450)
POTASSIUM SERPL-SCNC: 4 MMOL/L (ref 3.5–5.1)
PROTHROMBIN TIME: 15.3 SECONDS (ref 11.6–14.8)
RBC # BLD AUTO: 4.03 X10(6)UL
SODIUM SERPL-SCNC: 137 MMOL/L (ref 136–145)
WBC # BLD AUTO: 18.5 X10(3) UL (ref 4–11)

## 2024-02-09 PROCEDURE — 87070 CULTURE OTHR SPECIMN AEROBIC: CPT | Performed by: HOSPITALIST

## 2024-02-09 PROCEDURE — 87075 CULTR BACTERIA EXCEPT BLOOD: CPT | Performed by: HOSPITALIST

## 2024-02-09 PROCEDURE — 83735 ASSAY OF MAGNESIUM: CPT | Performed by: HOSPITALIST

## 2024-02-09 PROCEDURE — 87205 SMEAR GRAM STAIN: CPT | Performed by: HOSPITALIST

## 2024-02-09 PROCEDURE — 49406 IMAGE CATH FLUID PERI/RETRO: CPT | Performed by: HOSPITALIST

## 2024-02-09 PROCEDURE — 85610 PROTHROMBIN TIME: CPT | Performed by: HOSPITALIST

## 2024-02-09 PROCEDURE — 0W9H30Z DRAINAGE OF RETROPERITONEUM WITH DRAINAGE DEVICE, PERCUTANEOUS APPROACH: ICD-10-PCS | Performed by: RADIOLOGY

## 2024-02-09 PROCEDURE — 99153 MOD SED SAME PHYS/QHP EA: CPT | Performed by: HOSPITALIST

## 2024-02-09 PROCEDURE — 85027 COMPLETE CBC AUTOMATED: CPT | Performed by: HOSPITALIST

## 2024-02-09 PROCEDURE — 99152 MOD SED SAME PHYS/QHP 5/>YRS: CPT | Performed by: HOSPITALIST

## 2024-02-09 PROCEDURE — 80048 BASIC METABOLIC PNL TOTAL CA: CPT | Performed by: HOSPITALIST

## 2024-02-09 RX ORDER — MIDAZOLAM HYDROCHLORIDE 1 MG/ML
INJECTION INTRAMUSCULAR; INTRAVENOUS
Status: COMPLETED
Start: 2024-02-09 | End: 2024-02-09

## 2024-02-09 RX ORDER — NALOXONE HYDROCHLORIDE 0.4 MG/ML
80 INJECTION, SOLUTION INTRAMUSCULAR; INTRAVENOUS; SUBCUTANEOUS AS NEEDED
Status: DISCONTINUED | OUTPATIENT
Start: 2024-02-09 | End: 2024-02-09 | Stop reason: HOSPADM

## 2024-02-09 RX ORDER — MIDAZOLAM HYDROCHLORIDE 1 MG/ML
1 INJECTION INTRAMUSCULAR; INTRAVENOUS EVERY 5 MIN PRN
Status: DISCONTINUED | OUTPATIENT
Start: 2024-02-09 | End: 2024-02-09 | Stop reason: HOSPADM

## 2024-02-09 RX ORDER — RUFINAMIDE 40 MG/ML
1 SUSPENSION ORAL DAILY
Status: DISCONTINUED | OUTPATIENT
Start: 2024-02-10 | End: 2024-02-16

## 2024-02-09 RX ORDER — FLUMAZENIL 0.1 MG/ML
0.2 INJECTION INTRAVENOUS AS NEEDED
Status: DISCONTINUED | OUTPATIENT
Start: 2024-02-09 | End: 2024-02-09 | Stop reason: HOSPADM

## 2024-02-09 RX ORDER — SODIUM CHLORIDE 9 MG/ML
INJECTION, SOLUTION INTRAVENOUS CONTINUOUS
Status: DISCONTINUED | OUTPATIENT
Start: 2024-02-09 | End: 2024-02-09 | Stop reason: HOSPADM

## 2024-02-09 RX ORDER — HYDROCODONE BITARTRATE AND ACETAMINOPHEN 10; 325 MG/1; MG/1
1 TABLET ORAL EVERY 6 HOURS PRN
Status: DISCONTINUED | OUTPATIENT
Start: 2024-02-09 | End: 2024-02-16

## 2024-02-09 RX ORDER — HYDROCODONE BITARTRATE AND ACETAMINOPHEN 10; 325 MG/1; MG/1
2 TABLET ORAL EVERY 6 HOURS PRN
Status: DISCONTINUED | OUTPATIENT
Start: 2024-02-09 | End: 2024-02-16

## 2024-02-09 NOTE — PHYSICAL THERAPY NOTE
PT order received. Chart reviewed. Pt currently off the floor for CT aspiration. Will follow up as pt remains medically appropriate and as schedule allows. RN aware.

## 2024-02-09 NOTE — PAYOR COMM NOTE
--------------  2/9 CONTINUED STAY REVIEW    Payor: [facility] MEDICARE  Subscriber #:  [id number]  Authorization Number: [id number]    HOSPITALIST:    S:  laying in bed, still with exquisite pain with movement/standing--incision site w/ staples draining yellow/green- grew out ESBL.  [place] spine Dr. Boogie, ordered IR aspiration and cx. ID also consulted.     Dw extensively with pt and sister-in-law at bedside who expressed appreciation and understanding      Vital signs:  Temp:  [97.9 °F (36.6 °C)-98.7 °F (37.1 °C)] 97.9 °F (36.6 °C)  Pulse:  [85-98] 92  Resp:  [17-18] 17  BP: (128-155)/(56-89) 151/61  SpO2:  [92 %-96 %] 92 %      Physical Exam:    Gen: No acute distress, alert and oriented , no accessory m use  Pulm: Lungs clear, ok respiratory effort  CV: Heart with regular rate and rhythm, no edema  Abd: Abdomen soft, nontender, nondistended, bowel sounds present, no peritoneal signs  MSK:   no clubbing, no cyanosis  Skin: no visible rashes    Psych:   appropriate affect,   memory ok     Lab 02/07/24  1038 02/08/24  0452 02/09/24  0437   WBC 18.2* 18.4* 18.5*   HGB 11.9* 11.5* 12.3   MCV 97.7 98.1 97.0   .0* 484.0* 497.0*   INR 1.21*  --   --       Lab 02/07/24  1038 02/08/24  0452 02/09/24  0437    139 137   K 4.1 4.2 4.0    106 103   CO2 29.0 26.0 30.0   BUN 29* 21 24*   CREATSERUM 1.16* 0.97 0.88   CA 9.5 8.9 9.3   MG  --  2.3 2.4   * 91 136*             ASSESSMENT / PLAN:       73 year old female with mmp including but not limited to HTN/HL, asthma, seizure disorder, OA, MDD in partial remission is admitted for L sided abdominal pain.      **L sided abdominal pain secondary to  **loculated fluid collections around psoas muscle, possible fluid/hemorrhage resolving from recent surgery  **concern for possible infected hematoma vs other  -spine and gen surg consulted in ED, appreciate  -pt afebrile and nontoxic appearing. Hold off on abx for now  -BC, incision drainage cx with  ESBL. Pct minimally elevated.  IR consult for aspiration of hematoma with cx. Hold off abx for now until after aspiration  -ID consult, appreciate- abx per ID     **constipation as noted on CT a/p. Bowel regimen     **hypoxia- suspect splinting from pain and narcotic effect. Asymptomatic. IS as able. Monitor        **leukocytosis-suspect reactive     Stable chronic illnesses:  HTN/HL-home meds. Arb resume  Seizure disorder-not on meds  MDD in partial remission-home med     PPx-scds for now         2/8 MRI:    1. As noted on recent CT imaging, patient is status post recent lumbar fusion with complex fluid collection noted along the left psoas muscle that appears to have slightly increased in size from prior imaging and is noted to have internal fluid/fluid   level suggesting a liquefying hematoma.  This collection currently measures approximately 7.5 x 3.2 x 10.7 cm in craniocaudal, AP and transverse dimensions respectively.  There is no abnormal post-contrast enhancement of this collection.  The possibly of    an infected seroma is considered unlikely however correlation with clinical findings is recommended.   2. Smaller seroma/liquefying hematoma collection within the right psoas muscle also noted measuring 0.9 cm in maximal dimension.   3. Multiple small cystic foci along the pancreas with mild prominence of the pancreatic duct.  The possibility of IPMN cannot be excluded. Simple pancreatic cysts <2 cm in asymptomatic patients can be followed with CT or MRI in 6-12 months, depending on   clinical factors.       MEDICATIONS ADMINISTERED IN LAST 1 DAY:    cyclobenzaprine (Flexeril) tab 10 mg       Date Action Dose Route User    2/9/2024 0344 Given 10 mg Oral Rodolfo Villegas RN    2/8/2024 2043 Given 10 mg Oral Va Valera, RN          diazePAM (Valium) tab 5 mg       Date Action Dose Route User    2/9/2024 0236 Given 5 mg Oral Va Valera RN    2/8/2024 1811 Given 5 mg Oral Nancy Russo RN           HYDROcodone-acetaminophen (Norco)  MG per tab 2 tablet       Date Action Dose Route User    2/9/2024 1049 Given 2 tablet Oral Анна Cutler RN          HYDROmorphone (Dilaudid) 1 MG/ML injection 0.4 mg       Date Action Dose Route User    2/9/2024 0700 Given 0.4 mg Intravenous Va Valera RN    2/9/2024 0344 Given 0.4 mg Intravenous Rodolfo Villegas RN    2/8/2024 2043 Given 0.4 mg Intravenous Va Valera RN          HYDROmorphone (Dilaudid) 1 MG/ML injection 0.8 mg       Date Action Dose Route User    2/8/2024 2356 Given 0.8 mg Intravenous Va Valera RN          ondansetron (Zofran) 4 MG/2ML injection 4 mg       Date Action Dose Route User    2/9/2024 0326 Given 4 mg Intravenous Rodolfo Villegas RN          Vitals (last day)       Date/Time Temp Pulse Resp BP SpO2 Weight O2 Device O2 Flow Rate (L/min) [facility]    02/09/24 1200 97.9 °F (36.6 °C) 92 17 151/61 92 % -- None (Room air) 0 L/min     02/09/24 1040 98.5 °F (36.9 °C) 85 17 128/67 94 % -- Nasal cannula 3 L/min     02/09/24 0500 98.1 °F (36.7 °C) 95 18 138/69 95 % -- -- --     02/09/24 0323 -- 98 -- -- 92 % -- -- -- AF    02/09/24 0000 98.7 °F (37.1 °C) 91 18 155/89 96 % -- Nasal cannula 3 L/min     02/08/24 2000 97.9 °F (36.6 °C) 89 18 130/56 94 % -- -- -- AF    02/08/24 1700 98.4 °F (36.9 °C) -- 17 -- -- -- Nasal cannula 4 L/min     02/08/24 1132 98.7 °F (37.1 °C) 85 17 139/55 96 % -- Nasal cannula 4 L/min     02/08/24 0717 98.6 °F (37 °C) 94 17 125/57 93 % -- Nasal cannula 2 L/min GG    02/08/24 0400 99.2 °F (37.3 °C) 100 18 148/72 93 % -- -- -- AF    02/08/24 0000 98.4 °F (36.9 °C) 96 18 132/57 93 % -- -- -- AF

## 2024-02-09 NOTE — IMAGING NOTE
Pt post left lower back drain placement. Bulb suction placed per Dr. Lara draining maroon color purulent think drainage. Tolerated procedure and sedation well. Report called to Анна on 3rd floor.

## 2024-02-09 NOTE — PROGRESS NOTES
[facility] and [facility] Infectious Disease  Progress Note    Gloria Mccrary Patient Status:  Inpatient    1950 MRN [id number]   Location [facility] 3SW-A Attending ASHLEE Holland MD   Hosp Day # 2 PCP Gordo Antonio MD     Gloria Mccrary is a 73 year old female.   Chief Complaint   Patient presents with    Back Pain       HPI:      S/p mid [] lumbar fusion with old hardware removed and new hardware inserted  Went to [facility] e.r.  where pain and one mention of drainage noted;not admitted or transferred  Seen as outpt where mention of wound looking adequate and given 7 days keflex prophylaxis  Here with left flank severe pain; culture has esbl k.pneum [presume from the lateral wound]               REVIEW OF SYSTEMS:   A comprehensive 10 point review of systems was completed.  Pertinent positives and negatives noted in the the HPI.       Allergies:  Allergies   Allergen Reactions    Meloxicam CONFUSION        Current Meds:    Current Facility-Administered Medications:     fentaNYL (Sublimaze) 50 mcg/mL injection, , ,     midazolam (Versed) 2 MG/2ML injection, , ,     HYDROcodone-acetaminophen (Norco)  MG per tab 1 tablet, 1 tablet, Oral, Q6H PRN **OR** HYDROcodone-acetaminophen (Norco)  MG per tab 2 tablet, 2 tablet, Oral, Q6H PRN    [START ON 2/10/2024] multivitamin (Centrum) chewable tab (Adult) 1 tablet, 1 tablet, Oral, Daily    sodium chloride 0.9% infusion, , Intravenous, Continuous    midazolam (Versed) 2 MG/2ML injection 1 mg, 1 mg, Intravenous, Q5 Min PRN    fentaNYL (Sublimaze) 50 mcg/mL injection 50 mcg, 50 mcg, Intravenous, Q5 Min PRN    naloxone (Narcan) 0.4 MG/ML injection 80 mcg, 80 mcg, Intravenous, PRN    flumazenil (Romazicon) 0.5 mg/5mL injection 0.2 mg, 0.2 mg, Intravenous, PRN    diazePAM (Valium) tab 5 mg, 5 mg, Oral, Q8H PRN    bisacodyl (Dulcolax) 10 MG rectal suppository 10 mg, 10 mg, Rectal, Daily PRN    losartan (Cozaar) tab 100 mg, 100 mg,  Oral, Daily    sodium chloride 0.9% infusion, 125 mL/hr, Intravenous, Continuous    HYDROmorphone (Dilaudid) 1 MG/ML injection 0.2 mg, 0.2 mg, Intravenous, Q2H PRN **OR** HYDROmorphone (Dilaudid) 1 MG/ML injection 0.4 mg, 0.4 mg, Intravenous, Q2H PRN **OR** HYDROmorphone (Dilaudid) 1 MG/ML injection 0.8 mg, 0.8 mg, Intravenous, Q2H PRN    amLODIPine (Norvasc) tab 10 mg, 10 mg, Oral, Daily    atorvastatin (Lipitor) tab 40 mg, 40 mg, Oral, QAM    docusate sodium (Colace) cap 100 mg, 100 mg, Oral, BID    DULoxetine (Cymbalta) DR cap 20 mg, 20 mg, Oral, Daily    levothyroxine (Synthroid) tab 75 mcg, 75 mcg, Oral, Daily @ 0700    metoprolol succinate ER (Toprol XL) 24 hr tab 25 mg, 25 mg, Oral, Daily    sodium chloride 0.9% infusion, , Intravenous, Continuous    acetaminophen (Tylenol Extra Strength) tab 500 mg, 500 mg, Oral, Q4H PRN    polyethylene glycol (PEG 3350) (Miralax) 17 g oral packet 17 g, 17 g, Oral, Daily PRN    sennosides (Senokot) tab 17.2 mg, 17.2 mg, Oral, Nightly PRN    ondansetron (Zofran) 4 MG/2ML injection 4 mg, 4 mg, Intravenous, Q6H PRN    metoclopramide (Reglan) 5 mg/mL injection 5 mg, 5 mg, Intravenous, Q8H PRN    cyclobenzaprine (Flexeril) tab 10 mg, 10 mg, Oral, TID PRN   No current outpatient medications on file.        HISTORY:  Past Medical History:   Diagnosis Date    ASTHMA     Asthma     Back problem     Calculus of kidney     Complex partial seizure disorder (HCC)     DEPRESSION     Disorder of thyroid     High blood pressure     High cholesterol     History of blood transfusion 2005    HYPERLIPIDEMIA     HYPERTENSION     Macular degeneration     Muscle weakness     NAVEEN (obstructive sleep apnea) DMG REDX 7-6-10     AHI 17  RDI 20  REM AHI 34  YULY 76%    OSTEOARTHRITIS     Osteoarthritis     Seizure disorder (HCC)     last seizure was 06/2018    Sleep apnea     no device      Past Surgical History:   Procedure Laterality Date    BENIGN BIOPSY LEFT      15-20 years ago    CATARACT       COLONOSCOPY      COLONOSCOPY,BIOPSY  12/04/2007    Performed by MARY JO RUSH at [facility], [facility]    HYSTERECTOMY      KNEE REPLACEMENT SURGERY      Right 05/2010    KNEE REPLACEMENT SURGERY Left 2017    Total    LUMBAR SPINE FUSION,ANTER APPRCH  2005    OTHER SURGICAL HISTORY  1993    Lumbar 3-4 ingrid laminectomy. microdicectomy        Social history and family history negative related to present illness except as above.    PHYSICAL EXAM:   /61 (BP Location: Right arm)   Pulse 92   Temp 97.9 °F (36.6 °C) (Oral)   Resp 17   Ht 5' 3\" (1.6 m)   Wt 190 lb (86.2 kg)   SpO2 92%   BMI 33.66 kg/m²   GENERAL:  Awake, alert, oriented x3. Non-tox, non-septic and in NAD.  HEENT:  Normocephalic, no scleral abnormalities.  Oropharynx clear, trachea ML.  NECK:  Supple, no masses, no lymphadenopathy.  LUNGS:  Clear to auscultation b/l, no rhonchi, rales, or wheezes.  CARDIO: RRR S1/S2, no rubs, clicks, heaves, or murmurs.  GI:  Soft NT/ND, BS present x4 quadrants, no HSM.  EXTREMITIES:  No edema, no clubbing, no cyanosis.  NEURO:  No focal neurologic deficits.  DERM:  midline wound feels full but seems to be healing; more lateral wound with more necrotic [slight] breakdown and ffels full also    IMPRESSION/PLAN:   Postop complication with a potential wound infection and increasing pain; superficial culture had skin fl. And esbl k.pneum  There is hx partial seizures, but off all meds;in my opinion benefits of carbapenem outweighs risks so rx meropenem  2/7 ct showed psoas mm fluid collection; reviewed with I.R.;no clear connection to the wound[but I am suspicious]  Ct / I.R. in progress; formal I&D may or may not be needed  Will follow with you  Spoke with pt., rn and I.r. service  Thanks, #[phone]            Recent Results (from the past 72 hour(s))   Aerobic Bacterial Culture    Collection Time: 02/07/24 10:19 AM    Specimen: Wound; Other   Result Value Ref Range    Aerobic Culture Result       Mixture of  organisms suggestive of normal skin aundrea    Aerobic Culture Result       No Staph aureus, Beta Strep or Pseudo aeruginosa isolated    Aerobic Smear No WBCs seen     Aerobic Smear No organisms seen    Aerobic Bacterial Culture    Collection Time: 02/07/24 10:19 AM    Specimen: Wound; Other   Result Value Ref Range    Aerobic Culture Result       Mixture of organisms suggestive of normal skin aundrea    Aerobic Culture Result 2+ growth Klebsiella pneumoniae ESBL Pos (A)     Aerobic Smear No WBCs seen     Aerobic Smear No organisms seen        Susceptibility    Klebsiella pneumoniae ESBL Pos -  (no method available)     Cefazolin >=64 Resistant      Cefepime  Resistant      Ceftazidime  Resistant      Ceftriaxone 16 Resistant      Ciprofloxacin <=0.25 Sensitive      Gentamicin <=1 Sensitive      Meropenem <=0.25 Sensitive      Levofloxacin 1 Sensitive      Trimethoprim/Sulfa <=20 Sensitive    RAINBOW DRAW LAVENDER    Collection Time: 02/07/24 10:38 AM   Result Value Ref Range    Hold Lavender Auto Resulted    RAINBOW DRAW LIGHT GREEN    Collection Time: 02/07/24 10:38 AM   Result Value Ref Range    Hold Lt Green Auto Resulted    RAINBOW DRAW BLUE    Collection Time: 02/07/24 10:38 AM   Result Value Ref Range    Hold Blue Auto Resulted    Comp Metabolic Panel (14)    Collection Time: 02/07/24 10:38 AM   Result Value Ref Range    Glucose 106 (H) 70 - 99 mg/dL    Sodium 141 136 - 145 mmol/L    Potassium 4.1 3.5 - 5.1 mmol/L    Chloride 107 98 - 112 mmol/L    CO2 29.0 21.0 - 32.0 mmol/L    Anion Gap 5 0 - 18 mmol/L    BUN 29 (H) 9 - 23 mg/dL    Creatinine 1.16 (H) 0.55 - 1.02 mg/dL    Calcium, Total 9.5 8.5 - 10.1 mg/dL    Calculated Osmolality 298 (H) 275 - 295 mOsm/kg    eGFR-Cr 50 (L) >=60 mL/min/1.73m2    AST 15 15 - 37 U/L    ALT 26 13 - 56 U/L    Alkaline Phosphatase 255 (H) 55 - 142 U/L    Bilirubin, Total 0.4 0.1 - 2.0 mg/dL    Total Protein 7.6 6.4 - 8.2 g/dL    Albumin 2.7 (L) 3.4 - 5.0 g/dL    Globulin  4.9 (H)  2.8 - 4.4 g/dL    A/G Ratio 0.6 (L) 1.0 - 2.0   Prothrombin Time (PT)    Collection Time: 02/07/24 10:38 AM   Result Value Ref Range    PT 15.4 (H) 11.6 - 14.8 seconds    INR 1.21 (H) 0.80 - 1.20   PTT, Activated    Collection Time: 02/07/24 10:38 AM   Result Value Ref Range    PTT 31.3 23.3 - 35.6 seconds   Lactic Acid, Plasma    Collection Time: 02/07/24 10:38 AM   Result Value Ref Range    Lactic Acid 2.4 (H) 0.4 - 2.0 mmol/L   Blood Culture    Collection Time: 02/07/24 10:38 AM    Specimen: Blood,peripheral   Result Value Ref Range    Blood Culture Result No Growth 2 Days    CBC W/ DIFFERENTIAL    Collection Time: 02/07/24 10:38 AM   Result Value Ref Range    WBC 18.2 (H) 4.0 - 11.0 x10(3) uL    RBC 3.92 3.80 - 5.30 x10(6)uL    HGB 11.9 (L) 12.0 - 16.0 g/dL    HCT 38.3 35.0 - 48.0 %    .0 (H) 150.0 - 450.0 10(3)uL    MCV 97.7 80.0 - 100.0 fL    MCH 30.4 26.0 - 34.0 pg    MCHC 31.1 31.0 - 37.0 g/dL    RDW 13.2 %    Neutrophil Absolute Prelim 15.22 (H) 1.50 - 7.70 x10 (3) uL    Neutrophil Absolute 15.22 (H) 1.50 - 7.70 x10(3) uL    Lymphocyte Absolute 1.30 1.00 - 4.00 x10(3) uL    Monocyte Absolute 1.49 (H) 0.10 - 1.00 x10(3) uL    Eosinophil Absolute 0.05 0.00 - 0.70 x10(3) uL    Basophil Absolute 0.03 0.00 - 0.20 x10(3) uL    Immature Granulocyte Absolute 0.12 0.00 - 1.00 x10(3) uL    Neutrophil % 83.5 %    Lymphocyte % 7.1 %    Monocyte % 8.2 %    Eosinophil % 0.3 %    Basophil % 0.2 %    Immature Granulocyte % 0.7 %   Blood Culture    Collection Time: 02/07/24 10:47 AM    Specimen: Blood,peripheral   Result Value Ref Range    Blood Culture Result No Growth 2 Days    Lactic Acid Reflex Post Positive    Collection Time: 02/07/24  1:10 PM   Result Value Ref Range    Lactic Acid 0.8 0.4 - 2.0 mmol/L   Basic Metabolic Panel (8)    Collection Time: 02/08/24  4:52 AM   Result Value Ref Range    Glucose 91 70 - 99 mg/dL    Sodium 139 136 - 145 mmol/L    Potassium 4.2 3.5 - 5.1 mmol/L    Chloride 106 98 - 112  mmol/L    CO2 26.0 21.0 - 32.0 mmol/L    Anion Gap 7 0 - 18 mmol/L    BUN 21 9 - 23 mg/dL    Creatinine 0.97 0.55 - 1.02 mg/dL    Calcium, Total 8.9 8.5 - 10.1 mg/dL    Calculated Osmolality 291 275 - 295 mOsm/kg    eGFR-Cr 62 >=60 mL/min/1.73m2   Magnesium    Collection Time: 02/08/24  4:52 AM   Result Value Ref Range    Magnesium 2.3 1.6 - 2.6 mg/dL   CBC W/ DIFFERENTIAL    Collection Time: 02/08/24  4:52 AM   Result Value Ref Range    WBC 18.4 (H) 4.0 - 11.0 x10(3) uL    RBC 3.75 (L) 3.80 - 5.30 x10(6)uL    HGB 11.5 (L) 12.0 - 16.0 g/dL    HCT 36.8 35.0 - 48.0 %    .0 (H) 150.0 - 450.0 10(3)uL    MCV 98.1 80.0 - 100.0 fL    MCH 30.7 26.0 - 34.0 pg    MCHC 31.3 31.0 - 37.0 g/dL    RDW 13.2 %    Neutrophil Absolute Prelim 15.59 (H) 1.50 - 7.70 x10 (3) uL    Neutrophil Absolute 15.59 (H) 1.50 - 7.70 x10(3) uL    Lymphocyte Absolute 0.99 (L) 1.00 - 4.00 x10(3) uL    Monocyte Absolute 1.61 (H) 0.10 - 1.00 x10(3) uL    Eosinophil Absolute 0.03 0.00 - 0.70 x10(3) uL    Basophil Absolute 0.04 0.00 - 0.20 x10(3) uL    Immature Granulocyte Absolute 0.14 0.00 - 1.00 x10(3) uL    Neutrophil % 84.6 %    Lymphocyte % 5.4 %    Monocyte % 8.8 %    Eosinophil % 0.2 %    Basophil % 0.2 %    Immature Granulocyte % 0.8 %   Procalcitonin    Collection Time: 02/08/24  4:52 AM   Result Value Ref Range    Procalcitonin 0.17 (H) <0.16 ng/mL   CBC, Platelet; No Differential    Collection Time: 02/09/24  4:37 AM   Result Value Ref Range    WBC 18.5 (H) 4.0 - 11.0 x10(3) uL    RBC 4.03 3.80 - 5.30 x10(6)uL    HGB 12.3 12.0 - 16.0 g/dL    HCT 39.1 35.0 - 48.0 %    .0 (H) 150.0 - 450.0 10(3)uL    MCV 97.0 80.0 - 100.0 fL    MCH 30.5 26.0 - 34.0 pg    MCHC 31.5 31.0 - 37.0 g/dL    RDW 13.0 %   Basic Metabolic Panel (8)    Collection Time: 02/09/24  4:37 AM   Result Value Ref Range    Glucose 136 (H) 70 - 99 mg/dL    Sodium 137 136 - 145 mmol/L    Potassium 4.0 3.5 - 5.1 mmol/L    Chloride 103 98 - 112 mmol/L    CO2 30.0 21.0 -  32.0 mmol/L    Anion Gap 4 0 - 18 mmol/L    BUN 24 (H) 9 - 23 mg/dL    Creatinine 0.88 0.55 - 1.02 mg/dL    Calcium, Total 9.3 8.5 - 10.1 mg/dL    Calculated Osmolality 290 275 - 295 mOsm/kg    eGFR-Cr 70 >=60 mL/min/1.73m2   Magnesium    Collection Time: 02/09/24  4:37 AM   Result Value Ref Range    Magnesium 2.4 1.6 - 2.6 mg/dL   Prothrombin Time (PT)    Collection Time: 02/09/24  3:32 PM   Result Value Ref Range    PT 15.3 (H) 11.6 - 14.8 seconds    INR 1.21 (H) 0.80 - 1.20         Aneesh Perera MD     CALL [facility] AT [phone] IF QUESTIONS OR CONCERNS  THANKS

## 2024-02-09 NOTE — OPERATIVE REPORT
Operative Report  Patient Name:  Gloria Mccrary  Date: 1/18/2024  Preoperative Diagnosis: Lumbar Radiculopathy, prior lumbar fusion L3-5, adjacent segment degeneration, stenosis   Postoperative Diagnosis: Same  Primary Surgeon: Sharon Gomez MD  Assistant: Chiqui ADORNO  Procedures:   - L2-3 anterior interbody fusion via a lateral transpsoas approach    CPT 08931  - Placement of Interbody spacer     CPT 72422  - anterior instrumentation L2-3     CPT 02593   - Pedicle screw instrumentation     CPT 52552  - Posterior Spinal Fusion            CPT 23072-45  - Placement of Vibone allograft     CPT 21287   - Placement of autograft       CPT 20936  - Supervision of Flouroscopy  - Use of neuromonitoring    Anesthesia: General Endotracheal Anesthesia  Estimated Blood Loss: 25cc  Implants: Atec IdentiTi lateral LLIF, Atec lumbar posterior screws x 2, Anterior lumbar plate and screws, side to side connectors  Bone Graft: DBM allograft autograft   Specimen: None  Condition: Stable  Complications: None      Surgical Indications:  Gloria Mccrary is an 73 year old female who failed conservative treatment for the  issues listed above. The option of surgery was discussed with the patient.  Risks, benefits, and alternatives of surgery were discussed with the patient, which include but are not limited to bleeding, infection, DVT/PE, dural tears, neurologic injury, worsening of neurological status, risk of instability, need for subsequent surgery, risks associated with anesthesia, including death, which were all reviewed with the patient and she consented to proceed with surgery.     Surgical Procedure:   After careful identification the patient patient was brought to the operating room laid supine.  Patient was induced under general anesthesia endotracheal tube was placed.  Patient was flipped prone onto the Justin frame.  Hips and knees extended. All bony prominences were carefully padded, the back and flank was  sterilely prepped and draped in standard fashion.                  Skin markings were made on the lateral and AP fluoroscopic image for the lateral incision and the posteriorly for the disk spaces. A 2 inch skin incision was made on the left. Electrocautery was used to incise through the subcutaneous fat. Obliques were dissected using blunt finger dissection. Transversalis fascia was penetrated using a tissue dilator and finger dissection was used to sweep the retroperitoneal fat and feel the psoas and transverse process of the segmental level.     Under the aid of free running EMG and radiographic visualization the tissue dilator was placed through the psoas targeted at the posterior 1/3 of the L2-3 intervertebral disc. Kwire was placed and checked on fluoro image. Sequential dilators were placed followed by the final retractor. Combination of intradiscal daksha placement and retractor maneuvering was utilized to obtain a clear corridor for discectomy. Complete discectomy was conducted using a combination of Escalante elevators, curettes and pituitary rongeurs. Trial sizers were placed and appropriate interbody fusion device selected.This was filled with graft material and tamped into position under fluroscopically guided final position.     Next, attention was turned to the anterior instrumentation, hemostasis was achieved an appropriate sized separate plate that is not integral to the cage was selected and applied.  Screws were placed through the plate, and a radiograph obtained to confirm position of the graft and evaluate plate fixation     Area was inspected for bleeding and slow removal of retractor was used to inspect the area. Lateral incision was closed using #1 vicryl for the deep subcutaneous tissue, 2-0 vicryl for subQ tissue and 3-0 monocril for the skin. Skin was dressed with dermabond, 4x4 and ioband. Attention was redirected toward posterior pedicle screws.    AP and lateral flouroscopic images were taken  throughout the procedure to aid in instrumentation placement. Perfect APs werer used to pedro out the lateral border of the pedicles bilaterally at the proposed treated levels. Skin incision was made 1cm lateral to this marks. Electocautery was used to dissect through the subcutaneous tissue and the fascia. Blunt finger dissection was used to develop the plane between the multifidus and remaining paraspinal muscles until the facet joint, pars and transverse process was palpated. Pedicle screws were placed using standard fluoroscopically guided pedicle screw technique.  Jamshidis' were placed at the lateral border of the pedicle and inserted to a depth of 25-30mm using AP flouro imaging. Kwires were then inserted. This was completed for all the remaining pedicles bilaterally. Lateral flouro image was taken to ensure appropriate wire trajectory. Pedicles screws of appropriate diameter and length (as assessed using pre operative imaging) were then placed at  L2 and positioned confirmed with AP/Lat flouro images and EMG probing of screw heads. All screws simulated >20mAMPs. A blum elevator was inserted over the joint at L2-3, this was challenging given the prior hardware from the L3-L5 fusion, and required additional time, energy, and focus to adequately decorticate the L2-3 facet and Transverse process, hence 22 modifer was used.  Bovie was used to remove the facet capsule proximal to the screw head. The facet joint was burred down and bone graft in the area was maintained to promote posterior facet joint fusion at L2-3.       Side to side connectors were placed at the prior hardware between L3-4, and 2 off set rods were selected and  tulip heads. The caps were then tightened to the screw 's predetermined specification utilizing a torque . The construct was the evaluated fluoroscopically and determined to be appropriate.                     100mg of vancomycin powder was placed in the wound.   Closure  was done in layers with interrupted 0 Vicryl for the fascia, 2-0 Vicryl for the subcutaneous tissue.  The subcutaneous layer was injected with 0.375% Marcaine and the skin was closed with a Monocryl suture.  Dermabond and a sterile dressing were then applied.  The patient was woken from anesthesia and transferred to the PACU in stable condition.                  A physician assistant was necessary during the case to provide positioning, exposure, hemostasis, safety and retraction and to manage wound suction so that I could operate with two hands    Sharon Gomez MD   Minimally Invasive and Complex Spine Surgery SpecialistWyandot Memorial Hospital

## 2024-02-09 NOTE — DIETARY NOTE
[facility]    NUTRITION ASSESSMENT    Unable to diagnose malnutrition criteria at this time.    NUTRITION INTERVENTION:    Meal and Snacks - Monitor and encourage adequate PO intake.   Medical Food Supplements -  Ensure Plus High Protein at lunch and Magic Cup at dinner .   Vitamin and Mineral Supplements - added Chewable MVI    PATIENT STATUS:     2/9/24- 72 y/o female admitted with acute abdominal pain. Pt seen due to nutrition consult for at risk and MST score of 2. Pt falling in and out of sleep during visit. Unable to answer questions about appetite/PO intake/or wt hx. Per RN report pt with poor appetite. Consumed ~25% of omelette and 75% of muffin for breakfast. Will add ONS to help maximize nutrition intakes. Pt noted to have retroperitoneal fluid collection. Surgery and Orthopedics following. S/p abdominal MRI. Pt's symptoms do not appear to be coming from the spine or an infection per Orthopedic note (2/8). Will continue to monitor.   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       ANTHROPOMETRICS:  Ht: 160 cm (5' 3\")  Wt: 86.2 kg (190 lb).   BMI: Body mass index is 33.66 kg/m².  IBW: 52.3 kg      WEIGHT HISTORY:     Per EMR hx, pt may have lost about 22 lb (10.4%) in about 3 weeks? Pt unable to provide wt hx at this time.    Noted wt range of 190 lb-212 lb over the past 4 years.    Wt Readings from Last 10 Encounters:   02/07/24 86.2 kg (190 lb)   01/18/24 96.2 kg (212 lb)   02/17/23 93 kg (205 lb)   01/21/22 93.6 kg (206 lb 6.4 oz)   12/20/21 88.5 kg (195 lb)   11/19/21 95.3 kg (210 lb)   07/26/21 94.3 kg (208 lb)   12/16/20 96.1 kg (211 lb 12.8 oz)   11/18/20 88.9 kg (196 lb)   11/04/20 90.7 kg (200 lb)        NUTRITION:  Diet:       Procedures    Regular/General diet Is Patient on Accuchecks? No      Food Allergies: No  Cultural/Ethnic/Yarsani Preferences Addressed:  Yes    Percent Meals Eaten (last 3 days)       Date/Time Percent Meals Eaten (%)    02/08/24 0830 100 %    02/09/24 1040 50 %            GI system review: abdominal pain Last BM: 2/3  Skin and wounds: surgical back incisions, s/p lumbar surgery on 1/18/24. No pressure ulcers per RN documentation.    NUTRITION RELATED PHYSICAL FINDINGS:     1. Body Fat/Muscle Mass: no wasting noted     2. Fluid Accumulation: lower extremity edema     NUTRITION PRESCRIPTION: 52.3 kg (IBW)  Calories: 9213-1468 calories/day (30-35 kcal/kg)  Protein:  grams protein/day (1.5-2.0 grams protein per kg)  Fluid: ~1 ml/kcal or per MD discretion    NUTRITION DIAGNOSIS/PROBLEM:  Inadequate energy intake related to physiological causes as evidenced by documented/reported insufficient oral intake      MONITOR AND EVALUATE/NUTRITION GOALS:  PO intake of 75% of meals TID - New  PO intake of 75% of oral nutrition supplement/s - New      MEDICATIONS:  Dilaudid, Miralax, Colace, Zofran    LABS:  Glu:[id number]    Pt is at Moderate nutrition risk    Carissa Schwartz MS, RD, LDN  Clinical Dietitian  Ext:[phone]

## 2024-02-09 NOTE — PROCEDURES
[facility]  Procedure Note    Gloria Mccrary Patient Status:  Inpatient    1950 MRN [id number]   Location [facility] 3SW-A Attending ASHLEE Holland MD   Hosp Day # 2 PCP Gordo Antonio MD     Procedure: CT guided retroperitoneal abscess drainage    Pre-Procedure Diagnosis:  left psoas abscess    Post-Procedure Diagnosis: same    Anesthesia:  Local and Sedation    Findings:  pus    Specimens: 53 ml    Blood Loss:  <10 ml    Tourniquet Time: n/a  Complications:  None  Drains:  10.2 Fr drain left posterolateral back    Secondary Diagnosis:  none    Skyler Lara MD  2024

## 2024-02-09 NOTE — PROGRESS NOTES
[facility] Progress Note     Gloria MORGAN Mccrary Patient Status:  Inpatient    1950 MRN [id number]   [facility] 3SW-A Attending ASHLEE Holland MD   Hosp Day # 2 PCP Gordo Antonio MD     Chief Complaint: f/u L abdominal pain    Subjective:   S:  laying in bed, still with exquisite pain with movement/standing--incision site w/ staples draining yellow/green- grew out ESBL.  Dw spine Dr. Boogie, ordered IR aspiration and cx. ID also consulted.    Dw extensively with pt and sister-in-law at bedside who expressed appreciation and understanding    Objective:   Vital signs:  Temp:  [97.9 °F (36.6 °C)-98.7 °F (37.1 °C)] 97.9 °F (36.6 °C)  Pulse:  [85-98] 92  Resp:  [17-18] 17  BP: (128-155)/(56-89) 151/61  SpO2:  [92 %-96 %] 92 %    Wt Readings from Last 6 Encounters:   24 190 lb (86.2 kg)   24 212 lb (96.2 kg)   23 205 lb (93 kg)   22 206 lb 6.4 oz (93.6 kg)   21 195 lb (88.5 kg)   21 210 lb (95.3 kg)         Physical Exam:    Gen: No acute distress, alert and oriented , no accessory m use  Pulm: Lungs clear, ok respiratory effort  CV: Heart with regular rate and rhythm, no edema  Abd: Abdomen soft, nontender, nondistended, bowel sounds present, no peritoneal signs  MSK:   no clubbing, no cyanosis  Skin: no visible rashes    Psych:   appropriate affect,   memory ok    Results:   Diagnostic Data:      Labs:       Recent Labs   Lab 24  1038 24  04524  043   WBC 18.2* 18.4* 18.5*   HGB 11.9* 11.5* 12.3   MCV 97.7 98.1 97.0   .0* 484.0* 497.0*   INR 1.21*  --   --        Recent Labs   Lab 24  1038 24  04524  0437    139 137   K 4.1 4.2 4.0    106 103   CO2 29.0 26.0 30.0   BUN 29* 21 24*   CREATSERUM 1.16* 0.97 0.88   CA 9.5 8.9 9.3   MG  --  2.3 2.4   * 91 136*       Recent Labs   Lab 24  1038   ALT 26   AST 15   ALB 2.7*        COVID-19  Lab Results   Component Value Date     COVID19 Not Detected 02/17/2023    COVID19 NOT DETECTED 11/14/2020    COVID19 NOT DETECTED 10/31/2020       Medications:    [START ON 2/10/2024] multivitamin  1 tablet Oral Daily    losartan  100 mg Oral Daily    amLODIPine  10 mg Oral Daily    atorvastatin  40 mg Oral QAM    docusate sodium  100 mg Oral BID    DULoxetine  20 mg Oral Daily    levothyroxine  75 mcg Oral Daily @ 0700    metoprolol succinate ER  25 mg Oral Daily      sodium chloride 125 mL/hr (02/07/24 1432)    sodium chloride 75 mL/hr at 02/07/24 1750     HYDROcodone-acetaminophen **OR** HYDROcodone-acetaminophen, diazePAM, bisacodyl, HYDROmorphone **OR** HYDROmorphone **OR** HYDROmorphone, acetaminophen, polyethylene glycol (PEG 3350), sennosides, ondansetron, metoclopramide, cyclobenzaprine          ASSESSMENT / PLAN:      73 year old female with mmp including but not limited to HTN/HL, asthma, seizure disorder, OA, MDD in partial remission is admitted for L sided abdominal pain.      **L sided abdominal pain secondary to  **loculated fluid collections around psoas muscle, possible fluid/hemorrhage resolving from recent surgery  **concern for possible infected hematoma vs other  -spine and gen surg consulted in ED, appreciate  -pt afebrile and nontoxic appearing. Hold off on abx for now  -BC, incision drainage cx with ESBL. Pct minimally elevated.  IR consult for aspiration of hematoma with cx. Hold off abx for now until after aspiration  -ID consult, appreciate- abx per ID     **constipation as noted on CT a/p. Bowel regimen    **hypoxia- suspect splinting from pain and narcotic effect. Asymptomatic. IS as able. Monitor       **leukocytosis-suspect reactive     Stable chronic illnesses:  HTN/HL-home meds. Arb resume  Seizure disorder-not on meds  MDD in partial remission-home med     PPx-scds for now    Dw pt and RN     Thank You,  Winsome Hutton MD    [facility]  Internal Medicine  Answering Service number:  [phone]    Supplementary Documentation:

## 2024-02-09 NOTE — PLAN OF CARE
Pt Aox4, drowsy but easily arousable. IV SL.  IS. O2 2L, unable to wean to RA, Hospialist aware. SCD on. 1-2 assist to sit/turn in bed. Incision sites x3 staples intact. Mod amount of clear yellow/green drainage from incisions. Redness noted to all 3 incisions. Miralax and colace given pt declines further laxatives/suppositories. Tolerating diet, denies nausea. PT/OT pending.    To IR for aspiration of abscess, MANOJ drain placed to bulb suction. Dark red purulent drainage noted. Drowsy post procedure, slightly disoriented to place and time, easily reoriented. IV Merrem started.

## 2024-02-09 NOTE — PLAN OF CARE
Patient A&O X4 on 3-4L O2. VSS, /IS/telemetry- NSR. SCDs encouraged. Voiding freely via purewick, LBM 2/3- declined suppository. Seizure precautions maintained. Previous incision to lower back covered with coverlet x3, d/i. Denies n/t. Pain controlled with PO/IV medication. Bedrest d/t pain. Reminded to use call light.     0616: Received call from micro about wound culture showing ESBL. On call hospitalist made aware. No new orders.

## 2024-02-10 PROCEDURE — 97162 PT EVAL MOD COMPLEX 30 MIN: CPT

## 2024-02-10 PROCEDURE — 97530 THERAPEUTIC ACTIVITIES: CPT

## 2024-02-10 RX ORDER — ENOXAPARIN SODIUM 100 MG/ML
40 INJECTION SUBCUTANEOUS DAILY
Status: DISCONTINUED | OUTPATIENT
Start: 2024-02-10 | End: 2024-02-10

## 2024-02-10 RX ORDER — KETOROLAC TROMETHAMINE 15 MG/ML
15 INJECTION, SOLUTION INTRAMUSCULAR; INTRAVENOUS EVERY 6 HOURS PRN
Status: DISPENSED | OUTPATIENT
Start: 2024-02-10 | End: 2024-02-12

## 2024-02-10 NOTE — PROGRESS NOTES
[facility]  Progress Note      Gloria Mccrary Patient Status:  Inpatient    1950 MRN [id number]   Location [facility] 3SW-A Attending ASHLEE Holland MD   Hosp Day # 3 PCP Gordo Antonio MD       73 year-old femaie with left psoas abscess s/p drain placement    - Drain functioning well with elevated outputs  - Continue current drain management  - CT Abscessogram when output < 10 cc/day  - Consider drain removal if output is < 10 cc/day x 2 days and no fistulous communication.    Rubén Lester MD  2/10/2024  10:15 AM

## 2024-02-10 NOTE — CONSULTS
[facility]    PATIENT'S NAME: KEVIN DODSON   ATTENDING PHYSICIAN: ASHLEE Holland M.D.   CONSULTING PHYSICIAN: Aneesh Perera M.D.   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  She had previous lumbar surgery on January 18, underwent a lumbar fusion with old hardware removed and new hardware inserted.  One week later, she went to the [facility] Room on 01/25 where she was having pain.  There was one mention of some foul-smelling drainage.  She was not admitted or transferred.  She was then seen as an outpatient where mention of the wound looking adequate, and she was given 7 days of Keflex as prophylaxis.  She is now here with what was described as foul-smelling drainage and back pain.  Her pain is actually more lateral in left flank.  There is a culture which I am not sure if it came from the wound, but I think it did and it is growing an ESBL Klebsiella.  Currently, she is being seen in the IR suite where she has been placed on her stomach for IR drainage of a psoas area fluid collection which has been seen on the CT and MRI.  I can elicit no other GI, , cardiovascular, CNS, or respiratory symptoms at this time.    PAST MEDICAL HISTORY:  She is overweight.  There is asthma, kidney stone, complex partial seizure disorder many many years ago.  She does not take any medications for this.  Thyroid issues, blood pressure, cholesterol, lipids, macular degeneration, sleep apnea, DJD, last seizure 2018.    PAST SURGICAL HISTORY:  Hysterectomy, right and left knees have been replaced, lumbar fusion anterior approach 2005, and L3-4 hemilaminectomy 1993.    MEDICATIONS:  Currently off antibiotics.  She was  admitted here on the 7th.      ALLERGIES:  Meloxicam.    SOCIAL HISTORY:  Negative cigarettes and alcohol.    FAMILY HISTORY:  Unrevealing.    REVIEW OF SYSTEMS:  Overweight.      PHYSICAL EXAMINATION:    GENERAL:  This is a well-developed woman, pale, but in good spirits, though in much back and left flank area pain.    VITAL SIGNS:  She is afebrile.  Other vital signs stable.  HEENT:  Pale conjunctiva.  NECK:  Supple.  LUNGS:  Clear.  HEART:  Not heard by me.  ABDOMEN:  Not examined by me.  EXTREMITIES:  No phlebitic areas are appreciated.    BACK:  Examined.  The midline wound has some induration and fullness.  Minimal amount of discoloration without drainage.  The left lateral wound has staples in place.  The margins look redder, and there is some involving necrotic tissue at the edges.  This area is also full.  Where her pain is laterally, there is no lesion noted.    NEUROLOGIC:  Not further tested by me.    LABORATORY DATA:  White count 18.5, hemoglobin 12.3, platelets 497.  INR is 1.21.  BUN and creatinine of 24 and 0.88.  Blood cultures are negative and aerobic bacterial culture has a skin aundrea and an ESBL Klebsiella.      The CT scan was reviewed with IR Service.  The lateral wound is felt to be the stapled area and the fluid collection in the psoas area on the left is not formally seen to communicate with this lateral area.    IMPRESSION:    1.   A postoperative complication with a potential evolving wound infection.  There is increasing pain separate from the wound itself.  A superficial culture presumably had skin aundrea and an ESBL Klebsiella.  The 02/07 CT showed psoas muscle fluid collection.  This was reviewed with the IR Service.  No clear connection to the wound is noted, although I am suspicious.  I am in agreement with draining of this area which is being done at the time of this dictation.  A formal incision and drainage by the Surgical Service may or may not be needed.  2.   There is a history  of a partial seizure.  She has been off all medications.  In my opinion, the benefits of giving a carbapenem outweigh the risks, so meropenem will be initiated.  One of the newer cephalosporins might also be a consideration if we run into trouble with the carbapenem.  I have spoken with the patient, nurse and IR Service including about the antibiotic.  Further suggestions to follow.    Thank you very much for allowing me to see this patient.     Dictated By Aneesh Perera M.D.  d: 02/09/2024 17:04:38  t: 02/09/2024 17:14:09  Job [id number]  [facility]/

## 2024-02-10 NOTE — PLAN OF CARE
Pt a/ox3-4, disoriented to time and occasionally situation over night. Reoriented easily, bed alarm set, frequent rounding on patient. Minimizing narcotics as able to lessen confusion.   O2 2-3L per NC to keep sats >90%, encouraging IS use.   MANOJ drain to LL back, q8hr saline flush per IR orders. Dressing to incision sites changed, serous/purulent drainage from central incision, R incision dry, left incision with scant serous drainage as well.   Pt not tolerating HOB >30-45 degrees.   Continuing IV merrem per ID orders.   Plan for PT/OT eval, spine surgery to see.

## 2024-02-10 NOTE — PROGRESS NOTES
Infectious Disease Progress Note      Date of admission: 2/7/2024  9:46 AM     Reason for consult: Retroperitoneal abscess    Subjective: Doing well.  Pain under control.  No nausea or vomiting.  No diarrhea.  No shortness of breath.  No cough or sputum production.    The rest of the systems were reviewed and found to be negative except was mentioned above    Medications:    HYDROcodone-acetaminophen **OR** HYDROcodone-acetaminophen    multivitamin    meropenem    diazePAM    bisacodyl    losartan    sodium chloride    HYDROmorphone **OR** HYDROmorphone **OR** HYDROmorphone    amLODIPine    atorvastatin    docusate sodium    DULoxetine    levothyroxine    metoprolol succinate ER    sodium chloride    acetaminophen    polyethylene glycol (PEG 3350)    sennosides    ondansetron    metoclopramide    cyclobenzaprine     Allergies:  Allergies   Allergen Reactions    Meloxicam CONFUSION       Physical Exam:  Vitals:    02/10/24 0350   BP: 132/71   Pulse: 93   Resp: 15   Temp: 98.9 °F (37.2 °C)     Vitals signs and nursing note reviewed.   Constitutional:       Appearance: Normal appearance.   HENT:      Head: Normocephalic and atraumatic.      Mouth: Mucous membranes are moist.   Neck:      Musculoskeletal: Neck supple.   Cardiovascular:      Rate and Rhythm: Normal rate.   Pulmonary:      Effort: Pulmonary effort is normal. No respiratory distress.   Abdominal:      General: Abdomen is flat. There is no distension.  Drain noted with small amount of purulent drainage     Palpations: Abdomen is soft. There is no mass.      Tenderness: There is mild lower abdominal tenderness. There is no guarding or rebound.      Hernia: No hernia is present.   Musculoskeletal:      Right lower leg: No edema.      Left lower leg: No edema.   Skin:     General: Skin is warm and dry.   Neurological:      General: No focal deficit present.      Mental Status: Alert and oriented to person, place, and time.       Laboratory data:  I have  reviewed all the lab results independently.  Lab Results   Component Value Date    INR 1.21 02/09/2024      Recent Labs   Lab 02/08/24  0452 02/09/24  0437   RBC 3.75* 4.03   HGB 11.5* 12.3   HCT 36.8 39.1   MCV 98.1 97.0   MCH 30.7 30.5   MCHC 31.3 31.5   RDW 13.2 13.0   NEPRELIM 15.59*  --    WBC 18.4* 18.5*   .0* 497.0*      Microbiology data:  Hospital Encounter on 02/07/24   1. Blood Culture     Status: None (Preliminary result)    Collection Time: 02/07/24 10:47 AM    Specimen: Blood,peripheral   Result Value Ref Range    Blood Culture Result No Growth 2 Days N/A   2. Aerobic Bacterial Culture     Status: Abnormal    Collection Time: 02/07/24 10:19 AM    Specimen: Wound; Other   Result Value Ref Range    Aerobic Culture Result  N/A     Mixture of organisms suggestive of normal skin aundrea    Aerobic Culture Result 2+ growth Klebsiella pneumoniae ESBL Pos (A) N/A    Aerobic Smear No WBCs seen N/A    Aerobic Smear No organisms seen N/A       Susceptibility    Klebsiella pneumoniae ESBL Pos -  (no method available)     Cefazolin >=64 Resistant      Cefepime  Resistant      Ceftazidime  Resistant      Ceftriaxone 16 Resistant      Ciprofloxacin <=0.25 Sensitive      Gentamicin <=1 Sensitive      Meropenem <=0.25 Sensitive      Levofloxacin 1 Sensitive      Trimethoprim/Sulfa <=20 Sensitive       Impression:  Gloria Mccrary is a 73 year old female with    Retroperitoneal/left psoas abscess/infected hematoma  This is in the setting of recent left L2-L3 lateral interbody fusion and PSF of L2-L3 with SHYANNE of L3-L5 and reinstrumentation back on 1/18/2024  It is difficult to discern whether these abscesses are related to the above directly or not.  It could be an infected hematoma  Now status post IR drainage on 2/9/2024 with pus noted  Cultures pending  However, wound cultures with ESBL producing Klebsiella pneumoniae  Currently on meropenem  History of seizure disorders  Will monitor closely  Has not had a  seizure in many years    Recommendations:    Continue IV meropenem, risk of seizure is lowest compared to other carbapenems  I discussed with the patient the risk of her developing a seizure on meropenem.  Her seizures were partial complex seizures.  She develops an aura that she recognizes distinctively.  She knows to call us right away if she develops that aura.  Continue to follow-up on cultures from IR drainage  Continue to monitor daily labs for antibiotic toxicities  Further recommendations will depend on the above work-up and clinical progress     The plan of care was discussed with the primary hospital team, ASHLEE Holland MD     Recommendations were also discussed with the patient; all questions were answered.     Thank you for this consultation. Please don't hesitate to call the ID team for questions or any acute changes in patient's clinical condition.    Please note that this report has been produced using speech recognition software and may contain errors related to that system including, but not limited to, errors in grammar, punctuation, and spelling, as well as words and phrases that possibly may have been recognized inappropriately.  If there are any questions or concerns, contact the dictating provider for clarification.    The  Cent[employer] Cures Act makes medical notes like these available to patients in the interest of transparency. Please be advised this is a medical document. Medical documents are intended to carry relevant information, facts as evident, and the clinical opinion of the practitioner. The medical note is intended as peer to peer communication and may appear blunt or direct. It is written in medical language and may contain abbreviations or verbiage that are unfamiliar.     Ayanna Valdez MD  [facility]. Tel: [phone]. Fax: [phone].     Gloria Mccrary : 1950 MRN: [id number] [facility]: [id number]

## 2024-02-10 NOTE — PROGRESS NOTES
Discussed patient care with Dr. Gomez. At this time we will defer to infectious disease and general surgery. No further recommendations from ortho spine at this time.

## 2024-02-10 NOTE — PROGRESS NOTES
[facility] Progress Note     Gloria MORGAN Kellyia Patient Status:  Inpatient    1950 MRN [id number]   [facility] 3SW-A Attending ASHLEE Holland MD   Hosp Day # 3 PCP Gordo Antonio MD     Chief Complaint: f/u L abdominal pain    Subjective:   S:  went to IR yesterday for drainage of abscess to psoas  ->53 ml of pus. Now on meropenem. Pt feels ok today but wants a sponge bath   Required IV dilaudid approx 0200  Objective:   Vital signs:  Temp:  [97.9 °F (36.6 °C)-98.9 °F (37.2 °C)] 98.7 °F (37.1 °C)  Pulse:  [80-93] 91  Resp:  [12-21] 16  BP: ()/(50-76) 139/57  SpO2:  [88 %-98 %] 94 %    Wt Readings from Last 6 Encounters:   24 190 lb (86.2 kg)   24 212 lb (96.2 kg)   23 205 lb (93 kg)   22 206 lb 6.4 oz (93.6 kg)   21 195 lb (88.5 kg)   21 210 lb (95.3 kg)         Physical Exam:    Gen: No acute distress, alert and oriented , no accessory m use  Pulm: Lungs clear, ok respiratory effort  CV: Heart with regular rate and rhythm, no edema  Abd: Abdomen soft, nontender, nondistended, bowel sounds present, no peritoneal signs  MSK:   no clubbing, no cyanosis  Skin: no visible rashes    Psych:   appropriate affect,   memory ok    Results:   Diagnostic Data:      Labs:       Recent Labs   Lab 24  1038 24  0452 24  0437 24  1532   WBC 18.2* 18.4* 18.5*  --    HGB 11.9* 11.5* 12.3  --    MCV 97.7 98.1 97.0  --    .0* 484.0* 497.0*  --    INR 1.21*  --   --  1.21*       Recent Labs   Lab 24  1038 24  0452 02/09/24  0437    139 137   K 4.1 4.2 4.0    106 103   CO2 29.0 26.0 30.0   BUN 29* 21 24*   CREATSERUM 1.16* 0.97 0.88   CA 9.5 8.9 9.3   MG  --  2.3 2.4   * 91 136*       Recent Labs   Lab 24  1038   ALT 26   AST 15   ALB 2.7*        COVID-19  Lab Results   Component Value Date    COVID19 Not Detected 2023    COVID19 NOT DETECTED 2020    COVID19 NOT DETECTED  10/31/2020       Medications:    multivitamin  1 tablet Oral Daily    meropenem  500 mg Intravenous Q8H    losartan  100 mg Oral Daily    amLODIPine  10 mg Oral Daily    atorvastatin  40 mg Oral QAM    docusate sodium  100 mg Oral BID    DULoxetine  20 mg Oral Daily    levothyroxine  75 mcg Oral Daily @ 0700    metoprolol succinate ER  25 mg Oral Daily      sodium chloride 125 mL/hr (02/07/24 1432)    sodium chloride 75 mL/hr at 02/07/24 1750     HYDROcodone-acetaminophen **OR** HYDROcodone-acetaminophen, diazePAM, bisacodyl, HYDROmorphone **OR** HYDROmorphone **OR** HYDROmorphone, acetaminophen, polyethylene glycol (PEG 3350), sennosides, ondansetron, metoclopramide, cyclobenzaprine          ASSESSMENT / PLAN:      73 year old female with mmp including but not limited to HTN/HL, asthma, seizure disorder, OA, MDD in partial remission is admitted for L sided abdominal pain.      **L sided abdominal pain secondary to  **loculated fluid collections around psoas muscle, abscess sp IR drainage of 53 ml of pus  -ID, spine on, appreciate. Seen by gen surg, appreciate  -BC, incision drainage cx with ESBL. Pct minimally elevated.     -ID consult, appreciate-on meropenem     **constipation as noted on CT a/p. Bowel regimen    **hypoxia- suspect splinting from pain and narcotic effect. Asymptomatic. IS as able. Monitor     **leukocytosis-suspect reactive     Stable chronic illnesses:  HTN/HL-home meds. Arb resume  Seizure disorder-not on meds  MDD in partial remission-home med     PPx-scds for now  Hematomas on imaging too    Dw pt and RN Sara     Thank You,  Winsome Hutton MD    [facility]  Internal Medicine  Answering Service number: [phone]    Supplementary Documentation:

## 2024-02-10 NOTE — PHYSICAL THERAPY NOTE
Chart reviewed, attempted to see pt this am, however pt just received pain meds. Will attempt to see pt later and as pt is appropriate. CM   done

## 2024-02-10 NOTE — PROGRESS NOTES
[facility]    Progress Note    Gloria Mccrary Patient Status:  Inpatient    1950 MRN [id number]   Location [facility] 3SW-A Attending Winsome Hutton, *   Hosp Day # 3 PCP Gordo Antonio MD       S: moderate back pain and severe abdominal pain. Patient denies any radicular symptoms. She had her aspiration yesterday and left lateral drain in place. Patient appears groggy on exam but is able to answer questions. At one time thought her  was in the room. She has a history of a L2-L3 XLIF, L2-L5 fusion on 24 and was admitted 3 days ago for abdominal pain.     Inspection:  Awake alert No acute distress. No difficulty breathing     Blood pressure 139/57, pulse 91, temperature 98.7 °F (37.1 °C), temperature source Oral, resp. rate 16, height 5' 3\" (1.6 m), weight 190 lb (86.2 kg), SpO2 94%, not currently breastfeeding.    Recent Labs   Lab 24  0452 24  0437   RBC 3.75* 4.03   HGB 11.5* 12.3   HCT 36.8 39.1   MCV 98.1 97.0   MCH 30.7 30.5   MCHC 31.3 31.5   RDW 13.2 13.0   NEPRELIM 15.59*  --    WBC 18.4* 18.5*   .0* 497.0*       Lumbar/Sacral Integument: Incision/ incisions;  Dressings in place and dry    Strength: Strength of bilateral lower extremities:     Left Right    EHL 5/5 5/5    DF 5/5 5/5    PF 5/5 5/5    Quads 5/5 5/5    IP Unable to test due to abdominal pain but able to bend both knees  Sensation: No sensory deficits noted on bilateral lower extremities        A/P: Abdominal pain, history of lumbar fusion    P: patient drain in place and awaiting cultures. No radicular symptoms. Made Dr. Gomez aware of patient status and awaiting any further recommendations from a spine standpoint.     Kaylyn Garnett PA-C   02/10/24

## 2024-02-10 NOTE — PLAN OF CARE
Pt A&Ox4, poor attention/concentration. VSS on 3L O2 NC. Telemetry monitoring, NSR. SCD's. Coverlet dressing x3 intact and dry. MANOJ drain to bulb suction, flushed with 10cc normal saline. IV abx. Pain controlled on prn medication, see MAR. Plan for PT/OT evals, spine sx to see. Home with HH once medically cleared. Call light within reach. Will continue to monitor.      1850: Ortho PA paged regarding staples in place to incision sites, will remain in place for now. MD aware.

## 2024-02-11 LAB
ANION GAP SERPL CALC-SCNC: 4 MMOL/L (ref 0–18)
ARTERIAL PATENCY WRIST A: POSITIVE
BASE EXCESS BLDA CALC-SCNC: 5.5 MMOL/L (ref ?–2)
BODY TEMPERATURE: 98.6 F
BUN BLD-MCNC: 26 MG/DL (ref 9–23)
CALCIUM BLD-MCNC: 9 MG/DL (ref 8.5–10.1)
CHLORIDE SERPL-SCNC: 106 MMOL/L (ref 98–112)
CO2 SERPL-SCNC: 29 MMOL/L (ref 21–32)
COHGB MFR BLD: 1 % SAT (ref 0–3)
CREAT BLD-MCNC: 0.73 MG/DL
EGFRCR SERPLBLD CKD-EPI 2021: 87 ML/MIN/1.73M2 (ref 60–?)
ERYTHROCYTE [DISTWIDTH] IN BLOOD BY AUTOMATED COUNT: 12.8 %
GLUCOSE BLD-MCNC: 104 MG/DL (ref 70–99)
HCO3 BLDA-SCNC: 29.2 MEQ/L (ref 21–27)
HCT VFR BLD AUTO: 35.8 %
HGB BLD-MCNC: 11 G/DL
HGB BLD-MCNC: 11.1 G/DL
L/M: 2 L/MIN
MCH RBC QN AUTO: 31 PG (ref 26–34)
MCHC RBC AUTO-ENTMCNC: 30.7 G/DL (ref 31–37)
MCV RBC AUTO: 100.8 FL
METHGB MFR BLD: 0 % SAT (ref 0.4–1.5)
OSMOLALITY SERPL CALC.SUM OF ELEC: 293 MOSM/KG (ref 275–295)
OXYHGB MFR BLDA: 96.6 % (ref 92–100)
PCO2 BLDA: 49 MM HG (ref 35–45)
PH BLDA: 7.41 [PH] (ref 7.35–7.45)
PLATELET # BLD AUTO: 399 10(3)UL (ref 150–450)
PO2 BLDA: 91 MM HG (ref 80–100)
POTASSIUM SERPL-SCNC: 3.8 MMOL/L (ref 3.5–5.1)
RBC # BLD AUTO: 3.55 X10(6)UL
SODIUM SERPL-SCNC: 139 MMOL/L (ref 136–145)
WBC # BLD AUTO: 11.2 X10(3) UL (ref 4–11)

## 2024-02-11 PROCEDURE — 82375 ASSAY CARBOXYHB QUANT: CPT | Performed by: HOSPITALIST

## 2024-02-11 PROCEDURE — 85018 HEMOGLOBIN: CPT | Performed by: HOSPITALIST

## 2024-02-11 PROCEDURE — 82803 BLOOD GASES ANY COMBINATION: CPT | Performed by: HOSPITALIST

## 2024-02-11 PROCEDURE — 85027 COMPLETE CBC AUTOMATED: CPT | Performed by: HOSPITALIST

## 2024-02-11 PROCEDURE — 97530 THERAPEUTIC ACTIVITIES: CPT

## 2024-02-11 PROCEDURE — 80048 BASIC METABOLIC PNL TOTAL CA: CPT | Performed by: HOSPITALIST

## 2024-02-11 PROCEDURE — 36600 WITHDRAWAL OF ARTERIAL BLOOD: CPT | Performed by: HOSPITALIST

## 2024-02-11 PROCEDURE — 97116 GAIT TRAINING THERAPY: CPT

## 2024-02-11 PROCEDURE — 83050 HGB METHEMOGLOBIN QUAN: CPT | Performed by: HOSPITALIST

## 2024-02-11 RX ORDER — POTASSIUM CHLORIDE 20 MEQ/1
40 TABLET, EXTENDED RELEASE ORAL ONCE
Status: DISCONTINUED | OUTPATIENT
Start: 2024-02-11 | End: 2024-02-16 | Stop reason: ALTCHOICE

## 2024-02-11 NOTE — PLAN OF CARE
Pt disoriented again this evening, worsening with narcotic pain meds. A/ox1-3. Hallucinating at times. Spoke with spine and Hospitalist, toradol ordered. Minimizing narcotics now that pain is better controlled. Bed alarm on, frequent rounding on patient. Reoriented patient when rounding. Educated on call light use.   Drain flushed per orders, 5 ml serous drainage out so far this shift.   IVF at KVO, continuing IV merrem.   O2 2-3 L per NC, encouraging IS use, 1000 ml achieved. Working to wean O2 as able.     Dressings to back incisions changed.   Suppository given for no BM in 8-9 days.

## 2024-02-11 NOTE — PROGRESS NOTES
[facility] Progress Note     Gloriadylan Mccrary Patient Status:  Inpatient    1950 MRN [id number]   [facility] 3SW-A Attending ASHLEE Holland MD   Hosp Day # 4 PCP Gordo Antonio MD     Chief Complaint: f/u L abdominal pain    Subjective:   S: called earlier that pt lethargic this AM, would arouse to voice but go back asleep.  When seen, pt arousable and says she's tired but able to answer questions with her eyes closed. Says does not recall being awoken earlier this AM. Received IV dilaudid approx 2100  Objective:   Vital signs:  Temp:  [97.8 °F (36.6 °C)-98.7 °F (37.1 °C)] 97.8 °F (36.6 °C)  Pulse:  [83-98] 85  Resp:  [15-18] 18  BP: (108-159)/(51-91) 127/63  SpO2:  [94 %-98 %] 98 %    Wt Readings from Last 6 Encounters:   24 190 lb (86.2 kg)   24 212 lb (96.2 kg)   23 205 lb (93 kg)   22 206 lb 6.4 oz (93.6 kg)   21 195 lb (88.5 kg)   21 210 lb (95.3 kg)         Physical Exam:    Gen: No acute distress, drowsy but arousable to voice- closes eyes to answer questions. no accessory m use, AxOx3  Pulm: Lungs clear, ok respiratory effort  CV: Heart with regular rate and rhythm, no edema  Abd: Abdomen soft, nontender, nondistended, bowel sounds present, no peritoneal signs  MSK:   no clubbing, no cyanosis  Skin: no visible rashes    Psych:   calm memory ok    Results:   Diagnostic Data:      Labs:       Recent Labs   Lab 24  1038 24  0452 24  0437 24  1532 24  0500   WBC 18.2* 18.4* 18.5*  --  11.2*   HGB 11.9* 11.5* 12.3  --  11.0*   MCV 97.7 98.1 97.0  --  100.8*   .0* 484.0* 497.0*  --  399.0   INR 1.21*  --   --  1.21*  --        Recent Labs   Lab 24  1038 24  0452 24  0437 24  0500    139 137 139   K 4.1 4.2 4.0 3.8    106 103 106   CO2 29.0 26.0 30.0 29.0   BUN 29* 21 24* 26*   CREATSERUM 1.16* 0.97 0.88 0.73   CA 9.5 8.9 9.3 9.0   MG  --  2.3 2.4  --    GLU  106* 91 136* 104*       Recent Labs   Lab 02/07/24  1038   ALT 26   AST 15   ALB 2.7*        COVID-19  Lab Results   Component Value Date    COVID19 Not Detected 02/17/2023    COVID19 NOT DETECTED 11/14/2020    COVID19 NOT DETECTED 10/31/2020       Medications:    potassium chloride  40 mEq Oral Once    multivitamin  1 tablet Oral Daily    meropenem  500 mg Intravenous Q8H    losartan  100 mg Oral Daily    amLODIPine  10 mg Oral Daily    atorvastatin  40 mg Oral QAM    docusate sodium  100 mg Oral BID    DULoxetine  20 mg Oral Daily    levothyroxine  75 mcg Oral Daily @ 0700    metoprolol succinate ER  25 mg Oral Daily      sodium chloride 125 mL/hr (02/07/24 1432)    sodium chloride 75 mL/hr at 02/07/24 1750     ketorolac, HYDROcodone-acetaminophen **OR** HYDROcodone-acetaminophen, diazePAM, bisacodyl, HYDROmorphone **OR** [DISCONTINUED] HYDROmorphone **OR** [DISCONTINUED] HYDROmorphone, acetaminophen, polyethylene glycol (PEG 3350), sennosides, ondansetron, metoclopramide, cyclobenzaprine          ASSESSMENT / PLAN:      73 year old female with mmp including but not limited to HTN/HL, asthma, seizure disorder, OA, MDD in partial remission is admitted for L sided abdominal pain.     **drowsiness, AMS- suspect secondary to IV dilaudid given last night  -minimize narcotics, d/c'ed higher doses of dilaudid  -monitor closely     **L sided abdominal pain secondary to  **loculated fluid collections around psoas muscle, abscess sp IR drainage of 53 ml of pus  -ID, spine on, appreciate. Seen by gen surg, appreciate  -BC, incision drainage cx with ESBL. Pct minimally elevated.     -ID consult, appreciate-on meropenem     **constipation as noted on CT a/p. Bowel regimen    **hypoxia- suspect splinting from pain and narcotic effect. Asymptomatic. IS as able. Monitor     **leukocytosis-suspect reactive     Stable chronic illnesses:  HTN/HL-home meds. Arb resume  Seizure disorder-not on meds  MDD in partial remission-home med      PPx-scds for now  Hematomas on imaging too    Dw pt and RN Sara     Thank You,  Winsome Hutton MD    [facility] Hospitalist  Internal Medicine  Answering Service number: [phone]    Supplementary Documentation:

## 2024-02-11 NOTE — PLAN OF CARE
Pt A&Ox4, drowsy/lethargic. VSS on 3L O2 NC. Seizure precautions in place, side rails padded. Telemetry monitoring, NSR. SCD's on. Coverlet dressings x3 intact and dry. MANOJ drain to bulb suction, flushed with 10cc normal saline. IV abx. Avoiding narcotics d/t mental status. Call light within reach. Bed in lowest position. Will continue to monitor.

## 2024-02-11 NOTE — PHYSICAL THERAPY NOTE
PHYSICAL THERAPY TREATMENT NOTE - INPATIENT    Room Number: 356/356-A     Session: 1     Number of Visits to Meet Established Goals: 5  History related to current admission: Patient is a 73 year old female admitted on 2/7/2024 from home for back pain that radiates to the front.  Pt diagnosed with L psoas abscess . S/p CT guided retroperitoneal abscess drainage 2/9. Recent surgery 1/18/24 as follows: L2-L3 fusion, removal of hardware L3-L5 and re instrumentation. Pt DC to home on 1/20/24.     Presenting Problem: abdominal pain  Co-Morbidities : HTN/HL, MDD, asthma, seizure disorder, OA    ASSESSMENT   Patient demonstrates fair progress this session, goals  remain in progress.    Patient continues to function below baseline with bed mobility, transfers, gait, and stair negotiation.  Contributing factors to remaining limitations include decreased functional strength, pain, and impaired dynamic standing balance.  Next session anticipate patient to progress gait and stair negotiation.  Physical Therapy will continue to follow patient for duration of hospitalization.    Patient continues to benefit from continued skilled PT services: at discharge to promote functional independence in home.  Anticipate patient will return home with home health PT.    PLAN  PT Treatment Plan: Bed mobility;Body mechanics;Endurance;Energy conservation;Patient education;Family education;Gait training;Strengthening;Stair training;Transfer training;Balance training  Rehab Potential : Good  Frequency (Obs): Daily    CURRENT GOALS     Goal #1 Patient is able to demonstrate supine - sit EOB @ level: modified independent      Goal #2 Patient is able to demonstrate transfers Sit to/from Stand at assistance level: modified independent      Goal #3 Patient is able to ambulate 150 feet with assist device: walker - rolling at assistance level: modified independent      Goal #4 Pt to ascend/descend 2 steps with supervision.    Goal #5     Goal #6      Goal Comments: Goals established on 2/10/2024  2/11/2024 all goals ongoing  SUBJECTIVE  \" I'm just so scared of the pain\"     OBJECTIVE  Precautions: Bed/chair alarm;Spine    WEIGHT BEARING RESTRICTION                   PAIN ASSESSMENT   Rating: Unable to rate  Location: L LBP  Management Techniques: Activity promotion;Body mechanics;Repositioning    BALANCE                                                                                                                       Static Sitting: Good  Dynamic Sitting: Good           Static Standing: Fair -  Dynamic Standing: Fair -    ACTIVITY TOLERANCE                         O2 WALK  Oxygen Therapy  SPO2% on Room Air at Rest: 97      AM-PAC '6-Clicks' INPATIENT SHORT FORM - BASIC MOBILITY  How much difficulty does the patient currently have...  Patient Difficulty: Turning over in bed (including adjusting bedclothes, sheets and blankets)?: None   Patient Difficulty: Sitting down on and standing up from a chair with arms (e.g., wheelchair, bedside commode, etc.): A Little   Patient Difficulty: Moving from lying on back to sitting on the side of the bed?: A Little   How much help from another person does the patient currently need...   Help from Another: Moving to and from a bed to a chair (including a wheelchair)?: A Little   Help from Another: Need to walk in hospital room?: A Little   Help from Another: Climbing 3-5 steps with a railing?: A Lot       AM-PAC Score:  Raw Score: 18   Approx Degree of Impairment: 46.58%   Standardized Score (AM-PAC Scale): 43.63   CMS Modifier (G-Code): CK    FUNCTIONAL ABILITY STATUS  Gait Assessment   Functional Mobility/Gait Assessment  Gait Assistance: Contact guard assist  Distance (ft): 15  Assistive Device: Rolling walker  Pattern: Shuffle    Skilled Therapy Provided    Bed Mobility:  Rolling: mod I    Supine<>Sit: min A - re-educated on mechanics for log roll technique, hand over hand for reaching to bedrail with LUE, cuing for  active L knee flexion to promote transfer, vc for sequencing of task appropriately, pt utilized therapists UE for leverage from sidelying to sitting      Transfer Mobility:  Sit<>Stand: CGA   Stand<>Sit: CGA   Gait: CGA - pt able to ambulate 15ft to bathroom with vc for RW placement (closer to patient), smaller step length to promote inc stability as demonstrating dynamic balance impairments, required additional cuing for motor planning during transfers/navigating in small space        THERAPEUTIC EXERCISES  Lower Extremity Ankle pumps  Glut sets  LAQ  Quad sets     Upper Extremity      Position Sitting and Supine     Repetitions   10   Sets   1     Patient End of Session: In bathroom - nursing staff aware;RN aware of session/findings;Needs met;All patient questions and concerns addressed    PT Session Time: 23 minutes  Gait Training:  minutes  Therapeutic Activity: 23 minutes  Therapeutic Exercise:  minutes   Neuromuscular Re-education:  minutes

## 2024-02-11 NOTE — PROGRESS NOTES
Patient difficult to wake this am, rouses to voice and touch, however mumbles incoherently and falls back to sleep. Cannot answer orientation questions correctly at this time. Disoriented to place and time.   Maintains sats on 2-3L. Last narcotic pain medications given 2130 2/10/24. Updated hospitalist to patient's current mental status.

## 2024-02-12 LAB
ERYTHROCYTE [DISTWIDTH] IN BLOOD BY AUTOMATED COUNT: 13.1 %
HCT VFR BLD AUTO: 36.9 %
HGB BLD-MCNC: 11.4 G/DL
MCH RBC QN AUTO: 30.6 PG (ref 26–34)
MCHC RBC AUTO-ENTMCNC: 30.9 G/DL (ref 31–37)
MCV RBC AUTO: 99.2 FL
PLATELET # BLD AUTO: 433 10(3)UL (ref 150–450)
POTASSIUM SERPL-SCNC: 4.3 MMOL/L (ref 3.5–5.1)
RBC # BLD AUTO: 3.72 X10(6)UL
WBC # BLD AUTO: 10 X10(3) UL (ref 4–11)

## 2024-02-12 PROCEDURE — 97530 THERAPEUTIC ACTIVITIES: CPT

## 2024-02-12 PROCEDURE — 84132 ASSAY OF SERUM POTASSIUM: CPT | Performed by: HOSPITALIST

## 2024-02-12 PROCEDURE — 97116 GAIT TRAINING THERAPY: CPT

## 2024-02-12 PROCEDURE — 85027 COMPLETE CBC AUTOMATED: CPT | Performed by: HOSPITALIST

## 2024-02-12 NOTE — PROGRESS NOTES
[facility] Progress Note     Gloria Mccrary Patient Status:  Inpatient    1950 MRN [id number]   [facility] 3SW-A Attending ASHLEE Holland MD   Hosp Day # 5 PCP Gordo Antonio MD     Chief Complaint: f/u L abdominal pain    Subjective:   S:     She is awake.  On 2L NC.  Afebrile.        Objective:   Vital signs:  Temp:  [98.1 °F (36.7 °C)-98.6 °F (37 °C)] 98.4 °F (36.9 °C)  Pulse:  [87-95] 87  Resp:  [17-20] 20  BP: (126-145)/(48-76) 134/48  SpO2:  [94 %-97 %] 95 %    Wt Readings from Last 6 Encounters:   24 190 lb (86.2 kg)   24 212 lb (96.2 kg)   23 205 lb (93 kg)   22 206 lb 6.4 oz (93.6 kg)   21 195 lb (88.5 kg)   21 210 lb (95.3 kg)         Physical Exam:    Gen: No acute distress, drowsy but arousable to voice- closes eyes to answer questions. no accessory m use, AxOx3  Pulm: Lungs clear, ok respiratory effort  CV: Heart with regular rate and rhythm, no edema  Abd: Abdomen soft, nontender, nondistended, bowel sounds present, no peritoneal signs  MSK:   no clubbing, no cyanosis  Skin: no visible rashes    Psych:   calm memory ok    Results:   Diagnostic Data:      Labs:       Recent Labs   Lab 24  1038 24  0452 24  0437 24  1532 24  0500 24  0528   WBC 18.2* 18.4* 18.5*  --  11.2* 10.0   HGB 11.9* 11.5* 12.3  --  11.0* 11.4*   MCV 97.7 98.1 97.0  --  100.8* 99.2   .0* 484.0* 497.0*  --  399.0 433.0   INR 1.21*  --   --  1.21*  --   --        Recent Labs   Lab 24  1038 24  0452 24  0437 24  0500 24  0528    139 137 139  --    K 4.1 4.2 4.0 3.8 4.3    106 103 106  --    CO2 29.0 26.0 30.0 29.0  --    BUN 29* 21 24* 26*  --    CREATSERUM 1.16* 0.97 0.88 0.73  --    CA 9.5 8.9 9.3 9.0  --    MG  --  2.3 2.4  --   --    * 91 136* 104*  --        Recent Labs   Lab 24  1038   ALT 26   AST 15   ALB 2.7*        COVID-19  Lab Results    Component Value Date    COVID19 Not Detected 02/17/2023    COVID19 NOT DETECTED 11/14/2020    COVID19 NOT DETECTED 10/31/2020       Medications:    potassium chloride  40 mEq Oral Once    multivitamin  1 tablet Oral Daily    meropenem  500 mg Intravenous Q8H    losartan  100 mg Oral Daily    amLODIPine  10 mg Oral Daily    atorvastatin  40 mg Oral QAM    docusate sodium  100 mg Oral BID    DULoxetine  20 mg Oral Daily    levothyroxine  75 mcg Oral Daily @ 0700    metoprolol succinate ER  25 mg Oral Daily      sodium chloride 125 mL/hr (02/07/24 1432)    sodium chloride 75 mL/hr at 02/07/24 1750     ketorolac, HYDROcodone-acetaminophen **OR** HYDROcodone-acetaminophen, diazePAM, bisacodyl, HYDROmorphone **OR** [DISCONTINUED] HYDROmorphone **OR** [DISCONTINUED] HYDROmorphone, acetaminophen, polyethylene glycol (PEG 3350), sennosides, ondansetron, metoclopramide, cyclobenzaprine          ASSESSMENT / PLAN:      73 year old female with mmp including but not limited to HTN/HL, asthma, seizure disorder, OA, MDD in partial remission is admitted for L sided abdominal pain.     **drowsiness, AMS- suspect secondary to IV dilaudid- IMPROVED  -minimize narcotics, d/c'ed higher doses of dilaudid  -monitor closely     **L sided abdominal pain secondary to  **loculated fluid collections around psoas muscle, abscess sp IR drainage of 53 ml of pus  -ID, spine on, appreciate. Seen by gen surg, appreciate  -BC, incision drainage cx with ESBL. Pct minimally elevated.     -ID consult, appreciate-on meropenem     **constipation as noted on CT a/p. Bowel regimen    **hypoxia- suspect splinting from pain and narcotic effect. Asymptomatic. IS as able. Monitor     **leukocytosis-suspect reactive     Stable chronic illnesses:  HTN/HL-home meds. Arb resume  Seizure disorder-not on meds  MDD in partial remission-home med     PPx-scds for now      Dw pt and RN Sara [facility] Hospitalist  Internal  Medicine  [facility] Service number: [phone]

## 2024-02-12 NOTE — PLAN OF CARE
Pt AAO x4. VSS. On 2L NC. Seizure precautions. . IS encouraged. Telemetry monitoring. SCDs on BLE. Coverlet dressings to lower back intact, MANOJ drain to bulb suction. IV abx as ordered. Safety precautions maintained. Call light within reach.     0544:Pt states to sleep a total of 5 hours this shift.

## 2024-02-12 NOTE — PROGRESS NOTES
[facility]  Progress Note      Gloria Mccrary Patient Status:  Inpatient    1950 MRN [id number]   Location [facility] 3SW-A Attending Winsome Hutton, *   Hosp Day # 5 PCP Gordo Antonio MD       Subjective:   No drain related complaints.     Objective:   NTTP drain site. Dressing c/d/I.    Vital Signs:  Blood pressure 140/51, pulse 93, temperature 98.6 °F (37 °C), temperature source Oral, resp. rate 20, height 63\", weight 190 lb, SpO2 96%, not currently breastfeeding.    Input/Output:    Intake/Output Summary (Last 24 hours) at 2024 1054  Last data filed at 2024 0748  Gross per 24 hour   Intake 1100 ml   Output 637 ml   Net 463 ml     24 hour drain output: 37 ml    Results:   Labs:  Lab Results   Component Value Date    WBC 10.0 2024    RBC 3.72 2024    HGB 11.4 2024    HCT 36.9 2024    MCV 99.2 2024    MCH 30.6 2024    MCHC 30.9 2024    RDW 13.1 2024    .0 2024       Microbiology:  Pending    Assessment and Plan:   73 year-old femaie with left psoas abscess s/p drain placement   - Continue current drain management, 37ml output over 24hr  - CT Abscessogram when output < 10 cc/day  - Consider drain removal if output is < 10 cc/day x 2 days and no fistulous communication.      Eran Valentin MD  2024  10:54 AM

## 2024-02-12 NOTE — PAYOR COMM NOTE
--------------  REQUEST FOR RECONSIDERATION    2/9- 11 CONTINUED STAY REVIEW    Payor: [employer]  Subscriber #:  [id number]  Authorization Number: [id number]    2/9:   Procedure Note       Procedure: CT guided retroperitoneal abscess drainage   Pre-Procedure Diagnosis:  left psoas abscess      Findings:  pus   Specimens: 53 ml     Drains:  10.2 Fr drain left posterolateral back        2/10:    ID:    Pain under control.       Physical Exam:      Vitals:     02/10/24 0350   BP: 132/71   Pulse: 93   Resp: 15   Temp: 98.9 °F (37.2 °C)      Vitals signs and nursing note reviewed.   Constitutional:       Appearance: Normal appearance.   HENT:      Head: Normocephalic and atraumatic.      Mouth: Mucous membranes are moist.   Neck:      Musculoskeletal: Neck supple.   Cardiovascular:      Rate and Rhythm: Normal rate.   Pulmonary:      Effort: Pulmonary effort is normal. No respiratory distress.   Abdominal:      General: Abdomen is flat. There is no distension.  Drain noted with small amount of purulent drainage     Palpations: Abdomen is soft. There is no mass.      Tenderness: There is mild lower abdominal tenderness. There is no guarding or rebound.      Hernia: No hernia is present.   Musculoskeletal:      Right lower leg: No edema.      Left lower leg: No edema.   Skin:     General: Skin is warm and dry.   Neurological:      General: No focal deficit present.      Mental Status: Alert and oriented to person, place, and time.         Impression:  Gloria Mccrary is a 73 year old female with     Retroperitoneal/left psoas abscess/infected hematoma  This is in the setting of recent left L2-L3 lateral interbody fusion and PSF of L2-L3 with SHYANNE of L3-L5 and reinstrumentation back on 1/18/2024  It is difficult to discern whether these abscesses are related to the above directly or not.  It could be an infected hematoma  Now status post IR drainage on 2/9/2024 with pus noted  Cultures pending  However, wound  cultures with ESBL producing Klebsiella pneumoniae  Currently on meropenem  History of seizure disorders  Will monitor closely  Has not had a seizure in many years     Recommendations:    Continue IV meropenem, risk of seizure is lowest compared to other carbapenems  I discussed with the patient the risk of her developing a seizure on meropenem.  Her seizures were partial complex seizures.  She develops an aura that she recognizes distinctively.  She knows to call us right away if she develops that aura.  Continue to follow-up on cultures from IR drainage  Continue to monitor daily labs for antibiotic toxicities  Further recommendations will depend on the above work-up and clinical progress       IR:    - Drain functioning well with elevated outputs  - Continue current drain management  - CT Abscessogram when output < 10 cc/day  - Consider drain removal if output is < 10 cc/day x 2 days and no fistulous communication.      HOSPITALIST:    S:  went to IR yesterday for drainage of abscess to psoas  ->53 ml of pus. Now on meropenem. Pt feels ok today but wants a sponge bath   Required IV dilaudid approx 0200    Vital signs:  Temp:  [97.9 °F (36.6 °C)-98.9 °F (37.2 °C)] 98.7 °F (37.1 °C)  Pulse:  [80-93] 91  Resp:  [12-21] 16  BP: ()/(50-76) 139/57  SpO2:  [88 %-98 %] 94 %    Medications:    multivitamin  1 tablet Oral Daily    meropenem  500 mg Intravenous Q8H    losartan  100 mg Oral Daily    amLODIPine  10 mg Oral Daily    atorvastatin  40 mg Oral QAM    docusate sodium  100 mg Oral BID    DULoxetine  20 mg Oral Daily    levothyroxine  75 mcg Oral Daily @ 0700    metoprolol succinate ER  25 mg Oral Daily       ASSESSMENT / PLAN:       73 year old female with mmp including but not limited to HTN/HL, asthma, seizure disorder, OA, MDD in partial remission is admitted for L sided abdominal pain.      **L sided abdominal pain secondary to  **loculated fluid collections around psoas muscle, abscess sp IR drainage of  53 ml of pus  -ID, spine on, appreciate. Seen by gen surg, appreciate  -BC, incision drainage cx with ESBL. Pct minimally elevated.     -ID consult, appreciate-on meropenem     **constipation as noted on CT a/p. Bowel regimen     **hypoxia- suspect splinting from pain and narcotic effect. Asymptomatic. IS as able. Monitor     **leukocytosis-suspect reactive     Stable chronic illnesses:  HTN/HL-home meds. Arb resume  Seizure disorder-not on meds  MDD in partial remission-home med     PPx-scds for now  Hematomas on imaging too      2/11:     ID:      Lab 02/08/24  0452 02/11/24  0500   RBC 3.75* 3.55*   HGB 11.5* 11.0*   HCT 36.8 35.8   MCV 98.1 100.8*   MCH 30.7 31.0   MCHC 31.3 30.7*   RDW 13.2 12.8   NEPRELIM 15.59*  --    WBC 18.4* 11.2*   .0* 399.0    < > = values in this interval not displayed.     Impression:  Gloria Mccrary is a 73 year old female with     Retroperitoneal/left psoas abscess/infected hematoma  This is in the setting of recent left L2-L3 lateral interbody fusion and PSF of L2-L3 with SHYANNE of L3-L5 and reinstrumentation back on 1/18/2024  Those are most likely infected hematomas  Now status post IR drainage on 2/9/2024 with pus noted  Cultures with no growth to date  However, wound cultures with ESBL producing Klebsiella pneumoniae  Currently on meropenem  History of seizure disorders  Will monitor closely  Has not had a seizure in many years     Recommendations:    Continue IV meropenem, risk of seizure is lowest compared to other carbapenems  Continue to follow-up on cultures from IR drainage  Continue to monitor daily labs for antibiotic toxicities  Further recommendations will depend on the above work-up and clinical progress     MEDICATIONS ADMINISTERED IN LAST 1 DAY:    ketorolac (Toradol) 15 MG/ML injection 15 mg       Date Action Dose Route User    2/12/2024 0858 Given 15 mg Intravenous Sara Monae, RN    2/11/2024 1600 Given 15 mg Intravenous Sara Monae, ERMA          meropenem  (Merrem) 500 mg in sodium chloride 0.9% 100 mL IVPB-MBP       Date Action Dose Route User    2/12/2024 0859 New Bag 500 mg Intravenous Sara Monae, RN    2/12/2024 0051 New Bag 500 mg Intravenous Dori Oneil, RN    2/11/2024 1600 New Bag 500 mg Intravenous Sara Monae, RN          Vitals (last day)       Date/Time Temp Pulse Resp BP SpO2 Weight O2 Device O2 Flow Rate (L/min) Who    02/12/24 0811 98.6 °F (37 °C) 93 20 140/51 96 % -- Nasal cannula 3 L/min LW    02/12/24 0406 98.4 °F (36.9 °C) 89 18 126/58 95 % -- Nasal cannula 3 L/min KL    02/11/24 2343 98.6 °F (37 °C) 87 17 132/60 97 % -- Nasal cannula 3 L/min KL    02/11/24 2010 98.6 °F (37 °C) 95 17 130/54 94 % -- Nasal cannula 3 L/min KL    02/11/24 1400 98.1 °F (36.7 °C) 92 18 145/76 95 % -- Nasal cannula 3 L/min GG    02/11/24 1228 98.4 °F (36.9 °C) 88 18 151/63 95 % -- Nasal cannula 3 L/min GG    02/11/24 0810 97.8 °F (36.6 °C) 85 18 127/63 98 % -- Nasal cannula 3 L/min GG    02/11/24 0610 -- -- -- -- 96 % -- Nasal cannula 3 L/min JE    02/11/24 0400 98.1 °F (36.7 °C) 90 18 108/85 96 % -- Nasal cannula 3 L/min ZG    02/11/24 0026 98.2 °F (36.8 °C) 98 18 116/91 98 % -- Nasal cannula 2 L/min ZG

## 2024-02-12 NOTE — PLAN OF CARE
Pt A&Ox4. VSS on 2L O2 NC. Seizure precautions in place, side rails padded. Telemetry monitoring, NSR. SCD's on. Coverlet dressings x3 intact and dry. MANOJ drain to bulb suction, flushed with 10cc normal saline. IV abx. IV toradol given for c/o \"mild\" pain. Call light within reach. Bed in lowest position. Will continue to monitor.

## 2024-02-12 NOTE — PROGRESS NOTES
[facility]     [place] [facility] Disease Consult    Gloria Mccrary Patient Status:  Inpatient    1950 MRN [id number]   Location [facility] 3SW-A Attending Winsome Hutton, *   Hosp Day # 5 PCP MD Gloria Isaacs seen and examined,   Afebrile,   Previous entries noted,   Has pain in L flank area,   MANOJ in place,       History:  Past Medical History:   Diagnosis Date    ASTHMA     Asthma     Back problem     Calculus of kidney     Complex partial seizure disorder (HCC)     DEPRESSION     Disorder of thyroid     High blood pressure     High cholesterol     History of blood transfusion     HYPERLIPIDEMIA     HYPERTENSION     Macular degeneration     Muscle weakness     NAVEEN (obstructive sleep apnea) [facility] -6-10     AHI 17  RDI 20  REM AHI 34  YULY 76%    OSTEOARTHRITIS     Osteoarthritis     Seizure disorder (HCC)     last seizure was 2018    Sleep apnea     no device     Past Surgical History:   Procedure Laterality Date    BENIGN BIOPSY LEFT      15-20 years ago    CATARACT      COLONOSCOPY      COLONOSCOPY,BIOPSY  2007    Performed by MARY JO RUSH at [facility], [facility]    HYSTERECTOMY      KNEE REPLACEMENT SURGERY      Right 2010    KNEE REPLACEMENT SURGERY Left 2017    Total    LUMBAR SPINE FUSION,ANTER APPRCH      OTHER SURGICAL HISTORY      Lumbar 3-4 ingrid laminectomy. microdicectomy     Family History   Problem Relation Age of Onset    Hypertension Mother     Diabetes Mother     Breast Cancer Other 60        mat aunt    Breast Cancer Maternal Grandmother 40        Age at dx 40s      reports that she quit smoking about 34 years ago. Her smoking use included cigarettes. She has a 15 pack-year smoking history. She has never used smokeless tobacco. She reports that she does not drink alcohol and does not use drugs.    Allergies:  Allergies   Allergen Reactions    Meloxicam CONFUSION       Medications:    Current Facility-Administered  Medications:     potassium chloride (K-Dur) tab 40 mEq, 40 mEq, Oral, Once    ketorolac (Toradol) 15 MG/ML injection 15 mg, 15 mg, Intravenous, Q6H PRN    HYDROcodone-acetaminophen (Norco)  MG per tab 1 tablet, 1 tablet, Oral, Q6H PRN **OR** HYDROcodone-acetaminophen (Norco)  MG per tab 2 tablet, 2 tablet, Oral, Q6H PRN    multivitamin (Centrum) chewable tab (Adult) 1 tablet, 1 tablet, Oral, Daily    meropenem (Merrem) 500 mg in sodium chloride 0.9% 100 mL IVPB-MBP, 500 mg, Intravenous, Q8H    diazePAM (Valium) tab 5 mg, 5 mg, Oral, Q8H PRN    bisacodyl (Dulcolax) 10 MG rectal suppository 10 mg, 10 mg, Rectal, Daily PRN    losartan (Cozaar) tab 100 mg, 100 mg, Oral, Daily    sodium chloride 0.9% infusion, 125 mL/hr, Intravenous, Continuous    HYDROmorphone (Dilaudid) 1 MG/ML injection 0.2 mg, 0.2 mg, Intravenous, Q2H PRN **OR** [DISCONTINUED] HYDROmorphone (Dilaudid) 1 MG/ML injection 0.4 mg, 0.4 mg, Intravenous, Q2H PRN **OR** [DISCONTINUED] HYDROmorphone (Dilaudid) 1 MG/ML injection 0.8 mg, 0.8 mg, Intravenous, Q2H PRN    amLODIPine (Norvasc) tab 10 mg, 10 mg, Oral, Daily    atorvastatin (Lipitor) tab 40 mg, 40 mg, Oral, QAM    docusate sodium (Colace) cap 100 mg, 100 mg, Oral, BID    DULoxetine (Cymbalta) DR cap 20 mg, 20 mg, Oral, Daily    levothyroxine (Synthroid) tab 75 mcg, 75 mcg, Oral, Daily @ 0700    metoprolol succinate ER (Toprol XL) 24 hr tab 25 mg, 25 mg, Oral, Daily    sodium chloride 0.9% infusion, , Intravenous, Continuous    acetaminophen (Tylenol Extra Strength) tab 500 mg, 500 mg, Oral, Q4H PRN    polyethylene glycol (PEG 3350) (Miralax) 17 g oral packet 17 g, 17 g, Oral, Daily PRN    sennosides (Senokot) tab 17.2 mg, 17.2 mg, Oral, Nightly PRN    ondansetron (Zofran) 4 MG/2ML injection 4 mg, 4 mg, Intravenous, Q6H PRN    metoclopramide (Reglan) 5 mg/mL injection 5 mg, 5 mg, Intravenous, Q8H PRN    cyclobenzaprine (Flexeril) tab 10 mg, 10 mg, Oral, TID PRN    Review of Systems:    Constitutional: Negative for anorexia, chills, fatigue, fevers, malaise, night sweats and weight loss.  Eyes: Negative for visual disturbance, irritation and redness.  Ears, nose, mouth, throat, and face: Negative for hearing loss, tinnitus, nasal congestion, snoring, sore throat, hoarseness and voice change.  Respiratory: Negative for cough, sputum, hemoptysis, chest pain, wheezing, dyspnea on exertion, or stridor.  Cardiovascular: Negative for chest pain, palpitations, irregular heart beats, syncope, fatigue, orthopnea, paroxysmal nocturnal dyspnea, lower extremity edema.  Gastrointestinal: Negative for dysphagia, odynophagia, reflux symptoms,   Integument/breast: Negative for rash, skin lesions, and pruritus.  Hematologic/lymphatic: Negative for easy bruising, bleeding, and lymphadenopathy.  Musculoskeletal: Negative for myalgias, arthralgias, muscle weakness.  Neurological: Negative for headaches, dizziness, seizures, memory problems, trouble swallowing, speech problems, gait problems and weakness.  Behavioral/Psych: Negative for active tobacco use.  Endocrine: No history of of diabetes, thyroid disorder.  All other review of systems are negative.    Vital signs in last 24 hours:  Patient Vitals for the past 24 hrs:   BP Temp Temp src Pulse Resp SpO2   02/12/24 1224 134/48 98.4 °F (36.9 °C) Tympanic 87 20 95 %   02/12/24 0811 140/51 98.6 °F (37 °C) Oral 93 20 96 %   02/12/24 0406 126/58 98.4 °F (36.9 °C) Oral 89 18 95 %   02/11/24 2343 132/60 98.6 °F (37 °C) Oral 87 17 97 %   02/11/24 2010 130/54 98.6 °F (37 °C) Oral 95 17 94 %   02/11/24 1400 145/76 98.1 °F (36.7 °C) Oral 92 18 95 %       Physical Exam:   General: alert, cooperative, oriented.  No respiratory distress.   Head: Normocephalic, without obvious abnormality, atraumatic.   Eyes: Conjunctivae/corneas clear.  No scleral icterus.  No conjunctival     hemorrhage.   Nose: Nares normal.   Throat: Lips, mucosa, and tongue normal.  No thrush noted.   Neck:  Soft, supple neck; trachea midline, no adenopathy, no thyromegaly.   Lungs: CTAB, normal and equal bilateral chest rise   Chest wall: No tenderness or deformity.   Heart: Regular rate and rhythm, normal S1S2, no murmur.   Abdomen: soft, non-tender, non-distended, no masses, no guarding, no     rebound, positive BS.   Extremity: no edema, no cyanosis   Skin: No rashes or lesions.   Neurological: Alert, interactive, no focal deficits    Labs:  Lab Results   Component Value Date    WBC 10.0 02/12/2024    HGB 11.4 02/12/2024    HCT 36.9 02/12/2024    .0 02/12/2024    K 4.3 02/12/2024       Radiology:  Mri- 1. As noted on recent CT imaging, patient is status post recent lumbar fusion with complex fluid collection noted along the left psoas muscle that appears to have slightly increased in size from prior imaging and is noted to have internal fluid/fluid level suggesting a liquefying hematoma.  This collection currently measures ~ 7.5 x 3.2 x 10.7 cm in dimensions. There is no abnormal post-contrast enhancement of this collection.  The possibly of  an infected seroma is considered unlikely however correlation with clinical findings is recommended. 2. Smaller seroma/liquefying hematoma collection within the right psoas muscle also noted measuring 0.9 cm in maximal dimension. 3. Multiple small cystic foci along the pancreas with mild prominence of the pancreatic duct.  The possibility of IPMN cannot be excluded.       Cultures:  Reviewed,     Assessment and Plan:    1.    Post operative infection: with L Psoas Abscess in the setting of recent L2-L3 surgery on 1/18/24.  - Possible infected hematomas, S/P IR drainage on 2/09/24, GS with 3+ WBC, Cul are NGTD  - Wound Cul with Kleb Pneumo ( ESBL), No WBC on GS though,   - Mri with L Psoas muscle collection with no enhancement,   - MANOJ in place with serous drainage,    - On IV Meropenem, pharm to dose,     2.    Hx of recent left L2-L3 lateral interbody fusion and PSF of  L2-L3 with SHYANNE of L3-L5 and reinstrumentation back on 1/18/2024   - original surgery in 2004, Noted Ortho spine input,   - Conservative management so far,     3.    HX of Sz disorder: Sz free for many years,   - ? Post concussion, from 9265-9415,   - Will need Neuro input to assess the risk for break through Sz while on IV Meropenem, ? Adding antisz medicine,     4.    Disposition: in house, An elderly female with post operative seroma/ Hematoma, with possible secondary bacterial infection, although MRI with no post contrast enhancement and Cul of drainage is NGTD, ? Post abx,   - Will continue IV Meropenem, pharm to dose,   - Will need PICC line and IV abx as OP,  - Neuro input for decision to start anti sz while on Meropenem,   - Pain management per PCP,     Discussed with patient, RN, all questions answered, further recommendatiosn to follow, Thanks,     Thank you for consulting [facility] for Gloria Mccrary.  If you have any questions or concerns please call [facility] Infectious Disease at [phone].     John Jane MD  2/12/2024  1:03 PM

## 2024-02-12 NOTE — PHYSICAL THERAPY NOTE
PHYSICAL THERAPY TREATMENT NOTE - INPATIENT    Room Number: 356/356-A     Session: 1     Number of Visits to Meet Established Goals: 5    Presenting Problem: abdominal pain  Co-Morbidities : HTN/HL, MDD, asthma, seizure disorder, OA    History related to current admission: Patient is a 73 year old female admitted on 2/7/2024 from home for back pain that radiates to the front.  Pt diagnosed with L psoas abscess . S/p CT guided retroperitoneal abscess drainage 2/9. Recent surgery 1/18/24 as follows: L2-L3 fusion, removal of hardware L3-L5 and re instrumentation. Pt DC to home on 1/20/24.     Prior Level of function: Prior to lumbar surgery, pt was using a can, and sometimes RW. Pt was Mod I for ADL. Pt uses electric scooter at the store. Since surgery, pt is using RW for ambulation, and Mod I for ADL.     ASSESSMENT   Chart reviewed, and RN approved PT session. Patient demonstrates good  progress this session, goals  remain in progress.    Patient continues to function below baseline with bed mobility, transfers, gait, stair negotiation, maintaining seated position, and standing prolonged periods.  Contributing factors to remaining limitations include decreased functional strength, decreased endurance/aerobic capacity, pain, impaired standing balance, decreased muscular endurance, and difficulty maintaining precautions.  Next session anticipate patient to progress bed mobility, transfers, gait, and stair negotiation.  Physical Therapy will continue to follow patient for duration of hospitalization.    Patient continues to benefit from continued skilled PT services: at discharge to promote functional independence in home.  Anticipate patient will return home with home health PT.    PLAN  PT Treatment Plan: Bed mobility;Body mechanics;Endurance;Energy conservation;Patient education;Family education;Gait training;Strengthening;Stair training;Transfer training;Balance training  Rehab Potential : Good  Frequency (Obs):  Daily    CURRENT GOALS     Goal #1 Patient is able to demonstrate supine - sit EOB @ level: modified independent     Goal #2 Patient is able to demonstrate transfers Sit to/from Stand at assistance level: modified independent     Goal #3 Patient is able to ambulate 150 feet with assist device: walker - rolling at assistance level: modified independent     Goal #4 Pt to ascend/descend 2 steps with supervision.    Goal #5    Goal #6    Goal Comments: Goals established on 2/10/2024  2024 all goals ongoing    SUBJECTIVE  Pt is pleasant and cooperative.     OBJECTIVE  Precautions: Bed/chair alarm;Spine    WEIGHT BEARING RESTRICTION                   PAIN ASSESSMENT   Ratin  Location: Low back pain, anterior hip pain  Management Techniques: Activity promotion;Relaxation;Repositioning;Body mechanics    BALANCE                                                                                                                       Static Sitting: Good  Dynamic Sitting: Good           Static Standing: Poor +  Dynamic Standing: Poor +    ACTIVITY TOLERANCE  Pulse: 85  Heart Rate Source: Monitor                   O2 WALK  Oxygen Therapy  SPO2% on Room Air at Rest: 96  SPO2% Ambulation on Room Air: 95      AM-PAC '6-Clicks' INPATIENT SHORT FORM - BASIC MOBILITY  How much difficulty does the patient currently have...  Patient Difficulty: Turning over in bed (including adjusting bedclothes, sheets and blankets)?: None   Patient Difficulty: Sitting down on and standing up from a chair with arms (e.g., wheelchair, bedside commode, etc.): A Little   Patient Difficulty: Moving from lying on back to sitting on the side of the bed?: A Little   How much help from another person does the patient currently need...   Help from Another: Moving to and from a bed to a chair (including a wheelchair)?: A Little   Help from Another: Need to walk in hospital room?: A Little   Help from Another: Climbing 3-5 steps with a railing?: A Lot        AM-PAC Score:  Raw Score: 18   Approx Degree of Impairment: 46.58%   Standardized Score (AM-PAC Scale): 43.63   CMS Modifier (G-Code): CK    FUNCTIONAL ABILITY STATUS  Gait Assessment   Functional Mobility/Gait Assessment  Gait Assistance: Contact guard assist  Distance (ft): 75  Assistive Device: Rolling walker  Pattern: Shuffle    Skilled Therapy Provided    Bed Mobility:  Rolling: supervision   Supine<>Sit: Min A   Sit<>Supine: NT     Transfer Mobility:  Sit<>Stand: supervision   Stand<>Sit: supervision   Gait: supervision    Therapist's Comments: vitals assessed and WNL throughout session. Pt unable to recall spine precautions. Inc time spent on education, discussion and instruction on spine precautions and how these are integrated with functional activities. Pt instructed in proper body mechanics and log rolling technique. Pt required Min A for sidelying to sit to EOB. Pt instructed in proper hand placement and integration of RW. Pt instructed in upright posture, and to keep RW within ERASMO. Instructed to inc step length B, as pt demonstrates short shuffled steps. Pt returned to room, in chair with all needs in reach. RN notified of session. Alarm on.        Patient End of Session: Up in chair;Needs met;Call light within reach;RN aware of session/findings;All patient questions and concerns addressed;Alarm set;Family present    PT Session Time: 30 minutes  Gait Training: 15 minutes  Therapeutic Activity: 15 minutes  Therapeutic Exercise: 0 minutes   Neuromuscular Re-education: 0 minutes

## 2024-02-13 RX ORDER — ERTAPENEM 1 G/1
1 INJECTION, POWDER, LYOPHILIZED, FOR SOLUTION INTRAMUSCULAR; INTRAVENOUS DAILY
Qty: 24 EACH | Refills: 0 | Status: SHIPPED | OUTPATIENT
Start: 2024-02-13 | End: 2024-03-08

## 2024-02-13 NOTE — CM/SW NOTE
CM met with patient to discuss IV ABX costs. Pt agreeable to paying $146.81 & possibly 20% of supplies. PICC will be placed in the morning.    WALESKA Duran RN, CM

## 2024-02-13 NOTE — PAYOR COMM NOTE
--------------  REQUEST FOR RECONSIDERATION    2/12 CONTINUED STAY REVIEW    Payor: [employer]  Subscriber #:  [id number]  Authorization Number: [id number]    ID:    Has pain in L flank area,   MANOJ in place,      24 hour drain output: 37 ml     Physical Exam:               General: alert, cooperative, oriented.  No respiratory distress.               Head: Normocephalic, without obvious abnormality, atraumatic.               Eyes: Conjunctivae/corneas clear.  No scleral icterus.  No conjunctival                    hemorrhage.               Nose: Nares normal.               Throat: Lips, mucosa, and tongue normal.  No thrush noted.               Neck: Soft, supple neck; trachea midline, no adenopathy, no thyromegaly.               Lungs: CTAB, normal and equal bilateral chest rise               Chest wall: No tenderness or deformity.               Heart: Regular rate and rhythm, normal S1S2, no murmur.               Abdomen: soft, non-tender, non-distended, no masses, no guarding, no                 rebound, positive BS.               Extremity: no edema, no cyanosis               Skin: No rashes or lesions.               Neurological: Alert, interactive, no focal deficits     Labs:        Lab Results   Component Value Date     WBC 10.0 02/12/2024     HGB 11.4 02/12/2024     HCT 36.9 02/12/2024     .0 02/12/2024     K 4.3 02/12/2024        Radiology:  Mri- 1. As noted on recent CT imaging, patient is status post recent lumbar fusion with complex fluid collection noted along the left psoas muscle that appears to have slightly increased in size from prior imaging and is noted to have internal fluid/fluid level suggesting a liquefying hematoma.  This collection currently measures ~ 7.5 x 3.2 x 10.7 cm in dimensions. There is no abnormal post-contrast enhancement of this collection.  The possibly of  an infected seroma is considered unlikely however correlation with clinical findings is recommended.  2. Smaller seroma/liquefying hematoma collection within the right psoas muscle also noted measuring 0.9 cm in maximal dimension. 3. Multiple small cystic foci along the pancreas with mild prominence of the pancreatic duct.  The possibility of IPMN cannot be excluded.         Cultures:  Reviewed,      Assessment and Plan:     1.    Post operative infection: with L Psoas Abscess in the setting of recent L2-L3 surgery on 1/18/24.  - Possible infected hematomas, S/P IR drainage on 2/09/24, GS with 3+ WBC, Cul are NGTD  - Wound Cul with Kleb Pneumo ( ESBL), No WBC on GS though,   - Mri with L Psoas muscle collection with no enhancement,   - MANOJ in place with serous drainage,    - On IV Meropenem, pharm to dose,      2.    Hx of recent left L2-L3 lateral interbody fusion and PSF of L2-L3 with SHYANNE of L3-L5 and reinstrumentation back on 1/18/2024   - original surgery in 2004, Noted Ortho spine input,   - Conservative management so far,      3.    HX of Sz disorder: Sz free for many years,   - ? Post concussion, from 2361-9244,   - Will need Neuro input to assess the risk for break through Sz while on IV Meropenem, ? Adding antisz medicine,      4.    Disposition: in house, An elderly female with post operative seroma/ Hematoma, with possible secondary bacterial infection, although MRI with no post contrast enhancement and Cul of drainage is NGTD, ? Post abx,   - Will continue IV Meropenem, pharm to dose,   - Will need PICC line and IV abx as OP,  - Neuro input for decision to start anti sz while on Meropenem,   - Pain management per PCP,        IR:    73 year-old femaie with left psoas abscess s/p drain placement   - Continue current drain management, 37ml output over 24hr  - CT Abscessogram when output < 10 cc/day  - Consider drain removal if output is < 10 cc/day x 2 days and no fistulous communication.    MEDICATIONS ADMINISTERED IN LAST 1 DAY:    cyclobenzaprine (Flexeril) tab 10 mg       Date Action Dose Route User     2/13/2024 0809 Given 10 mg Oral Rossana Fitzgerald RN    2/12/2024 2046 Given 10 mg Oral Leia Soto RN          HYDROcodone-acetaminophen (Norco)  MG per tab 1 tablet       Date Action Dose Route User    2/13/2024 0852 Given 1 tablet Oral Rossana Fitzgerald RN          ketorolac (Toradol) 15 MG/ML injection 15 mg       Date Action Dose Route User    2/12/2024 2015 Given 15 mg Intravenous Leia Soto RN          meropenem (Merrem) 500 mg in sodium chloride 0.9% 100 mL IVPB-MBP       Date Action Dose Route User    2/13/2024 0853 New Bag 500 mg Intravenous Rossana Fitzgerald RN    2/13/2024 0100 New Bag 500 mg Intravenous Leia Soto RN    2/12/2024 1700 New Bag 500 mg Intravenous Sara Monae RN          Vitals (last day)       Date/Time Temp Pulse Resp BP SpO2 Weight O2 Device O2 Flow Rate (L/min) [facility]    02/13/24 1558 98.9 °F (37.2 °C) -- -- -- -- -- -- --     02/13/24 1557 99.3 °F (37.4 °C) 102 18 133/60 92 % -- None (Room air) --     02/13/24 1157 98.6 °F (37 °C) 99 18 164/72 91 % -- None (Room air) --     02/13/24 0800 98.6 °F (37 °C) 101 16 126/51 94 % -- None (Room air) --     02/13/24 0352 97.7 °F (36.5 °C) 91 17 156/69 98 % -- Nasal cannula 2 L/min     02/12/24 2341 98.4 °F (36.9 °C) 92 18 123/47 96 % -- Nasal cannula 2 L/min     02/12/24 1946 98.9 °F (37.2 °C) 94 17 141/55 94 % -- None (Room air) 0 L/min     02/12/24 1614 98.7 °F (37.1 °C) 85 18 121/53 94 % -- None (Room air) --     02/12/24 1600 -- 85 -- -- -- -- -- -- CM    02/12/24 1224 98.4 °F (36.9 °C) 87 20 134/48 95 % -- Nasal cannula 2 L/min     02/12/24 0811 98.6 °F (37 °C) 93 20 140/51 96 % -- Nasal cannula 3 L/min     02/12/24 0406 98.4 °F (36.9 °C) 89 18 126/58 95 % -- Nasal cannula 3 L/min KL

## 2024-02-13 NOTE — CM/SW NOTE
Met with pt to discuss DC planning.  Reviewed anticipated need for IV abx and that pt may likely go home with MANOJ drain.  Pt has been limited in her mobility due to pain, however therapy continuing to recommend home with [facility].  Pt confirmed that she would prefer to discharge to home and would like to resume [facility].  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  Pt stated that she has been frustrated by her experiences with pain, but feels she is doing better and is actually hopeful for DC today.  No other DC needs/concerns identified by patient at this time.    Referral sent to [employer] via [employer] to confirm insurance coverage for home infusion.  Line and final orders pending.  Updated [facility].  Updated RN.  / to remain available for support and/or discharge planning.     Laura Stout [facility]  Discharge Planner  [phone]

## 2024-02-13 NOTE — PLAN OF CARE
Pt A&Ox4 vital signs stable on 1L O2 NC. Mild c/o pain, managed with PRN medication. Tele (NSR), , SCD's, IS encouraged, Tolerating diet. Incision sites C/D/I. MANOJ drain to suction, intact. Tolerating IV abx. Seizure precautions in place. POC discussed, pt verbalized understanding. No further questions at this time. Call light within reach.

## 2024-02-13 NOTE — PROGRESS NOTES
Brief Spine Surgery Progress Note:    S: Patient resting in bed, states she is still in some pain - although significantly improved from when it was at its worst.  Currently on antibiotics and pain medication.  Drain in place and functioning. No radicular symptoms        O:  Vitals:    02/12/24 1946   BP: 141/55   Pulse: 94   Resp: 17   Temp: 98.9 °F (37.2 °C)       Drain in place  Incision healing accordingly - staples in place  NVI BLE      A&P:  73F s/p Prone lateral L2-3 fusion now with psoas abscess s/p IR aspiration and drain placement       WBAT  No surgical intervention needed at this time  Continue with drain and Anti-biotics  ID following    I had a very long conversation with the patient that it is imperative for her care that she get up and ambulate as much as possible     Likely will DC staples tomorrow         Sharon Gomez MD   [facility]im[facility] Invasive [facility] Complex Spine Surgery Specialisty  [facility]

## 2024-02-13 NOTE — PROGRESS NOTES
Name:Gloria Mccrary  MRN#:[id number]  :1950      Subjective:  Lower anterior abdominal pain    Objective:  Left Psoas muscle drain site is CDI and non tender to palpation    Vital Signs:  Blood pressure 126/51, pulse 101, temperature 98.6 °F (37 °C), temperature source Oral, resp. rate 16, height 63\", weight 190 lb, SpO2 94%, not currently breastfeeding.    Input/Output:    Intake/Output Summary (Last 24 hours) at 2024 1008  Last data filed at 2024 0600  Gross per 24 hour   Intake 740 ml   Output 383 ml   Net 357 ml     24 hour drain output: 33cc    Labs:       Microbiology:  3+ WBCs.  No growth x 3 days.     Assessment/Plan:  72 yo with recent L2-3 surgery.  Post op left psoas fluid collection noted.  Drain placed on 24. Still draining well.  Cultures are negative thus far. When drainage is <10cc/day x 2 days then abscessogram.     Pillo Aguiar MD  2024  10:08 AM

## 2024-02-13 NOTE — PROGRESS NOTES
[facility]     [facility] Consult    Gloria Mccrary Patient Status:  Inpatient    1950 MRN [id number]   Location [facility] 3SW-A Attending Winsome Hutton, *   Hosp Day # 6 PCP MD Gloria Isaacs seen and examined,   Afebrile,   Previous entries noted,   Has pain in L flank area,   Pain fluctuates,   MANOJ in place,       History:  Past Medical History:   Diagnosis Date    ASTHMA     Asthma     Back problem     Calculus of kidney     Complex partial seizure disorder (HCC)     DEPRESSION     Disorder of thyroid     High blood pressure     High cholesterol     History of blood transfusion     HYPERLIPIDEMIA     HYPERTENSION     Macular degeneration     Muscle weakness     NAVEEN (obstructive sleep apnea) [facility] 7-6-10     AHI 17  RDI 20  REM AHI 34  YULY 76%    OSTEOARTHRITIS     Osteoarthritis     Seizure disorder (HCC)     last seizure was 2018    Sleep apnea     no device     Past Surgical History:   Procedure Laterality Date    BENIGN BIOPSY LEFT      15-20 years ago    CATARACT      COLONOSCOPY      COLONOSCOPY,BIOPSY  2007    Performed by MARY JO RUSH at [facility], [facility]    HYSTERECTOMY      KNEE REPLACEMENT SURGERY      Right 2010    KNEE REPLACEMENT SURGERY Left 2017    Total    LUMBAR SPINE FUSION,ANTER APPRCH      OTHER SURGICAL HISTORY      Lumbar 3-4 ingrid laminectomy. microdicectomy     Family History   Problem Relation Age of Onset    Hypertension Mother     Diabetes Mother     Breast Cancer Other 60        mat aunt    Breast Cancer Maternal Grandmother 40        Age at dx 40s      reports that she quit smoking about 34 years ago. Her smoking use included cigarettes. She has a 15 pack-year smoking history. She has never used smokeless tobacco. She reports that she does not drink alcohol and does not use drugs.    Allergies:  Allergies   Allergen Reactions    Meloxicam CONFUSION       Medications:    Current  Facility-Administered Medications:     potassium chloride (K-Dur) tab 40 mEq, 40 mEq, Oral, Once    HYDROcodone-acetaminophen (Norco)  MG per tab 1 tablet, 1 tablet, Oral, Q6H PRN **OR** HYDROcodone-acetaminophen (Norco)  MG per tab 2 tablet, 2 tablet, Oral, Q6H PRN    multivitamin (Centrum) chewable tab (Adult) 1 tablet, 1 tablet, Oral, Daily    meropenem (Merrem) 500 mg in sodium chloride 0.9% 100 mL IVPB-MBP, 500 mg, Intravenous, Q8H    diazePAM (Valium) tab 5 mg, 5 mg, Oral, Q8H PRN    bisacodyl (Dulcolax) 10 MG rectal suppository 10 mg, 10 mg, Rectal, Daily PRN    losartan (Cozaar) tab 100 mg, 100 mg, Oral, Daily    amLODIPine (Norvasc) tab 10 mg, 10 mg, Oral, Daily    atorvastatin (Lipitor) tab 40 mg, 40 mg, Oral, QAM    docusate sodium (Colace) cap 100 mg, 100 mg, Oral, BID    DULoxetine (Cymbalta) DR cap 20 mg, 20 mg, Oral, Daily    levothyroxine (Synthroid) tab 75 mcg, 75 mcg, Oral, Daily @ 0700    metoprolol succinate ER (Toprol XL) 24 hr tab 25 mg, 25 mg, Oral, Daily    acetaminophen (Tylenol Extra Strength) tab 500 mg, 500 mg, Oral, Q4H PRN    polyethylene glycol (PEG 3350) (Miralax) 17 g oral packet 17 g, 17 g, Oral, Daily PRN    sennosides (Senokot) tab 17.2 mg, 17.2 mg, Oral, Nightly PRN    ondansetron (Zofran) 4 MG/2ML injection 4 mg, 4 mg, Intravenous, Q6H PRN    metoclopramide (Reglan) 5 mg/mL injection 5 mg, 5 mg, Intravenous, Q8H PRN    cyclobenzaprine (Flexeril) tab 10 mg, 10 mg, Oral, TID PRN    Review of Systems:   Constitutional: Negative for anorexia, chills, fatigue, fevers, malaise, night sweats and weight loss.  Eyes: Negative for visual disturbance, irritation and redness.  Ears, nose, mouth, throat, and face: Negative for hearing loss, tinnitus, nasal congestion, snoring, sore throat, hoarseness and voice change.  Respiratory: Negative for cough, sputum, hemoptysis, chest pain, wheezing, dyspnea on exertion, or stridor.  Cardiovascular: Negative for chest pain, palpitations,  irregular heart beats, syncope, fatigue, orthopnea, paroxysmal nocturnal dyspnea, lower extremity edema.  Gastrointestinal: Negative for dysphagia, odynophagia, reflux symptoms,   Integument/breast: Negative for rash, skin lesions, and pruritus.  Hematologic/lymphatic: Negative for easy bruising, bleeding, and lymphadenopathy.  Musculoskeletal: Negative for myalgias, arthralgias, muscle weakness.  Neurological: Negative for headaches, dizziness, seizures, memory problems, trouble swallowing, speech problems, gait problems and weakness.  Behavioral/Psych: Negative for active tobacco use.  Endocrine: No history of of diabetes, thyroid disorder.  All other review of systems are negative.    Vital signs in last 24 hours:  Patient Vitals for the past 24 hrs:   BP Temp Temp src Pulse Resp SpO2   02/12/24 1224 134/48 98.4 °F (36.9 °C) Tympanic 87 20 95 %   02/12/24 0811 140/51 98.6 °F (37 °C) Oral 93 20 96 %   02/12/24 0406 126/58 98.4 °F (36.9 °C) Oral 89 18 95 %   02/11/24 2343 132/60 98.6 °F (37 °C) Oral 87 17 97 %   02/11/24 2010 130/54 98.6 °F (37 °C) Oral 95 17 94 %   02/11/24 1400 145/76 98.1 °F (36.7 °C) Oral 92 18 95 %       Physical Exam:   General: alert, cooperative, oriented.  No respiratory distress.   Head: Normocephalic, without obvious abnormality, atraumatic.   Eyes: Conjunctivae/corneas clear.     Nose: Nares normal.   Throat: Lips, mucosa, and tongue normal.  No thrush noted.   Neck: Soft, supple neck; trachea midline,    Lungs: CTAB, normal and equal bilateral chest rise   Chest wall: No tenderness or deformity.   Heart: Regular rate and rhythm, normal S1S2, no murmur.   Abdomen: soft, non-tender,positive BS.   Extremity: no edema, no cyanosis   Skin: No rashes or lesions.   Neurological: Alert, interactive, no focal deficits    Labs:         Radiology:  Mri- 1. As noted on recent CT imaging, patient is status post recent lumbar fusion with complex fluid collection noted along the left psoas muscle  that appears to have slightly increased in size from prior imaging and is noted to have internal fluid/fluid level suggesting a liquefying hematoma.  This collection currently measures ~ 7.5 x 3.2 x 10.7 cm in dimensions. There is no abnormal post-contrast enhancement of this collection.  The possibly of  an infected seroma is considered unlikely however correlation with clinical findings is recommended. 2. Smaller seroma/liquefying hematoma collection within the right psoas muscle also noted measuring 0.9 cm in maximal dimension. 3. Multiple small cystic foci along the pancreas with mild prominence of the pancreatic duct.  The possibility of IPMN cannot be excluded.       Cultures:  Reviewed,     Assessment and Plan:    1.    Post operative infection: with L Psoas Abscess in the setting of recent L2-L3 surgery on 1/18/24.  - Possible infected hematomas, S/P IR drainage on 2/09/24, GS with 3+ WBC, Cul are NGTD  - Wound Cul with Kleb Pneumo ( ESBL), No WBC on GS though,   - Mri with L Psoas muscle collection with no enhancement, drainage in MANOJ is clear too,   - Staples removed per NS,   - Pain is in L flank area and is now radiating to the front, ? T 10 level, nerve pain, ? Relationship with Psoas abscess,   - On IV Meropenem, pharm to dose,     2.    Hx of recent left L2-L3 lateral interbody fusion and PSF of L2-L3 with SHYANNE of L3-L5 and reinstrumentation back on 1/18/2024   - original surgery in 2004, Noted Ortho spine input,   - Conservative management so far,     3.    HX of Sz disorder: Sz free for many years,   - ? Post concussion, from 2844-2941,   - Will benefit from Neuro input to assess the risk for break through Sz while on IV Meropenem, ? Adding antisz medicine,     4.    Disposition: in house, An elderly female with post operative seroma/ Hematoma, with possible secondary bacterial infection, although MRI with no post contrast enhancement and Cul of drainage is NGTD, ? Post abx,   - Will continue IV  Meropenem in house, Will discharge on IV Invanz 1 gm daily for 4-6 weeks, CBC CMP CRP weekly while on IV abx,   - PICC line,   - Neuro input for decision to start anti sz while on Meropenem,  - Pain management per PCP,     Discussed with patient, RN, all questions answered, further recommendatiosn to follow, Thanks,     Thank you for consulting [facility] ID for Gloria Mccrary.  If you have any questions or concerns please call [facility] Infectious Disease at [phone].     John Jane MD  2/12/2024  1:03 PM

## 2024-02-13 NOTE — PROGRESS NOTES
Brief Spine Surgery Progress Note:    S: Patient resting in bed. Improved since yesterday. Better after staples removed today.  Currently on antibiotics and pain medication.  Drain in place and functioning. No radicular symptoms        O:  Vitals:    02/13/24 1157   BP: (!) 164/72   Pulse: 99   Resp: 18   Temp: 98.6 °F (37 °C)       Drain in place  Incision healing accordingly   NVI BLE      A&P:  73F s/p 1/18/2024 Prone lateral L2-3 fusion now with psoas abscess s/p IR aspiration and drain placement       WBAT  No surgical intervention needed at this time  Continue with drain and Anti-biotics  ID following  Staples removed and steri strips applied.     Chiqui Gomez MD   Minimally Invasive and Complex Spine Surgery Specialisty  [facility]

## 2024-02-13 NOTE — PHYSICAL THERAPY NOTE
IP PT attempted, pt laying in bed, reports pain , fatigue, was up in chair and attempted to amb earlier. Pt reports pain was too intense so she had to return to bed.

## 2024-02-13 NOTE — PROGRESS NOTES
[facility] Progress Note     Gloria Mccrary Patient Status:  Inpatient    1950 MRN [id number]   [facility] 3SW-A Attending ASHLEE Holland MD   Hosp Day # 6 PCP Gordo Antonio MD     Chief Complaint: f/u L abdominal pain    Subjective:   S:     She is awake.  On 2L NC.  Afebrile.        Objective:   Vital signs:  Temp:  [97.7 °F (36.5 °C)-98.9 °F (37.2 °C)] 98.6 °F (37 °C)  Pulse:  [] 99  Resp:  [16-18] 18  BP: (121-164)/(47-72) 164/72  SpO2:  [91 %-98 %] 91 %    Wt Readings from Last 6 Encounters:   24 190 lb (86.2 kg)   24 212 lb (96.2 kg)   23 205 lb (93 kg)   22 206 lb 6.4 oz (93.6 kg)   21 195 lb (88.5 kg)   21 210 lb (95.3 kg)         Physical Exam:    Gen: No acute distress, drowsy but arousable to voice- closes eyes to answer questions. no accessory m use, AxOx3  Pulm: Lungs clear, ok respiratory effort  CV: Heart with regular rate and rhythm, no edema  Abd: Abdomen soft, nontender, nondistended, bowel sounds present, no peritoneal signs  MSK:   no clubbing, no cyanosis  Skin: no visible rashes    Psych:   calm memory ok    Results:   Diagnostic Data:      Labs:       Recent Labs   Lab 24  1038 24  0452 24  0437 24  1532 24  0500 24  0528   WBC 18.2* 18.4* 18.5*  --  11.2* 10.0   HGB 11.9* 11.5* 12.3  --  11.0* 11.4*   MCV 97.7 98.1 97.0  --  100.8* 99.2   .0* 484.0* 497.0*  --  399.0 433.0   INR 1.21*  --   --  1.21*  --   --        Recent Labs   Lab 24  1038 24  0452 24  0437 24  0500 24  0528    139 137 139  --    K 4.1 4.2 4.0 3.8 4.3    106 103 106  --    CO2 29.0 26.0 30.0 29.0  --    BUN 29* 21 24* 26*  --    CREATSERUM 1.16* 0.97 0.88 0.73  --    CA 9.5 8.9 9.3 9.0  --    MG  --  2.3 2.4  --   --    * 91 136* 104*  --        Recent Labs   Lab 24  1038   ALT 26   AST 15   ALB 2.7*        COVID-19  Lab Results    Component Value Date    COVID19 Not Detected 02/17/2023    COVID19 NOT DETECTED 11/14/2020    COVID19 NOT DETECTED 10/31/2020       Medications:    potassium chloride  40 mEq Oral Once    multivitamin  1 tablet Oral Daily    meropenem  500 mg Intravenous Q8H    losartan  100 mg Oral Daily    amLODIPine  10 mg Oral Daily    atorvastatin  40 mg Oral QAM    docusate sodium  100 mg Oral BID    DULoxetine  20 mg Oral Daily    levothyroxine  75 mcg Oral Daily @ 0700    metoprolol succinate ER  25 mg Oral Daily         HYDROcodone-acetaminophen **OR** HYDROcodone-acetaminophen, diazePAM, bisacodyl, acetaminophen, polyethylene glycol (PEG 3350), sennosides, ondansetron, metoclopramide, cyclobenzaprine          ASSESSMENT / PLAN:      73 year old female with mmp including but not limited to HTN/HL, asthma, seizure disorder, OA, MDD in partial remission is admitted for L sided abdominal pain.     **drowsiness, AMS- suspect secondary to IV dilaudid- RESOLVED  -minimize narcotics, d/c'ed higher doses of dilaudid  -monitor closely     **L sided abdominal pain secondary to  **loculated fluid collections around psoas muscle, abscess sp IR drainage of 53 ml of pus  -ID, spine on, appreciate. Seen by gen surg, appreciate  -BC, incision drainage cx with ESBL. Pct minimally elevated.     -ID consult, appreciate-on meropenem   - will need PICC and IV abx for home     **constipation as noted on CT a/p. Bowel regimen    **hypoxia- suspect splinting from pain and narcotic effect. Asymptomatic. IS as able. Monitor- RESOLVED     **leukocytosis-suspect reactive     Stable chronic illnesses:  HTN/HL-home meds. Arb resume  Seizure disorder-not on meds  MDD in partial remission-home med     PPx-scds for now      Spoke w pt at length.  She stated that I did not need to call her sister.      Zafar [facility] Hospitalist  Internal Medicine  Answering Service number: [phone]

## 2024-02-14 PROCEDURE — 02HV33Z INSERTION OF INFUSION DEVICE INTO SUPERIOR VENA CAVA, PERCUTANEOUS APPROACH: ICD-10-PCS | Performed by: HOSPITALIST

## 2024-02-14 PROCEDURE — 36569 INSJ PICC 5 YR+ W/O IMAGING: CPT

## 2024-02-14 PROCEDURE — B548ZZA ULTRASONOGRAPHY OF SUPERIOR VENA CAVA, GUIDANCE: ICD-10-PCS | Performed by: HOSPITALIST

## 2024-02-14 RX ORDER — LIDOCAINE HYDROCHLORIDE 10 MG/ML
5 INJECTION, SOLUTION EPIDURAL; INFILTRATION; INTRACAUDAL; PERINEURAL
Status: COMPLETED | OUTPATIENT
Start: 2024-02-14 | End: 2024-02-14

## 2024-02-14 RX ORDER — HYDROCODONE BITARTRATE AND ACETAMINOPHEN 10; 325 MG/1; MG/1
1 TABLET ORAL EVERY 6 HOURS PRN
Qty: 30 TABLET | Refills: 0 | Status: SHIPPED | OUTPATIENT
Start: 2024-02-14

## 2024-02-14 RX ORDER — CYCLOBENZAPRINE HCL 10 MG
10 TABLET ORAL 3 TIMES DAILY PRN
Qty: 30 TABLET | Refills: 0 | Status: SHIPPED | OUTPATIENT
Start: 2024-02-14

## 2024-02-14 NOTE — PROGRESS NOTES
Pt ready for discharge today. Page sent to Arturo PORTILLO w/ ID to  please place and order for pt to have a dose of invanz here prior to discharge home. Order has been placed for discharge, but still need the one-time inpt dose ordered.

## 2024-02-14 NOTE — PLAN OF CARE
Assumed pt care at 1930  Pt is A&Ox4, following commands.   Pt on room air, VSS.  NSR  Pt denies pain.  Pt one person assist.  Pt on regular diet. Tolerating diet.   L forearm saline locked  Seizure precautions  Cardiac electrolyte protocol  PICC placement tomorrow   Bed in lowest position, call light in reach.     Problem: Patient/Family Goals  Goal: Patient/Family Long Term Goal  Description: Patient's Long Term Goal: maintain healthy lifestyle    Interventions:  - Diet  - Exercise  - Self care  - See additional Care Plan goals for specific interventions  Outcome: Progressing  Goal: Patient/Family Short Term Goal  Description: Patient's Short Term Goal: Be able to walk    Interventions:   - PT/OT  - Surgery reccs  - Pain control  - See additional Care Plan goals for specific interventions  Outcome: Progressing     Problem: PAIN - ADULT  Goal: Verbalizes/displays adequate comfort level or patient's stated pain goal  Description: INTERVENTIONS:  - Encourage pt to monitor pain and request assistance  - Assess pain using appropriate pain scale  - Administer analgesics based on type and severity of pain and evaluate response  - Implement non-pharmacological measures as appropriate and evaluate response  - Consider cultural and social influences on pain and pain management  - Manage/alleviate anxiety  - Utilize distraction and/or relaxation techniques  - Monitor for opioid side effects  - Notify MD/LIP if interventions unsuccessful or patient reports new pain  - Anticipate increased pain with activity and pre-medicate as appropriate  Outcome: Progressing     Problem: SAFETY ADULT - FALL  Goal: Free from fall injury  Description: INTERVENTIONS:  - Assess pt frequently for physical needs  - Identify cognitive and physical deficits and behaviors that affect risk of falls.  - Cherry fall precautions as indicated by assessment.  - Educate pt/family on patient safety including physical limitations  - Instruct pt to call  for assistance with activity based on assessment  - Modify environment to reduce risk of injury  - Provide assistive devices as appropriate  - Consider OT/PT consult to assist with strengthening/mobility  - Encourage toileting schedule  Outcome: Progressing     Problem: Delirium  Goal: Minimize duration of delirium  Description: Interventions:  - Encourage use of hearing aids, eye glasses  - Promote highest level of mobility daily  - Provide frequent reorientation  - Promote wakefulness i.e. lights on, blinds open  - Promote sleep, encourage patient's normal rest cycle i.e. lights off, TV off, minimize noise and interruptions  - Encourage family to assist in orientation and promotion of home routines  Outcome: Progressing

## 2024-02-14 NOTE — CM/SW NOTE
PICC pending.  Updated [facility] and [facility] of anticipated DC today pending PICC and medical clearance.  / to remain available for support and/or discharge planning.     Laura Stout [facility]  Discharge Planner  [phone]

## 2024-02-14 NOTE — PROGRESS NOTES
AVS reviewed, drain teaching done by Albert RITCHIE, Picc line discussed, explained will need to have dressing changed in am by  RN, supplies given,follow- up appts noted ,will dc home soon w/ [facility], Scripts for Norco & Flexeril given to her-declined to have them filled here-verbalized understanding of discharge instructions.

## 2024-02-14 NOTE — PROGRESS NOTES
[facility]  Progress Note      Gloria Mccrary Patient Status:  Inpatient    1950 MRN [id number]   Location [facility] 3SW-A Attending Edi Summers MD   Hosp Day # 7 PCP Gordo Antonio MD     73 year-old with left iliopsoas fluid collection s/p drain placement    -Drain outputs trending downward  - Continue current drain management  - CT Abscessogram when output < 10 cc/day  - Consider drain removal if output is < 10 cc/day x 2 days and no fistulous communication.    Rubén Lester MD  2024  5:35 PM

## 2024-02-14 NOTE — DISCHARGE SUMMARY
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  For all other patients, please follow usual protocol for discharge care transition.    Secondary Diagnoses: None    Risk of readmission: Gloria Mccrary has Moderate Risk of readmission after discharge from the hospital.    Discharge Condition: stable    Disposition: home    Important Follow up:  - PCP within   - Consults:     No follow-up provider specified.  - Labs: none  - Radiology: none    Hospital Course:      73 year old female with mmp including but not limited to HTN/HL, asthma, seizure disorder, OA, MDD in partial remission is admitted for L sided abdominal pain.      **drowsiness, AMS- suspect secondary to IV dilaudid- RESOLVED  -minimize narcotics, d/c'ed higher doses of dilaudid  -monitor closely     **L sided abdominal pain secondary to  **loculated fluid collections around psoas muscle, abscess sp IR drainage of 53 ml of pus  -ID, spine on, appreciate. Seen by gen surg, appreciate  -BC, incision drainage cx with ESBL. Pct minimally elevated.     -ID consult, appreciate-on meropenem                 - sp  PICC and IV abx for home  - dw neuro no anti seziure proph required w cabapenem       **constipation as noted on CT a/p. Bowel regimen     **hypoxia- suspect splinting from pain and narcotic effect. Asymptomatic. IS as able.  Monitor- RESOLVED     **leukocytosis-suspect reactive     Stable chronic illnesses:  HTN/HL-home meds. Arb resume  Seizure disorder-not on meds  MDD in partial remission-home med     PPx-scds for now      Consults: IP CONSULT TO ORTHOPEDIC SURGERY  IP CONSULT TO GENERAL SURGERY  NURSING CONSULT TO DIETITIAN  IP CONSULT TO SOCIAL WORK  IP CONSULT TO INFECTIOUS DISEASE  IP CONSULT TO VASCULAR ACCESS TEAM  IP CONSULT TO VASCULAR ACCESS TEAM  IP CONSULT TO SOCIAL WORK  IP CONSULT TO SOCIAL WORK    Operative Procedures:        Patient instructions:      Current Discharge Medication List        START taking these medications    Details   cyclobenzaprine 10 MG Oral Tab Take 1 tablet (10 mg total) by mouth 3 (three) times daily as needed for Muscle spasms.      HYDROcodone-acetaminophen  MG Oral Tab Take 1 tablet by mouth every 6 (six) hours as needed.      ertapenem 1 g Injection Recon Soln Inject 1 g into the vein daily for 24 days. Invanz 1 gm IVPB daily for four weeks, CBC CMP CRP weekly while on IV abx           CONTINUE these medications which have NOT CHANGED    Details   metoprolol succinate ER 25 MG Oral Tablet 24 Hr Take 1 tablet (25 mg total) by mouth daily.      amLODIPine 10 MG Oral Tab Take 1 tablet (10 mg total) by mouth daily.      atorvastatin 40 MG Oral Tab Take 1 tablet (40 mg total) by mouth every morning.      LOSARTAN 100 MG Oral Tab TAKE 1 TABLET BY MOUTH  DAILY      DULoxetine HCl 20 MG Oral Cap DR Particles Take 1 capsule (20 mg total) by mouth daily.      Levothyroxine Sodium 75 MCG Oral Tab Take 1 tablet (75 mcg total) by mouth daily.      acetaminophen 325 MG Oral Tab Take 2 tablets (650 mg total) by mouth every 6 (six) hours as needed for Pain.      docusate sodium 100 MG Oral Cap Take 100 mg by mouth 2 (two) times daily.      polyethylene glycol, PEG 3350, 17 g Oral Powd Pack Take 17 g by mouth daily as needed.      fluticasone-salmeterol 250-50 MCG/DOSE Inhalation Aerosol Powder, Breath  Activated Inhale 1 puff into the lungs 2 (two) times daily.      Multiple Vitamins-Minerals (OCUTABS-LUTEIN) Oral Tab Take 1 tablet by mouth daily.           STOP taking these medications       diphenhydrAMINE (BENADRYL ALLERGY) 25 MG Oral Cap        tiZANidine 2 MG Oral Tab        acetaminophen-codeine 300-30 MG Oral Tab              I PERSONALLY RECONCILED CURRENT AND DISCHARGE MEDICATIONS ON DAY OF DISCHARGE      Activity: activity as tolerated  Diet: cardiac diet  Wound Care: as directed  Code Status: No Order      Exam on day of discharge:     Vitals:    02/14/24 0851   BP: 154/65   Pulse: 86   Resp: 18   Temp: 98.3 °F (36.8 °C)       General: no acute distress, alert and oriented x 3  Heart: RRR  Lungs: clear bilaterally, no active wheezing  Abdomen: nontender, nondistended, intact BS  Extremities: no pedal edema   Neuro: CN inact, no focal deficits      Total time coordinating care for discharge: Greater than 30 minutes    .Zafar Summers  [facility] Hospitalist  [phone]

## 2024-02-14 NOTE — DIETARY NOTE
[facility]    NUTRITION ASSESSMENT    Unable to diagnose malnutrition criteria at this time.    NUTRITION INTERVENTION:    Meal and Snacks - Monitor and encourage adequate PO intake.   Medical Food Supplements -  Ensure Plus High Protein at lunch and Magic Cup at dinner .   Vitamin and Mineral Supplements - added Chewable MVI    PATIENT STATUS:     2/14- Pt working with other disciplines. Per RN/PCT report pt w/ fair appetite. Consumed 50% of breakfast this AM. Noted previous recorded PO intakes:100% most meals. Unable to confirm how many ONS pt is able to consume per day at this time.  Abscess found around psoas muscle. S/p IR drainage and drain placement on 2/9. Plans for possible discharge home today with IV abx.    2/9/24- 72 y/o female admitted with acute abdominal pain. Pt seen due to nutrition consult for at risk and MST score of 2. Pt falling in and out of sleep during visit. Unable to answer questions about appetite/PO intake/or wt hx. Per RN report pt with poor appetite. Consumed ~25% of omelette and 75% of muffin for breakfast. Will add ONS to help maximize nutrition intakes. Pt noted to have retroperitoneal fluid collection. Surgery and Orthopedics following. S/p abdominal MRI. Pt's symptoms do not appear to be coming from the spine or an infection per Orthopedic note (2/8). Will continue to monitor.   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       ANTHROPOMETRICS:  Ht: 160 cm (5' 3\")  Wt: 86.2 kg (190 lb).   BMI: Body mass index is 33.66 kg/m².  IBW: 52.3 kg      WEIGHT HISTORY:     Per EMR hx, pt may have lost about 22 lb (10.4%) in about 3 weeks? Pt unable to provide wt hx at this time.    Noted wt range of 190 lb-212 lb over the past 4 years.    Wt Readings from Last 10 Encounters:   02/07/24 86.2 kg (190 lb)   01/18/24 96.2 kg (212 lb)   02/17/23 93 kg (205 lb)    01/21/22 93.6 kg (206 lb 6.4 oz)   12/20/21 88.5 kg (195 lb)   11/19/21 95.3 kg (210 lb)   07/26/21 94.3 kg (208 lb)   12/16/20 96.1 kg (211 lb 12.8 oz)   11/18/20 88.9 kg (196 lb)   11/04/20 90.7 kg (200 lb)        NUTRITION:  Diet:       Procedures    Regular/General diet Is Patient on Accuchecks? No      Food Allergies: No  Cultural/Ethnic/Sikhism Preferences Addressed: Yes    Percent Meals Eaten (last 3 days)       Date/Time Percent Meals Eaten (%)    02/11/24 1400 100 %    02/12/24 0406 100 %    02/12/24 0748 100 %    02/12/24 1416 100 %    02/12/24 1859 80 %    02/13/24 1100 100 %    02/13/24 1400 100 %     Percent Meals Eaten (%): PTs son brought lunch at 02/13/24 1400    02/14/24 1045 50 %            GI system review:  Last BM:2/12    Skin and wounds: surgical back incisions, s/p lumbar surgery on 1/18/24. Drain to L back on 2/9. No pressure ulcers per RN documentation.    NUTRITION RELATED PHYSICAL FINDINGS:     1. Body Fat/Muscle Mass: no wasting noted     2. Fluid Accumulation:  edema of B/L feet      NUTRITION PRESCRIPTION: 52.3 kg (IBW)  Calories: 9211-8357 calories/day (30-35 kcal/kg)  Protein:  grams protein/day (1.5-2.0 grams protein per kg)  Fluid: ~1 ml/kcal or per MD discretion    NUTRITION DIAGNOSIS/PROBLEM:  Inadequate energy intake related to physiological causes as evidenced by documented/reported insufficient oral intake      MONITOR AND EVALUATE/NUTRITION GOALS:  PO intake of 75% of meals TID - Met, continues  PO intake of 75% of oral nutrition supplement/s - Ongoing      MEDICATIONS:  Colace, IV abx, MVI, Norco, Miralax    LABS:  (2/11):Glu:104    Pt is at Moderate nutrition risk    Carissa Schwartz MS, RD, LDN  Clinical Dietitian  Ext:[phone]

## 2024-02-14 NOTE — PLAN OF CARE
Pt A&O. On room air. Scds to BLE. Tolerating regular diet with fair appetite. Supplements bid. Last BM 2/12/24. Miralax given this AM. Back surgical incision w/ steri strips. Drain flushed and irrigated as ordered. Reviewed drain care with pt. Additional flushes, alcohol caps, tubing caps sent w/ pt. Tolerating IV atbx as ordered. Atbx changed today. PICC placed today as well. Refused therapy today and refused to get out of bed. Purewick in place. Pt cleared for dc home with [facility] today. Pt's spouse, Jeremy, to pick pt up for discharge.

## 2024-02-14 NOTE — PROGRESS NOTES
[facility] Infectious Disease  Progress Note    Gloria Mccrary Patient Status:  Inpatient    1950 MRN [id number]   Location [facility] 3SW-A Attending Edi Summers MD   Hosp Day # 7 PCP Gordo Antonio MD     Subjective:  Patient seen and examined in bed. Reports feeling better today. No acute overnight events. Tolerating IV antibiotics. Drain remains in place. Denies any pain. Denies F/C. Otherwise no new complaints.     Objective:  Blood pressure 154/65, pulse 86, temperature 98.3 °F (36.8 °C), temperature source Oral, resp. rate 18, height 5' 3\" (1.6 m), weight 190 lb (86.2 kg), SpO2 91%, not currently breastfeeding.    Intake/Output:    Intake/Output Summary (Last 24 hours) at 2024 0909  Last data filed at 2024 0500  Gross per 24 hour   Intake 250 ml   Output 1108 ml   Net -858 ml       Physical Exam:  General: Awake, alert, non-tox, NAD.  HEENT:  Oropharynx clear, trachea ML.  Heart: RRR S1S2 no murmurs.  Lungs: Essentially CTA b/l, no rhonchi, rales, wheezes.  Abdomen: Soft, NT/ND.  BS present.  No organomegaly.  Extremity: No edema.  Neurological: No focal deficits.  Derm:  Warm and dry.    Lab Data Review:        Cultures:  Hospital Encounter on 24   1. Blood Culture     Status: None    Collection Time: 24 10:47 AM    Specimen: Blood,peripheral   Result Value Ref Range    Blood Culture Result No Growth 5 Days N/A   2. Aerobic Bacterial Culture     Status: Abnormal    Collection Time: 24 10:19 AM    Specimen: Wound; Other   Result Value Ref Range    Aerobic Culture Result  N/A     Mixture of organisms suggestive of normal skin aundrea    Aerobic Culture Result 2+ growth Klebsiella pneumoniae ESBL Pos (A) N/A    Aerobic Smear No WBCs seen N/A    Aerobic Smear No organisms seen N/A       Susceptibility    Klebsiella pneumoniae ESBL Pos -  (no method available)     Cefazolin >=64 Resistant      Cefepime  Resistant      Ceftazidime   Resistant      Ceftriaxone 16 Resistant      Ciprofloxacin <=0.25 Sensitive      Gentamicin <=1 Sensitive      Meropenem <=0.25 Sensitive      Levofloxacin 1 Sensitive      Trimethoprim/Sulfa <=20 Sensitive      Radiology:  CT DRAIN ABSCESS RETROPERITONEAL (CPT=49406)    Result Date: 2/9/2024  CONCLUSION:  CT-guided left iliopsoas abscess drainage was performed without complication.   LOCATION:  [facility]   Dictated by (CST): Skyler Lara MD on 2/09/2024 at 5:51 PM     Finalized by (CST): Skyler Lara MD on 2/09/2024 at 5:55 PM       MRI ABDOMEN (W+WO) (CPT=74183)    Result Date: 2/8/2024  CONCLUSION:  1. As noted on recent CT imaging, patient is status post recent lumbar fusion with complex fluid collection noted along the left psoas muscle that appears to have slightly increased in size from prior imaging and is noted to have internal fluid/fluid level suggesting a liquefying hematoma.  This collection currently measures approximately 7.5 x 3.2 x 10.7 cm in craniocaudal, AP and transverse dimensions respectively.  There is no abnormal post-contrast enhancement of this collection.  The possibly of  an infected seroma is considered unlikely however correlation with clinical findings is recommended. 2. Smaller seroma/liquefying hematoma collection within the right psoas muscle also noted measuring 0.9 cm in maximal dimension. 3. Multiple small cystic foci along the pancreas with mild prominence of the pancreatic duct.  The possibility of IPMN cannot be excluded. Simple pancreatic cysts <2 cm in asymptomatic patients can be followed with CT or MRI in 6-12 months, depending on clinical factors.  The best imaging method for further characterization (if not contraindicated) is MRI without and with intravenous gadolinium plus MRCP.  Reference:  Douglasland LL, Peter SG, Kelly PARRY et al; Managing Incidental Findings on Abdominal CT:   White Paper of the ACR Incidental Findings Committee.  J Am Edgar Radiol 2010;7:754-773.    LOCATION:  [facility]   Dictated by (CST): Nelsy Lopez DO on 2/08/2024 at 4:49 PM     Finalized by (CST): Nelsy Lopez DO on 2/08/2024 at 5:00 PM       CT ABDOMEN+PELVIS(CONTRAST ONLY)(CPT=74177)    Result Date: 2/7/2024  CONCLUSION:  1. Loculated fluid collections around the left psoas muscle are noted and most likely a resolved fluid/hemorrhage from recent surgery.  Correlation with clinical findings is necessary to exclude infected fluid collection. 2. Moderate to marked distension right colon and cecum with large amount of fecal debris consistent with constipation. 3. Luminal distention of gallbladder is noted without specific evidence of cholecystitis or calcified cholelithiasis. 4. Small left pleural effusion with dependent atelectasis left lower lobe.    LOCATION:  [facility]   Dictated by (CST): Rashad Mcguire MD on 2/07/2024 at 2:03 PM     Finalized by (CST): Rashad Mcguire MD on 2/07/2024 at 2:11 PM        Assessment and Plan:  1.  Retroperitoneal/left psoas abscess/infected hematoma  - This is in the setting of recent left L2-L3 lateral interbody fusion and PSF of L2-L3 with SHYANNE of L3-L5 re-instrumentation on 1/18/24.  - Wound cultures, 2/7, isolating ESBL Klebsiella pneumoniae.  - MRI, 2/8, with slightly increased L psoas complex fluid collection with noted internal fluid/fluid level suggestive of liquefying hematoma. No post-contrast enhancement of this collection. Smaller seroma/liquefying hematoma collection w/in the R psoas muscle.   - Now s/p IR drainage on 2/9 with pus noted.  - Cultures NGTD.  - Ortho spine following.  - IV meropenem, ongoing.    2.  History of seizure disorder  - ?Post-concussion from 2008 - 2018.  - Has reports that she has not had a seizure in many years.  - Will monitor closely given carbapenem use.  - Neuro on consult.    3.  Recs  - Continue IV meropenem while inpatient. Plan to transition to IV Invanz 1 gram daily through 3/8/24 for this process. Orders in med rec.  - PICC line  ordered -- pending placement.  - F/u WBC and fever curve.  - F/u neuro input when available.  - Drain management as per ortho/IR.  - Post-op care as per ortho.  - Supportive care as per the primary team.  - Patient stable for discharge from ID perspective pending PICC line placement, clearance from other care teams and abx set-up.  - Please draw weekly CBC with diff, CMP and CRP for duration of abx therapy and fax results to [facility] at [phone].  - F/u with ID 2 weeks after discharge or sooner if necessary.   - Discussed plan of care with nursing.  - Discussed plan of care with patient. All questions addressed and understanding verbalized.  - Further recommendations pending clinical course.    Please note that this report has been produced using speech recognition software and may contain errors related to that system including, but not limited to, errors in grammar, punctuation, and spelling, as well as words and phrases that possibly may have been recognized inappropriately.  If there are any questions or concerns, contact the dictating provider for clarification.     The 21st Century Cures Act makes medical notes like these available to patients in the interest of transparency. Please be advised this is a medical document. Medical documents are intended to carry relevant information, facts as evident, and the clinical opinion of the practitioner. The medical note is intended as peer to peer communication and may appear blunt or direct. It is written in medical language and may contain abbreviations or verbiage that are unfamiliar.     If you have any questions or concerns please call [facility] Infectious Disease at [phone].     Arturo Nguyen, APRN    2/14/2024  9:09 AM

## 2024-02-14 NOTE — PHYSICAL THERAPY NOTE
Attempted to see Pt this AM - [facility]MA Way aware of attempt.  Pt requesting pain meds and muscle relaxant prior to OOB mobility.  Pt in agreement to get up with PCT for a walk this AM.  Confirmed with PCT Lindsay - Pt up SBA with RW.      Will f/u later today if time permits, after all other patients are attempted per tentative schedule.

## 2024-02-14 NOTE — PLAN OF CARE
Post-op day 4 from MANOJ drain placement. Dressing is clean, dry, and intact. Patient alert and oriented x4. Room air at baseline with o2 via nasal cannula as needed. Continuous pulse oximeter. Telemetry monitoring. Cardiac electrolyte protocol. Regular diet with ensure @ lunch and magic cup @ dinner. MANOJ drained and aspirated per orders. Staples removed at bedside by surgical spine team. Seizure precautions in place per protocol. Neurologist consult placed. IV Merrem every 8 hours. Up with 1 assist and walker. Reviewed all tests results with patient and patients family members. Notified attending that patient would like to review plan of care moving forward due to continued left lower quadrant pain. PICC line orders placed and pending for 2/14.

## 2024-02-14 NOTE — PROGRESS NOTES
Pt discharging; Psych Liaison placed referrals in discharge summary for psychotherapy and psychiatry in-network with pt's insurance.

## 2024-02-15 ENCOUNTER — APPOINTMENT (OUTPATIENT)
Dept: CT IMAGING | Facility: HOSPITAL | Age: 74
End: 2024-02-15
Attending: HOSPITALIST
Payer: MEDICARE

## 2024-02-15 LAB
ALBUMIN SERPL-MCNC: 2.3 G/DL (ref 3.4–5)
ALBUMIN/GLOB SERPL: 0.5 {RATIO} (ref 1–2)
ALP LIVER SERPL-CCNC: 276 U/L
ALT SERPL-CCNC: 16 U/L
ANION GAP SERPL CALC-SCNC: 5 MMOL/L (ref 0–18)
AST SERPL-CCNC: 23 U/L (ref 15–37)
BILIRUB SERPL-MCNC: 0.7 MG/DL (ref 0.1–2)
BUN BLD-MCNC: 20 MG/DL (ref 9–23)
CALCIUM BLD-MCNC: 8.9 MG/DL (ref 8.5–10.1)
CHLORIDE SERPL-SCNC: 102 MMOL/L (ref 98–112)
CO2 SERPL-SCNC: 29 MMOL/L (ref 21–32)
CREAT BLD-MCNC: 0.79 MG/DL
EGFRCR SERPLBLD CKD-EPI 2021: 79 ML/MIN/1.73M2 (ref 60–?)
ERYTHROCYTE [DISTWIDTH] IN BLOOD BY AUTOMATED COUNT: 12.9 %
GLOBULIN PLAS-MCNC: 4.7 G/DL (ref 2.8–4.4)
GLUCOSE BLD-MCNC: 115 MG/DL (ref 70–99)
HCT VFR BLD AUTO: 37 %
HGB BLD-MCNC: 11.9 G/DL
MAGNESIUM SERPL-MCNC: 2.1 MG/DL (ref 1.6–2.6)
MCH RBC QN AUTO: 30.6 PG (ref 26–34)
MCHC RBC AUTO-ENTMCNC: 32.2 G/DL (ref 31–37)
MCV RBC AUTO: 95.1 FL
OSMOLALITY SERPL CALC.SUM OF ELEC: 286 MOSM/KG (ref 275–295)
PLATELET # BLD AUTO: 494 10(3)UL (ref 150–450)
POTASSIUM SERPL-SCNC: 4.1 MMOL/L (ref 3.5–5.1)
PROT SERPL-MCNC: 7 G/DL (ref 6.4–8.2)
RBC # BLD AUTO: 3.89 X10(6)UL
SODIUM SERPL-SCNC: 136 MMOL/L (ref 136–145)
WBC # BLD AUTO: 10.5 X10(3) UL (ref 4–11)

## 2024-02-15 PROCEDURE — 80053 COMPREHEN METABOLIC PANEL: CPT | Performed by: HOSPITALIST

## 2024-02-15 PROCEDURE — 74177 CT ABD & PELVIS W/CONTRAST: CPT | Performed by: HOSPITALIST

## 2024-02-15 PROCEDURE — 83735 ASSAY OF MAGNESIUM: CPT | Performed by: HOSPITALIST

## 2024-02-15 PROCEDURE — 97116 GAIT TRAINING THERAPY: CPT

## 2024-02-15 PROCEDURE — 97112 NEUROMUSCULAR REEDUCATION: CPT

## 2024-02-15 PROCEDURE — 97530 THERAPEUTIC ACTIVITIES: CPT

## 2024-02-15 PROCEDURE — 85027 COMPLETE CBC AUTOMATED: CPT | Performed by: HOSPITALIST

## 2024-02-15 NOTE — CM/SW NOTE
Patient set up for DC to home last evening with [facility] and home infusion.  Informed by RN that pt refused to leave last night.  Pt attempted to call [employer] to appeal DC but was unable to get through.    Met with pt at bedside to discuss DC planning.  Pt stated that her pain is severe and not controlled by current pain medications.  When her  arrived last night he was upset with DC plan for home and now pt would like to have pain better controlled prior to DC.  She would also like to consider [facility].      Reviewed PT notes.  Pt worked with therapy on 2/12.  At that time she ambulated 75 feet with contact guard and needed supervision for other ADLs.  Pt has declined to work with therapy since that time due to pain.  Discussed that pt is not anticipated to meet insurance criteria for [facility] based on therapy note from 2/12 and also that pt has not worked with therapy when sessions offered.  Pt also with daily IV abx and drain, but unsure if insurance would approve rehab admission based on these medical needs.      Patient's primary concern related to pain management.  SW message MD regarding pt's concerns.  Will follow for MD recommendations regarding any additional interventions available to address pt's pain and if pt is still medically appropriate for DC today.  Message sent to [facility] to request review to determine if [facility] referrals are appropriate at this time.  Medicare IM information given with appeal rights.  Confirmed pt did contact appropriate number for [employer] last evening - unclear why she was not able to initiate appeal request.  / to remain available for support and/or discharge planning.     Laura Stout, [facility]  Discharge Planner  [phone]

## 2024-02-15 NOTE — CM/SW NOTE
Sent available updates to [facility] and [facility] and notified them that pt is staying one more night in the hospital for pain management and awaiting repeat labs.     / to remain available for support and/or discharge planning.     Piedad MANUELA MSN, RN CTL/  [id number]

## 2024-02-15 NOTE — PROGRESS NOTES
CC: follow-up hospital admission psoas abscess    SUBJECTIVE:  Interval History:     State she is having worsening pain. Reports pain improved after the drain placed but now getting worse again. Left sided with radiation to the llq. Similar location as pta.   Has had bms. Occasional nausea when pain is worse  Pain is worse with movement. When she is sitting still doesn't have much pain. She is afrtaild to work with pt due to pain    OBJECTIVE:  Scheduled Meds:    potassium chloride  40 mEq Oral Once    multivitamin  1 tablet Oral Daily    losartan  100 mg Oral Daily    amLODIPine  10 mg Oral Daily    atorvastatin  40 mg Oral QAM    docusate sodium  100 mg Oral BID    DULoxetine  20 mg Oral Daily    levothyroxine  75 mcg Oral Daily @ 0700    metoprolol succinate ER  25 mg Oral Daily     Continuous Infusions:   PRN Meds: ertapenem, HYDROcodone-acetaminophen **OR** HYDROcodone-acetaminophen, diazePAM, bisacodyl, acetaminophen, polyethylene glycol (PEG 3350), sennosides, ondansetron, metoclopramide, cyclobenzaprine    PHYSICAL EXAM  Vital signs: Temp:  [97.7 °F (36.5 °C)-98.4 °F (36.9 °C)] 98.4 °F (36.9 °C)  Pulse:  [94-98] 98  Resp:  [18-20] 20  BP: (130-149)/(63-80) 149/80  SpO2:  [94 %-97 %] 96 %      GENERAL - NAD, AAO  EYES- sclera anicteric,   HENT- normocephalic, OP - dry  NECK - no JVD  CV- RRR  RESP - CTAB, normal resp effort  ABDOMEN- soft ttp in llq, no rebound or guarding  EXT- no LE edema,    PSYCH - normal mentation/ normal affect    Data Review:   Labs:   Recent Labs   Lab 02/09/24 0437 02/09/24  1532 02/11/24  0500 02/12/24  0528   WBC 18.5*  --  11.2* 10.0   HGB 12.3  --  11.0* 11.4*   MCV 97.0  --  100.8* 99.2   .0*  --  399.0 433.0   INR  --  1.21*  --   --        Recent Labs   Lab 02/09/24  0437 02/11/24  0500 02/12/24  0528    139  --    K 4.0 3.8 4.3    106  --    CO2 30.0 29.0  --    BUN 24* 26*  --    CREATSERUM 0.88 0.73  --    CA 9.3 9.0  --    MG 2.4  --   --    *  104*  --        No results for input(s): \"ALT\", \"AST\", \"ALB\", \"AMYLASE\", \"LIPASE\", \"LDH\" in the last 168 hours.    Invalid input(s): \"ALPHOS\", \"TBIL\", \"DBIL\", \"TPROT\"    No results for input(s): \"PGLU\" in the last 168 hours.        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      **drowsiness, AMS- suspect secondary to IV dilaudid- RESOLVED  -thought to be from opioids      **L sided abdominal pain secondary to  **loculated fluid collections around psoas muscle, abscess sp IR drainage of 53 ml of pus on 02/09  -ID, spine on, appreciate. Seen by gen surg, appreciate  -BC, incision drainage cx with ESBL. Pct minimally elevated.     -ID consult, appreciate-on meropenem                 - will need PICC and IV abx for home  =given worsening reported pain, will repeat CT AP. Recehck labs first   Dw pt re pain management - she is not using pain meds as much as she could- used norco only once during day yesterday      **constipation as noted on CT a/p. Bowel regimen     **hypoxia- suspect splinting from pain and narcotic effect. Asymptomatic. IS as able. Monitor- RESOLVED     **leukocytosis-suspect reactive. Repeat labs pending     Stable chronic illnesses:  HTN/HL-home meds. Arb resumed. Bp controlled  Seizure disorder-not on meds  MDD in partial remission-home med     PPx-scds for now     Dispo - hold dc for now        Will continue to follow while hospitalized. Please page me or the on-call hospitalist with questions or concerns.    Zander Barber Hospitalist  [phone]  Answering Service: [phone]

## 2024-02-15 NOTE — PROGRESS NOTES
1825: Pt called RN into room stating she is concerned about going home, stating her pain is uncontrolled. Requesting something to \"keep her comfortable for the ride home\". Reviewed chart. Pt received norco and flexeril at 1409. Offered Tylenol XS and Valium. Pt agreeable. After giving medications, pt stating that she just can't go home like this.     Page sent to Dr Summers. Awaiting response.    1830: Spoke to Dr Summers. Pt is still cleared for discharge.   Call placed to ER case management at [phone] to discuss pt's options. Pt has [facility] Medicare and has appeal rights. Case management gave this RN two phone numbers for the pt to contact them to appeal the discharge.     1835: Informed pt that she is still cleared for discharge from medical perspective, but has rights to appeal discharge. Informed pt that she has to be the one to call Medicare. RN staff unable to do this for pt. Phone numbers given to pt [phone]. Also 1-800-Medicare.    1955: Called [employer] (f. [employer]) [facility] number listed on pt's AVS. Message left regarding pt not being discharged today, as she is appealing her discharge.

## 2024-02-15 NOTE — PLAN OF CARE
Patient alert and oriented x4. Seizure and Isolation precautions in place. Right limb alert, PICC in place.  Pain controlled with PO PRN pain meds. Denies n/t. Steristrips to x2 lower back incisions in place and intact, coverlet dressings changed. Left lateral incision with steristrips and open to air. MANOJ drain to bulb suction. SCDs in place, ankle pumps encouraged, IS encouraged. Patient rolling side to side. Refusing to get up at this time. Tolerating diet, no c/o n/v. Voiding freely with purewick in place.     Plan: PT to see today, pain control, MANOJ drain management

## 2024-02-15 NOTE — PROGRESS NOTES
Given Flexeril and Norco for pain, pt states adequate relief. Afebrile, BP stable, SS to lower back surg site cdi. Plan for dc home with HH services.

## 2024-02-15 NOTE — PROGRESS NOTES
Message sent to Dr Jane re: Meropenem, ordered not to give due to pt already has the dose of Invanz.

## 2024-02-15 NOTE — PROGRESS NOTES
Brief Spine Surgery Progress Note:    S: Patient resting in bed. Improved since yesterday. Has been up walking Currently on antibiotics and pain medication.  Drain in place and functioning. No radicular symptoms        O:  Vitals:    02/15/24 0830   BP: 149/80   Pulse: 98   Resp: 20   Temp: 98.4 °F (36.9 °C)       Drain in place  Incision healing accordingly   NVI BLE  Good strength B LE.       A&P:  73F s/p 1/18/2024 Prone lateral L2-3 fusion now with psoas abscess s/p IR aspiration and drain placement       WBAT  No surgical intervention needed at this time  Continue with drain and Anti-biotics  ID following      Chiqui Hernandez PA-C  Minimally Invasive and Complex Spine Surgery Specialisty  [facility]

## 2024-02-15 NOTE — PROGRESS NOTES
[facility] Infectious Disease Progress Note    Gloria Mccrary Patient Status:  Inpatient    1950 MRN [id number]   Location [facility] 3SW-A Attending Edi Summers MD   Hosp Day # 8 PCP Gordo Antonio MD     Subjective:  Pt complains of pain with movement.     Objective:    Allergies:  Allergies   Allergen Reactions    Meloxicam CONFUSION       Medications:    Current Facility-Administered Medications:     potassium chloride (K-Dur) tab 40 mEq, 40 mEq, Oral, Once    HYDROcodone-acetaminophen (Norco)  MG per tab 1 tablet, 1 tablet, Oral, Q6H PRN **OR** HYDROcodone-acetaminophen (Norco)  MG per tab 2 tablet, 2 tablet, Oral, Q6H PRN    multivitamin (Centrum) chewable tab (Adult) 1 tablet, 1 tablet, Oral, Daily    [Held by provider] meropenem (Merrem) 500 mg in sodium chloride 0.9% 100 mL IVPB-MBP, 500 mg, Intravenous, Q8H    diazePAM (Valium) tab 5 mg, 5 mg, Oral, Q8H PRN    bisacodyl (Dulcolax) 10 MG rectal suppository 10 mg, 10 mg, Rectal, Daily PRN    losartan (Cozaar) tab 100 mg, 100 mg, Oral, Daily    amLODIPine (Norvasc) tab 10 mg, 10 mg, Oral, Daily    atorvastatin (Lipitor) tab 40 mg, 40 mg, Oral, QAM    docusate sodium (Colace) cap 100 mg, 100 mg, Oral, BID    DULoxetine (Cymbalta) DR cap 20 mg, 20 mg, Oral, Daily    levothyroxine (Synthroid) tab 75 mcg, 75 mcg, Oral, Daily @ 0700    metoprolol succinate ER (Toprol XL) 24 hr tab 25 mg, 25 mg, Oral, Daily    acetaminophen (Tylenol Extra Strength) tab 500 mg, 500 mg, Oral, Q4H PRN    polyethylene glycol (PEG 3350) (Miralax) 17 g oral packet 17 g, 17 g, Oral, Daily PRN    sennosides (Senokot) tab 17.2 mg, 17.2 mg, Oral, Nightly PRN    ondansetron (Zofran) 4 MG/2ML injection 4 mg, 4 mg, Intravenous, Q6H PRN    metoclopramide (Reglan) 5 mg/mL injection 5 mg, 5 mg, Intravenous, Q8H PRN    cyclobenzaprine (Flexeril) tab 10 mg, 10 mg, Oral, TID PRN    Physical Exam:  General: Alert, orientated x3.  Cooperative.  No  apparent distress.  Vital Signs:  Blood pressure 149/80, pulse 98, temperature 98.4 °F (36.9 °C), temperature source Oral, resp. rate 20, height 5' 3\" (1.6 m), weight 190 lb (86.2 kg), SpO2 96%, not currently breastfeeding.   Temp (24hrs), Av.1 °F (36.7 °C), Min:97.7 °F (36.5 °C), Max:98.4 °F (36.9 °C)      HEENT: Exam is unremarkable.  Without scleral icterus.  Mucous membranes are moist. PERRLA.  Oropharynx is clear.  Neck: No tenderness to palpitation.  Full range of motion to flexion and extension, lateral rotation and lateral flexion of cervical spine.  No JVD. Supple.   Lungs: Clear to auscultation bilaterally.  Cardiac: Regular rate and rhythm. No murmur.  Abdomen:  Soft, non-distended, non-tender, with no rebound or guarding.   Extremities:  No lower extremity edema noted.  Without clubbing or cyanosis.    Skin: Normal texture and turgor.  Neurologic: Cranial nerves are grossly intact.  Motor strength and sensory examination is grossly normal.  No focal neurologic deficit.    Assessment/Plan:    1.  Psoas abscess  -s/p spine surgery on 24  -admitted for worsening pain  -wound cultures with ESBL klebsiella  -MRI with L psoas fluid collection  -s/p IR drainage with drain placement, purulent drainage noted, cultures NG  -plans for abscessogram as outpatient  -on IV ertapenem  2.  Hx of seizure  -no recent episode  3.  Dispo  -restart IV meropenem while inpatient  -PICC with IV ertapenem through 3/8  -follow up in 1-2 weeks     If you have any questions or concerns please call [facility] Infectious Disease at [phone].     Marcio Vences APRN

## 2024-02-15 NOTE — PHYSICAL THERAPY NOTE
PHYSICAL THERAPY TREATMENT NOTE - INPATIENT    Room Number: 356/356-A     Session: 2     Number of Visits to Meet Established Goals: 5    Presenting Problem: abdominal pain  Co-Morbidities : HTN/HL, MDD, asthma, seizure disorder, OA       Prior Level of function: Prior to lumbar surgery, pt was using a can, and sometimes RW. Pt was Mod I for ADL. Pt uses electric scooter at the store. Since surgery, pt is using RW for ambulation, and Mod I for ADL.     ASSESSMENT   Patient demonstrates fair progress this session, goals  remain in progress.    Patient continues to function near baseline with bed mobility, transfers, and gait.  Contributing factors to remaining limitations include pain.  Next session anticipate patient to progress gait.  Physical Therapy will continue to follow patient for duration of hospitalization.    Patient continues to benefit from continued skilled PT services: at discharge to promote functional independence and safety with additional support and return home with home health PT.    PLAN  PT Treatment Plan: Bed mobility;Body mechanics;Endurance;Energy conservation;Patient education;Family education;Gait training;Strengthening;Stair training;Transfer training;Balance training  Rehab Potential : Good  Frequency (Obs): Daily    CURRENT GOALS     Goal #1 Patient is able to demonstrate supine - sit EOB @ level: modified independent      Goal #2 Patient is able to demonstrate transfers Sit to/from Stand at assistance level: modified independent      Goal #3 Patient is able to ambulate 150 feet with assist device: walker - rolling at assistance level: modified independent      Goal #4 Pt to ascend/descend 2 steps with supervision.    Goal #5     Goal #6     Goal Comments: Goals established on 2/10/2024      2/15/2024 all goals ongoing     SUBJECTIVE  \"Yeah this pain just comes in waves, and I am so nervous it will get worse when I move\"    OBJECTIVE  Precautions: Bed/chair alarm;Spine    WEIGHT  BEARING RESTRICTION                   PAIN ASSESSMENT   Rating: Unable to rate  Location: low back pain, and location of drain  Management Techniques: Activity promotion;Relaxation;Repositioning;Body mechanics    BALANCE                                                                                                                       Static Sitting: Good  Dynamic Sitting: Good           Static Standing: Poor +  Dynamic Standing: Poor +    ACTIVITY TOLERANCE                         O2 WALK  Oxygen Therapy  SPO2% on Room Air at Rest: 94      AM-PAC '6-Clicks' INPATIENT SHORT FORM - BASIC MOBILITY  How much difficulty does the patient currently have...  Patient Difficulty: Turning over in bed (including adjusting bedclothes, sheets and blankets)?: None   Patient Difficulty: Sitting down on and standing up from a chair with arms (e.g., wheelchair, bedside commode, etc.): A Little   Patient Difficulty: Moving from lying on back to sitting on the side of the bed?: A Little   How much help from another person does the patient currently need...   Help from Another: Moving to and from a bed to a chair (including a wheelchair)?: A Little   Help from Another: Need to walk in hospital room?: A Little   Help from Another: Climbing 3-5 steps with a railing?: A Little       AM-PAC Score:  Raw Score: 19   Approx Degree of Impairment: 41.77%   Standardized Score (AM-PAC Scale): 45.44   CMS Modifier (G-Code): CK    FUNCTIONAL ABILITY STATUS  Gait Assessment   Functional Mobility/Gait Assessment  Gait Assistance: Supervision  Distance (ft): 10 x 2, 60  Assistive Device: Rolling walker  Pattern: Shuffle (fear of increased pain)    Skilled Therapy Provided    Bed Mobility:  Rolling: SBA   Supine<>Sit: SBA (light tactile touch to encourage participation in transitional movement)   Sit<>Supine: NT     Transfer Mobility:  Sit<>Stand: SBA   Stand<>Sit: SBA   Gait: SBA with RW    Therapist's Comments: writer clarified Pt's concerns  regarding mobility are due to pain, not because of lack of strength.  Writer encouraged Pt to bring up concerns to medical team.     Pt did well with chair follow provided for confidence - Pt had the verbal control to communicate need for seated break.  Pt's brother witnessed session.       THERAPEUTIC EXERCISES  Lower Extremity Ankle pumps     Upper Extremity  - open/close     Position Sitting     Repetitions   10   Sets   1     Patient End of Session: Up in chair;Needs met;Call light within reach;RN aware of session/findings;All patient questions and concerns addressed;Alarm set;Family present    PT Session Time: 60 minutes  Gait Training: 15 minutes  Therapeutic Activity: 30 minutes  Therapeutic Exercise: 0 minutes   Neuromuscular Re-education: 8 minutes

## 2024-02-16 VITALS
TEMPERATURE: 99 F | DIASTOLIC BLOOD PRESSURE: 56 MMHG | HEIGHT: 63 IN | HEART RATE: 95 BPM | OXYGEN SATURATION: 92 % | SYSTOLIC BLOOD PRESSURE: 105 MMHG | RESPIRATION RATE: 16 BRPM | BODY MASS INDEX: 33.66 KG/M2 | WEIGHT: 190 LBS

## 2024-02-16 LAB
ALBUMIN SERPL-MCNC: 2.1 G/DL (ref 3.4–5)
ALBUMIN/GLOB SERPL: 0.5 {RATIO} (ref 1–2)
ALP LIVER SERPL-CCNC: 244 U/L
ALT SERPL-CCNC: 16 U/L
ANION GAP SERPL CALC-SCNC: 3 MMOL/L (ref 0–18)
AST SERPL-CCNC: 19 U/L (ref 15–37)
BILIRUB SERPL-MCNC: 0.5 MG/DL (ref 0.1–2)
BUN BLD-MCNC: 31 MG/DL (ref 9–23)
CALCIUM BLD-MCNC: 8.7 MG/DL (ref 8.5–10.1)
CHLORIDE SERPL-SCNC: 103 MMOL/L (ref 98–112)
CO2 SERPL-SCNC: 28 MMOL/L (ref 21–32)
CREAT BLD-MCNC: 0.77 MG/DL
EGFRCR SERPLBLD CKD-EPI 2021: 82 ML/MIN/1.73M2 (ref 60–?)
ERYTHROCYTE [DISTWIDTH] IN BLOOD BY AUTOMATED COUNT: 12.9 %
GLOBULIN PLAS-MCNC: 4.4 G/DL (ref 2.8–4.4)
GLUCOSE BLD-MCNC: 109 MG/DL (ref 70–99)
HCT VFR BLD AUTO: 33.6 %
HGB BLD-MCNC: 10.7 G/DL
MAGNESIUM SERPL-MCNC: 2.3 MG/DL (ref 1.6–2.6)
MCH RBC QN AUTO: 30.1 PG (ref 26–34)
MCHC RBC AUTO-ENTMCNC: 31.8 G/DL (ref 31–37)
MCV RBC AUTO: 94.4 FL
OSMOLALITY SERPL CALC.SUM OF ELEC: 285 MOSM/KG (ref 275–295)
PLATELET # BLD AUTO: 400 10(3)UL (ref 150–450)
POTASSIUM SERPL-SCNC: 3.8 MMOL/L (ref 3.5–5.1)
PROT SERPL-MCNC: 6.5 G/DL (ref 6.4–8.2)
RBC # BLD AUTO: 3.56 X10(6)UL
SODIUM SERPL-SCNC: 134 MMOL/L (ref 136–145)
WBC # BLD AUTO: 10.2 X10(3) UL (ref 4–11)

## 2024-02-16 PROCEDURE — 80053 COMPREHEN METABOLIC PANEL: CPT | Performed by: HOSPITALIST

## 2024-02-16 PROCEDURE — 85027 COMPLETE CBC AUTOMATED: CPT | Performed by: HOSPITALIST

## 2024-02-16 PROCEDURE — 83735 ASSAY OF MAGNESIUM: CPT | Performed by: HOSPITALIST

## 2024-02-16 RX ORDER — POTASSIUM CHLORIDE 20 MEQ/1
40 TABLET, EXTENDED RELEASE ORAL ONCE
Status: DISCONTINUED | OUTPATIENT
Start: 2024-02-16 | End: 2024-02-16

## 2024-02-16 RX ORDER — POTASSIUM CHLORIDE 20 MEQ/1
40 TABLET, EXTENDED RELEASE ORAL ONCE
Status: COMPLETED | OUTPATIENT
Start: 2024-02-16 | End: 2024-02-16

## 2024-02-16 RX ORDER — SENNA AND DOCUSATE SODIUM 50; 8.6 MG/1; MG/1
1 TABLET, FILM COATED ORAL DAILY
Qty: 30 TABLET | Refills: 0 | Status: SHIPPED | OUTPATIENT
Start: 2024-02-16

## 2024-02-16 NOTE — PHYSICAL THERAPY NOTE
Pt remains up at SBA level with use of RW - Pt reported high levels of pain with any and all movement.  Pt has been cleared for dc from mobility standpoint since 2/15.  Discussed with ERMA Bush

## 2024-02-16 NOTE — PLAN OF CARE
A&O x4. VSS on RA. . Pain moderately controlled with PO pain medication and flexeril. Up with assist x1 with GB and walker. Voids freely, purewick in place. Bilateral SCDs. Coverlet dressing x2 C/D/I. Steri strips to L flank C/D/I. IV merrem q8. PICC line to RUE. MANOJ drain irrigate and aspirate q8 per orders. CT abdomen with contrast completed tonight. Reviewed POC, pain management, IS use, and fall precautions with pt. Bed alarm on w/bed in lowest position. Pt reminded to use call light. Verbalized understanding.

## 2024-02-16 NOTE — PROGRESS NOTES
[facility] Infectious Disease Progress Note    Gloria Mccrary Patient Status:  Inpatient    1950 MRN [id number]   Location [facility] 3SW-A Attending Edi Summers MD   Hosp Day # 9 PCP Gordo Antonio MD     Subjective:  Pt complains of pain with movement. Pt states pain is now radiating more midline.    Objective:    Allergies:  Allergies   Allergen Reactions    Meloxicam CONFUSION       Medications:    Current Facility-Administered Medications:     potassium chloride (K-Dur) tab 40 mEq, 40 mEq, Oral, Once    meropenem (Merrem) 500 mg in sodium chloride 0.9% 100 mL IVPB-MBP, 500 mg, Intravenous, Q8H    HYDROcodone-acetaminophen (Norco)  MG per tab 1 tablet, 1 tablet, Oral, Q6H PRN **OR** HYDROcodone-acetaminophen (Norco)  MG per tab 2 tablet, 2 tablet, Oral, Q6H PRN    multivitamin (Centrum) chewable tab (Adult) 1 tablet, 1 tablet, Oral, Daily    diazePAM (Valium) tab 5 mg, 5 mg, Oral, Q8H PRN    bisacodyl (Dulcolax) 10 MG rectal suppository 10 mg, 10 mg, Rectal, Daily PRN    losartan (Cozaar) tab 100 mg, 100 mg, Oral, Daily    amLODIPine (Norvasc) tab 10 mg, 10 mg, Oral, Daily    atorvastatin (Lipitor) tab 40 mg, 40 mg, Oral, QAM    docusate sodium (Colace) cap 100 mg, 100 mg, Oral, BID    DULoxetine (Cymbalta) DR cap 20 mg, 20 mg, Oral, Daily    levothyroxine (Synthroid) tab 75 mcg, 75 mcg, Oral, Daily @ 0700    metoprolol succinate ER (Toprol XL) 24 hr tab 25 mg, 25 mg, Oral, Daily    acetaminophen (Tylenol Extra Strength) tab 500 mg, 500 mg, Oral, Q4H PRN    polyethylene glycol (PEG 3350) (Miralax) 17 g oral packet 17 g, 17 g, Oral, Daily PRN    sennosides (Senokot) tab 17.2 mg, 17.2 mg, Oral, Nightly PRN    ondansetron (Zofran) 4 MG/2ML injection 4 mg, 4 mg, Intravenous, Q6H PRN    metoclopramide (Reglan) 5 mg/mL injection 5 mg, 5 mg, Intravenous, Q8H PRN    cyclobenzaprine (Flexeril) tab 10 mg, 10 mg, Oral, TID PRN    Physical Exam:  General: Alert, orientated  x3.  Cooperative.  No apparent distress.  Vital Signs:  Blood pressure 128/57, pulse 87, temperature 97.3 °F (36.3 °C), temperature source Oral, resp. rate 18, height 5' 3\" (1.6 m), weight 190 lb (86.2 kg), SpO2 98%, not currently breastfeeding.   Temp (24hrs), Av.8 °F (36.6 °C), Min:97.3 °F (36.3 °C), Max:98.6 °F (37 °C)      HEENT: Exam is unremarkable.  Without scleral icterus.  Mucous membranes are moist. PERRLA.  Oropharynx is clear.  Neck: No tenderness to palpitation.  Full range of motion to flexion and extension, lateral rotation and lateral flexion of cervical spine.  No JVD. Supple.   Lungs: Clear to auscultation bilaterally.  Cardiac: Regular rate and rhythm. No murmur.  Abdomen:  Soft, non-distended, non-tender, with no rebound or guarding.   Extremities:  No lower extremity edema noted.  Without clubbing or cyanosis.    Skin: Normal texture and turgor.  Neurologic: Cranial nerves are grossly intact.  Motor strength and sensory examination is grossly normal.  No focal neurologic deficit.    Assessment/Plan:    1.  Psoas abscess  -s/p spine surgery on 24  -admitted for worsening pain  -wound cultures with ESBL klebsiella  -MRI with L psoas fluid collection  -s/p IR drainage with drain placement, purulent drainage noted, cultures NG  -plans for abscessogram as outpatient  -on IV meropenem  2.  Hx of seizure  -no recent episode  3.  Dispo  -PICC in place, IV ertapenem through 3/8  -follow up in 1-2 weeks     If you have any questions or concerns please call [facility] Infectious Disease at [phone].     EFRAÍN Membreno

## 2024-02-16 NOTE — DISCHARGE SUMMARY
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  For all other patients, please follow usual protocol for discharge care transition.    Secondary Diagnoses: None    Risk of readmission: Gloria Mccrary has Moderate Risk of readmission after discharge from the hospital.    Discharge Condition: stable    Disposition: home    Important Follow up:  - PCP - appt scheduled for 02/19  - Consults:     Gordo Antonio MD  [address]  [address]  [place] [address]  [phone]    Follow up on 2/19/2024  at 1130    Ayanna Valdez MD  [address]  [place] [address]  [phone]    Schedule an appointment as soon as possible for a visit in 2 week(s)      Sharon Gomez MD  [address]  [address]  [place] [address]  [phone]    Follow up in 1 week(s)  As needed    Gordo Martinez MD  [address]  [place] [address]  [phone]    Follow up  As needed    - Labs: none  - Radiology: none    Hospital Course:          73 year old female with mmp including but not limited to HTN/HL, asthma, seizure disorder, OA, MDD in partial remission is admitted for L sided abdominal pain.      **drowsiness, AMS- suspect secondary to IV dilaudid- RESOLVED  -thought to be from opioids      **L sided abdominal pain secondary to  **loculated  fluid collections around psoas muscle, abscess sp IR drainage of 53 ml of pus on 02/09  -ID, spine on, appreciate. Seen by gen surg, maya  -BC, incision drainage cx with ESBL. Pct minimally elevated.     -ID consult, appreciate-on meropenem in hospital                 - dc on IV abx via PICC  -02/15 complained of worsening pain but had not used norco as much as she could. Repeat imaging was done - psoas fluid collection is smaller but has a new small fluid collection in the L paraspinal area. Reynaldo ADORNO with spine, who discussed with Dr Gomez, no further imaging or procedures recommended. Rec to cont abx.   -on 02/16 her pain is improved with more regular pain regimen. She also had good bm per my dw nurses, so could have contributed. She was seen by PT . Reynaldo BENJAMIN, pt does not qualify for rehab.   Dw sister over phone. Per my dw sister pt has had this pain for a year, same location / area. It was thought to be the back thus she had the surgery.  She has also seen pain specialist as outpt. Pt confirmed that this pain has been present for a year, comes and goes. Was relived with diclofenac but her spine surgery had rec against this medication. Her pain is worse with movement, at rest is 1/10. Suspect abscess now is also contributing to this symptoms. No n/v. She is tolerating diet. Dw pt to return if having neurogogcal chagnes, worsening pain.    **constipation as noted on CT a/p. Bowel regimen     **hypoxia- suspect splinting from pain and narcotic effect. Asymptomatic. IS as able. Monitor- RESOLVED     **leukocytosis-suspect reactive. Resolved     Stable chronic illnesses:  HTN/HL-home meds. Arb resumed. Bp controlled  Seizure disorder-not on meds  MDD in partial remission-home med      Consults: IP CONSULT TO ORTHOPEDIC SURGERY  IP CONSULT TO GENERAL SURGERY  NURSING CONSULT TO DIETITIAN  IP CONSULT TO SOCIAL WORK  IP CONSULT TO INFECTIOUS DISEASE  IP CONSULT TO VASCULAR ACCESS TEAM  IP CONSULT TO  VASCULAR ACCESS TEAM  IP CONSULT TO SOCIAL WORK  IP CONSULT TO SOCIAL WORK    Operative Procedures:        Patient instructions:      Current Discharge Medication List        START taking these medications    Details   cyclobenzaprine 10 MG Oral Tab Take 1 tablet (10 mg total) by mouth 3 (three) times daily as needed for Muscle spasms.      HYDROcodone-acetaminophen  MG Oral Tab Take 1 tablet by mouth every 6 (six) hours as needed.      ertapenem 1 g Injection Recon Soln Inject 1 g into the vein daily for 24 days. Invanz 1 gm IVPB daily for four weeks, CBC CMP CRP weekly while on IV abx           CONTINUE these medications which have NOT CHANGED    Details   metoprolol succinate ER 25 MG Oral Tablet 24 Hr Take 1 tablet (25 mg total) by mouth daily.      amLODIPine 10 MG Oral Tab Take 1 tablet (10 mg total) by mouth daily.      atorvastatin 40 MG Oral Tab Take 1 tablet (40 mg total) by mouth every morning.      LOSARTAN 100 MG Oral Tab TAKE 1 TABLET BY MOUTH  DAILY      DULoxetine HCl 20 MG Oral Cap DR Particles Take 1 capsule (20 mg total) by mouth daily.      Levothyroxine Sodium 75 MCG Oral Tab Take 1 tablet (75 mcg total) by mouth daily.      acetaminophen 325 MG Oral Tab Take 2 tablets (650 mg total) by mouth every 6 (six) hours as needed for Pain.      docusate sodium 100 MG Oral Cap Take 100 mg by mouth 2 (two) times daily.      polyethylene glycol, PEG 3350, 17 g Oral Powd Pack Take 17 g by mouth daily as needed.      fluticasone-salmeterol 250-50 MCG/DOSE Inhalation Aerosol Powder, Breath Activated Inhale 1 puff into the lungs 2 (two) times daily.      Multiple Vitamins-Minerals (OCUTABS-LUTEIN) Oral Tab Take 1 tablet by mouth daily.           STOP taking these medications       diphenhydrAMINE (BENADRYL ALLERGY) 25 MG Oral Cap        tiZANidine 2 MG Oral Tab        acetaminophen-codeine 300-30 MG Oral Tab                 Activity: activity as tolerated  Diet: cardiac diet  Wound Care: as  directed  Code Status: No Order      Exam on day of discharge:     Vitals:    02/16/24 1617   BP: 105/56   Pulse: 95   Resp: 16   Temp: 98.8 °F (37.1 °C)       General: no acute distress, alert and oriented x 3  Heart: RRR  Lungs: clear bilaterally, no active wheezing  Abdomen: mild ttp in left lower abd- improved from before   Extremities: no pedal edema   Neuro: CN inact, no focal deficits. Strength symmetrical in LEs      Total time coordinating care for discharge: Greater than 32 min      NHI Mathews DO

## 2024-02-16 NOTE — CM/SW NOTE
VM received from pt's sister Emiliana. Attempted to call back and return VM left #[phone]. Discussed case with hospitalist and RN. Plans for discharge home today after today's dose of IV Invanz administered.     Kendra Zhu, DAYLINN, RN-BC    [id number]

## 2024-02-17 NOTE — PLAN OF CARE
NURSING DISCHARGE NOTE    Discharged Home with home health.  via Wheelchair.  Accompanied by Support staff  Belongings Taken by patient/family.

## 2024-02-17 NOTE — PLAN OF CARE
Drain management reviewed and demonstrated again, watched video regarding home drain management.  Verbalized understanding.   at bedside, verbal and written discharge instructions discussed, all questions answered.  Verbalized understanding.  Prescriptions enclosed in discharge paperwork.

## 2024-02-17 NOTE — PLAN OF CARE
Had bowel movement today.  Sitting up in chair, has been up to bathroom and ambulated in hallway w/walker and s/b assist.   Irrigated and aspirated drain as per order, small amount of serosanguinous fluid in drain bulb.  Drain maintained to bulb suction.   Drain site clean, dry, intact.  Back incisions with steri strips, clean, dry, intact.  Dressings changed, sterile coverlets placed.  States pain very minimal when sitting in chair and in bed.  Discharge plan to home with home health.   Instructed on plan of care, call don't fall protocol, encouraged activity, drain management, verbalized understanding, and in agreement with discharge plan.  States her  notified of discharge plans.

## 2024-02-20 NOTE — PAYOR COMM NOTE
Awaiting reconsideration  DISCHARGE REVIEW    Payor: [employer]  Subscriber #:  [id number]  Authorization Number: [id number]    Admit date: 2/7/24  Admit time:   5:22 PM  Discharge Date: 2/16/2024  7:35 PM     Admitting Physician: Edi Summers MD  Attending Physician:  No att. providers found  Primary Care Physician: Gordo Antonio MD          Discharge Summary Notes        Discharge Summary signed by Zander Mathews DO at 2/16/2024  5:39 PM       Author: Zander Mathews DO Specialty: Internal Medicine Author Type: Physician    Filed: 2/16/2024  5:39 PM Date of Service: 2/16/2024  4:46 PM Status: Signed    : Zander Mathews DO (Physician)                                                              General Medicine Discharge Summary     Patient ID:  Gloria Mccrary  73 year old  6/28/1950    Admit date: 2/7/2024    Discharge date and time: 2/16/2023    Attending Physician: Edi Summers MD / Reid    Primary Care Physician: Gordo Antonio MD     Discharge Diagnoses:       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  For all other patients, please follow usual protocol for discharge care transition.    Secondary Diagnoses: None    Risk of readmission: Gloria Mccrary has Moderate Risk of readmission after discharge from the hospital.    Discharge Condition: stable    Disposition: home    Important Follow up:  - PCP - appt scheduled for 02/19  - Consults:     Gordo Antonio MD  [address] [address]  [phone]    Follow up on 2/19/2024  at 1130    Ayanna Valdez MD  [address] [facility] [address]  [place]  [id number] [phone]    Schedule an appointment as soon as possible for a visit in 2 week(s)      Sharon Gomez MD  [address]  [address]  [facility] [address]  [phone]    Follow up in 1 week(s)  As needed    Gordo Martinez MD  [address]  [address]  [facility] [id number] [phone]    Follow up  As needed    - Labs: none  - Radiology: none    Hospital Course:          73 year old female with mmp including but not limited to HTN/HL, asthma, seizure disorder, OA, MDD in partial remission is admitted for L sided abdominal pain.      **drowsiness, AMS- suspect secondary to IV dilaudid- RESOLVED  -thought to be from opioids      **L sided abdominal pain secondary to  **loculated fluid collections around psoas muscle, abscess sp IR drainage of 53 ml of pus on 02/09  -ID, spine on, appreciate. Seen by gen surg, appreciate  -BC, incision drainage cx with ESBL. Pct minimally elevated.     -ID consult, appreciate-on meropenem in hospital                 - dc on IV abx via PICC  -02/15 complained of worsening pain but had not used norco as much as she could. Repeat imaging was done - psoas fluid collection is smaller but has a new small fluid collection in the L paraspinal area. Reynaldo ADORNO with spine, who discussed with Dr Gomez, no further imaging or procedures recommended. Rec to cont abx.   -on 02/16 her pain is improved with more regular pain regimen. She also had good bm per my dw nurses, so could have contributed. She was seen by PT . Reynaldo SW, pt does not qualify for rehab.   Dw sister over phone. Per my dw sister pt has had this pain for a year, same location / area. It was thought to be the back thus she had the surgery.  She has also seen pain specialist as outpt. Pt confirmed that this pain has been present for a year, comes and goes. Was relived with diclofenac but her spine surgery had rec against this medication. Her pain is worse with movement, at rest is 1/10. Suspect abscess  now is also contributing to this symptoms. No n/v. She is tolerating diet. Dw pt to return if having neurogogcal chagnes, worsening pain.    **constipation as noted on CT a/p. Bowel regimen     **hypoxia- suspect splinting from pain and narcotic effect. Asymptomatic. IS as able. Monitor- RESOLVED     **leukocytosis-suspect reactive. Resolved     Stable chronic illnesses:  HTN/HL-home meds. Arb resumed. Bp controlled  Seizure disorder-not on meds  MDD in partial remission-home med      Consults: IP CONSULT TO ORTHOPEDIC SURGERY  IP CONSULT TO GENERAL SURGERY  NURSING CONSULT TO DIETITIAN  IP CONSULT TO SOCIAL WORK  IP CONSULT TO INFECTIOUS DISEASE  IP CONSULT TO VASCULAR ACCESS TEAM  IP CONSULT TO VASCULAR ACCESS TEAM  IP CONSULT TO SOCIAL WORK  IP CONSULT TO SOCIAL WORK    Operative Procedures:        Patient instructions:      Current Discharge Medication List        START taking these medications    Details   cyclobenzaprine 10 MG Oral Tab Take 1 tablet (10 mg total) by mouth 3 (three) times daily as needed for Muscle spasms.      HYDROcodone-acetaminophen  MG Oral Tab Take 1 tablet by mouth every 6 (six) hours as needed.      ertapenem 1 g Injection Recon Soln Inject 1 g into the vein daily for 24 days. Invanz 1 gm IVPB daily for four weeks, CBC CMP CRP weekly while on IV abx           CONTINUE these medications which have NOT CHANGED    Details   metoprolol succinate ER 25 MG Oral Tablet 24 Hr Take 1 tablet (25 mg total) by mouth daily.      amLODIPine 10 MG Oral Tab Take 1 tablet (10 mg total) by mouth daily.      atorvastatin 40 MG Oral Tab Take 1 tablet (40 mg total) by mouth every morning.      LOSARTAN 100 MG Oral Tab TAKE 1 TABLET BY MOUTH  DAILY      DULoxetine HCl 20 MG Oral Cap DR Particles Take 1 capsule (20 mg total) by mouth daily.      Levothyroxine Sodium 75 MCG Oral Tab Take 1 tablet (75 mcg total) by mouth daily.      acetaminophen 325 MG Oral Tab Take 2 tablets (650 mg total) by mouth  every 6 (six) hours as needed for Pain.      docusate sodium 100 MG Oral Cap Take 100 mg by mouth 2 (two) times daily.      polyethylene glycol, PEG 3350, 17 g Oral Powd Pack Take 17 g by mouth daily as needed.      fluticasone-salmeterol 250-50 MCG/DOSE Inhalation Aerosol Powder, Breath Activated Inhale 1 puff into the lungs 2 (two) times daily.      Multiple Vitamins-Minerals (OCUTABS-LUTEIN) Oral Tab Take 1 tablet by mouth daily.           STOP taking these medications       diphenhydrAMINE (BENADRYL ALLERGY) 25 MG Oral Cap        tiZANidine 2 MG Oral Tab        acetaminophen-codeine 300-30 MG Oral Tab                 Activity: activity as tolerated  Diet: cardiac diet  Wound Care: as directed  Code Status: No Order      Exam on day of discharge:     Vitals:    02/16/24 1617   BP: 105/56   Pulse: 95   Resp: 16   Temp: 98.8 °F (37.1 °C)       General: no acute distress, alert and oriented x 3  Heart: RRR  Lungs: clear bilaterally, no active wheezing  Abdomen: mild ttp in left lower abd- improved from before   Extremities: no pedal edema   Neuro: CN inact, no focal deficits. Strength symmetrical in LEs      Total time coordinating care for discharge: Greater than 32 min      NHI Mathews DO        Electronically signed by Zander Mathews DO on 2/16/2024  5:39 PM

## 2024-03-11 ENCOUNTER — HOSPITAL ENCOUNTER (OUTPATIENT)
Dept: CT IMAGING | Facility: HOSPITAL | Age: 74
Discharge: HOME | End: 2024-03-11
Attending: RADIOLOGY
Payer: MEDICARE

## 2024-03-11 DIAGNOSIS — K68.12 PSOAS ABSCESS (HCC): ICD-10-CM

## 2024-03-11 PROCEDURE — 76080 X-RAY EXAM OF FISTULA: CPT | Performed by: RADIOLOGY

## 2024-03-11 PROCEDURE — 49424 ASSESS CYST CONTRAST INJECT: CPT | Performed by: RADIOLOGY

## 2024-03-11 RX ORDER — IOHEXOL 350 MG/ML
1 INJECTION, SOLUTION INTRAVENOUS
Status: COMPLETED | OUTPATIENT
Start: 2024-03-11 | End: 2024-03-11

## 2024-03-11 RX ADMIN — IOHEXOL 1 ML: 350 INJECTION, SOLUTION INTRAVENOUS at 13:53:00

## 2024-03-11 NOTE — PROCEDURES
[facility]   part of [facility]  Procedure Note    Gloria Mccrary Patient Status:  Outpatient    1950 MRN [id number]   Location [facility] CT Attending Rubén Lester MD   Hosp Day # 0 PCP Gordo Antonio MD     Procedure: CT tube check    Pre-Procedure Diagnosis:  Psoas abscess    Post-Procedure Diagnosis: Same    Anesthesia:   None    Findings:  Pt clinically improved.  No output from the tube for several days per pt.  No residual abscess seen with contrast.  Tube removed intact    Specimens: None    Blood Loss:  Minimal    Tourniquet Time: None  Complications:  None  Drains:  None    Secondary Diagnosis:  None    Nikita Linton MD  3/11/2024

## 2024-12-16 NOTE — IP AVS SNAPSHOT
Patient Demographics     Address  [address] [facility] [address] Phone  [phone] (Home)  [phone] (Mobile) *Preferred* E-mail Address  [email or web address]      Patient Contacts     Name Relation Home Work Mobile    Jeremy Mccrary Spouse   [phone]      Allergies as of 2/16/2024  Review status set to In Progress on 2/8/2024       Noted Reaction Type Reactions    Meloxicam 02/07/2024    CONFUSION    DELETED: No Known Allergies 10/14/2009          Code Status Information     Code Status    Not on file        Patient Instructions       Sometimes managing your health at home requires assistance.  The [facility] team has recognized your preference to use [facility].  They can be reached by phone at [phone].  The fax number for your reference is [phone].  A representative from the home health agency will contact you or your family to schedule your first visit.      It was recommended that you have IV antibiotics at home to facilitate your recovery and compliment your treatment.  The [facility] team has set up delivery with [facility].  Contact information:  [address], [place] [address].  Phone: [phone].  The estimated delivery is TBD.    If you experienced any difficulties with the delivery, please contact the [facility] department at [phone].    Per Dr Pratt regarding MANOJ drain:         Bulb (i.e.: Justin Turner/Saul/IR Bulb)     Drain/Tube function: Irrigate    Irrigation frequency: Q 8 hours and then manually aspirate   Amount/Solution: 10 cc Normal Saline    Monitor/Record Output: Q 8 hours      When drainage is <10cc/day x 2 days then abscessogram.       PLEASE CHANGE PICC LINE DRESSING IN AM 2/15/2024    PICC orders:  0.9% NS flush 10 ml as needed every 7 days when not in use.  0.9% NS flush 20 ml after blood draws.  0.9% NS flush 10 ml after each use.  Dressing changes: Transparent dressing without gauze  every 7 days or as needed, if soiled, wet, or non-occlusive.   Apply heat to affective area as needed for discomfort.        Medicare Referrals for Psychiatry and Counseling   [facility]  [address]  [place] [address]  [phone]    [facility]  Multiple: [address], [address], [place], [place] -or- [address], [address], [place], [place] -or - [address], [address], [place], [place] -or- [address], [place], [place]  [phone]    [facility]  [address], [address], [place], [place]  [phone]    [facility]  [address] [address], [place]  [phone]    [facility]  [address], [address], [place], [place]  [phone]    [facility] Locations: [facility], and [facility] Locations: [place], [place], [place] and [place]   [phone]    [facility]   [address], [address], [place], [place]  [phone]    Please draw weekly CBC with diff, CMP and CRP for duration of abx therapy and fax results to [facility] at [phone].  - F/u with ID 2 weeks after discharge or sooner if necessary.     If you notice worsening pain, fevers / chills, neurological changes / weakness in legs, let PCP know or go to urgent care/ER.   Your MRI of abdomen incidentally showed small pancreatic cysts, please follow up with PCP as you may need repeat imaging to monitor for interval changes    Drain management as indicated above.      Walk several times a day, be up in chair for meals.  Use assistive devices as needed (walker).    Keep back incisions clean, and dry at all times.  Change back and flank dressings daily and as needed, using sterile gauze and paper tape or coverlets.           Follow-up Information     Gordo Antonio MD Follow up on 2/19/2024.    Specialty: Internal Medicine  Why: at  [phone]  Contact information:  [address]  [address]  [facility] [address]  [phone]             Ayanna Valdez MD. Schedule an appointment as soon as possible for a visit in 2 week(s).    Specialty: INFECTIOUS DISEASES  Contact information:  [address]  [address]  [place] [address]  [phone]             Sharon Gomez MD Follow up in 1 week(s).    Specialties: Surgery, Orthopaedic, SURGERY, ORTHOPEDIC  Why: As needed  Contact information:  [address]  [address]  [facility] [address]  [phone]             Gordo Martinez MD Follow up.    Specialty: SURGERY, GENERAL  Why: As needed  Contact information:  [address]  [facility] [address]  [phone]                        Your Home Meds List      TAKE these medications       Instructions Authorizing Provider Morning Afternoon Evening As Needed   acetaminophen 325 MG Tabs  Commonly known as: Tylenol  Next dose due: Do not take with hydrocodone - acetaminophen      Take 2 tablets (650 mg total) by mouth every 6 (six) hours as needed for Pain.   Silvia Scott         amLODIPine 10 MG Tabs  Commonly known as: Norvasc  Next dose due: Tomorrow am      Take 1 tablet (10 mg total) by mouth daily.          atorvastatin 40 MG Tabs  Commonly known as: Lipitor  Next dose due: Tomorrow am      Take 1 tablet (40 mg total) by mouth every morning.          cyclobenzaprine 10 MG Tabs  Commonly known as: Flexeril      Take 1 tablet (10 mg total) by mouth 3 (three) times daily as needed for Muscle spasms.   Edi Summers         DULoxetine 20 MG Cpep  Commonly known as: Cymbalta  Next dose due: Tomorrow am       Take 1 capsule (20 mg total) by mouth daily.   Gordo Antonio         ertapenem 1 g Solr  Commonly known as: Invanz  Next dose due: Tomorrow at 2 pm       Inject 1 g into the vein daily for 24 days. Invanz 1 gm IVPB daily for four weeks, CBC CMP CRP weekly while on IV abx  Stop taking on: March 8, 2024   John  Buck         fluticasone-salmeterol 250-50 MCG/ACT Aepb  Commonly known as: Advair Diskus  Next dose due: Tonight at 9 pm      Inhale 1 puff into the lungs 2 (two) times daily.   Gordo Antonio         HYDROcodone-acetaminophen  MG Tabs  Commonly known as: Norco      Take 1 tablet by mouth every 6 (six) hours as needed.   Edi Summers         levothyroxine 75 MCG Tabs  Commonly known as: Synthroid  Next dose due: Tomorrow am      Take 1 tablet (75 mcg total) by mouth daily.   Gordo MORA Antonio         losartan 100 MG Tabs  Commonly known as: Cozaar  Next dose due: Tomorrow am      TAKE 1 TABLET BY MOUTH  DAILY   Gordo MORA Antonio         metoprolol succinate ER 25 MG Tb24  Commonly known as: Toprol XL  Next dose due: Tomorrow am      Take 1 tablet (25 mg total) by mouth daily.          Polyethylene Glycol 3350 17 g Pack  Commonly known as: MIRALAX      Take 17 g by mouth daily as needed.   Silvia Scott         senna-docusate 8.6-50 MG Tabs  Commonly known as: Senokot-S  Next dose due: Tomorrow morning      Take 1 tablet by mouth daily.   Zander Mathews               Where to Get Your Medications      These medications were sent to [id number] [address] SEC OF [facility] [address], [phone], [phone]  [address] , [facility] [address]    Phone: [phone]   ertapenem 1 g Solr     Please  your prescriptions at the location directed by your doctor or nurse    Bring a paper prescription for each of these medications  cyclobenzaprine 10 MG Tabs  HYDROcodone-acetaminophen  MG Tabs  senna-docusate 8.6-50 MG Tabs           [id number] - MAR ACTION REPORT  (last 48 hrs)    ** SITE UNKNOWN **     Order ID Medication Name Action Time Action Reason Comments    [id number] DULoxetine (Cymbalta)  cap 20 mg 02/15/24 0903 Given      [id number] DULoxetine (Cymbalta) DR cap 20 mg 02/16/24 1054 Given      [id number]  HYDROcodone-acetaminophen (Norco)  MG per tab 1 tablet (Or Linked Group #1) 02/15/24 0010 Given      [id number] HYDROcodone-acetaminophen (Norco)  MG per tab 1 tablet 02/15/24 0911 Given      [id number] HYDROcodone-acetaminophen (Norco)  MG per tab 1 tablet 02/15/24 1638 Given      [id number] HYDROcodone-acetaminophen (Norco)  MG per tab 1 tablet 02/15/24 2243 Given      [id number] HYDROcodone-acetaminophen (Norco)  MG per tab 1 tablet 02/16/24 0535 Given      [id number] HYDROcodone-acetaminophen (Norco)  MG per tab 1 tablet 02/16/24 1054 Given      [id number] HYDROcodone-acetaminophen (Norco)  MG per tab 1 tablet 02/16/24 1759 Given      [id number] amLODIPine (Norvasc) tab 10 mg 02/15/24 0903 Given      [id number] amLODIPine (Norvasc) tab 10 mg 02/16/24 1054 Given      [id number] atorvastatin (Lipitor) tab 40 mg 02/15/24 0903 Given      [id number] atorvastatin (Lipitor) tab 40 mg 02/16/24 1054 Given      [id number] bisacodyl (Dulcolax) 10 MG rectal suppository 10 mg 02/16/24 1148 Given      [id number] cyclobenzaprine (Flexeril) tab 10 mg 02/14/24 2212 Given      [id number] cyclobenzaprine (Flexeril) tab 10 mg 02/15/24 1029 Given      [id number] cyclobenzaprine (Flexeril) tab 10 mg 02/15/24 1638 Given      [id number] cyclobenzaprine (Flexeril) tab 10 mg 02/15/24 2243 Given      [id number] cyclobenzaprine (Flexeril) tab 10 mg 02/16/24 0536 Given      [id number] cyclobenzaprine (Flexeril) tab 10 mg 02/16/24 1335 Given      [id number] docusate sodium (Colace) cap 100 mg 02/15/24 0903 Given      [id number] docusate sodium (Colace) cap 100 mg 02/15/24 2243 Given      [id number] docusate sodium (Colace) cap 100 mg 02/16/24 1054 Given      [id number] ertapenem (Invanz) 1 g in sodium chloride 0.9% 100 mL IVPB-MBP 02/16/24 1600 New Bag      [id number] iopamidol 76% (ISOVUE-370) injection for power injector 02/15/24 2131 Given      [id number] levothyroxine (Synthroid) tab 75 mcg 02/15/24 0604 Given       [id number] levothyroxine (Synthroid) tab 75 mcg 02/16/24 0536 Given      [id number] losartan (Cozaar) tab 100 mg 02/15/24 0903 Given      [id number] losartan (Cozaar) tab 100 mg 02/16/24 1054 Given      [id number] metoprolol succinate ER (Toprol XL) 24 hr tab 25 mg 02/15/24 0903 Given      [id number] metoprolol succinate ER (Toprol XL) 24 hr tab 25 mg 02/16/24 1054 Given      [id number] multivitamin (Centrum) chewable tab (Adult) 1 tablet 02/15/24 0903 Given      [id number] multivitamin (Centrum) chewable tab (Adult) 1 tablet 02/16/24 1053 Given      [id number] potassium chloride (K-Dur) tab 40 mEq 02/16/24 1148 Given      [id number] sennosides (Senokot) tab 17.2 mg 02/15/24 2243 Given              Recent Vital Signs    Flowsheet Row Most Recent Value   /56 Filed at 02/16/2024 1617   Pulse 95 Filed at 02/16/2024 1617   Resp 16 Filed at 02/16/2024 1617   Temp 98.8 °F (37.1 °C) Filed at 02/16/2024 1617   SpO2 92 % Filed at 02/16/2024 1617      Patient's Most Recent Weight    Flowsheet Row Most Recent Value   Patient Weight 86.2 kg (190 lb)         Lab Results Last 24 Hours      Magnesium [[id number]] (Normal)  Resulted: 02/16/24 0554, Result status: Final result   Ordering provider: Zander Mathews DO  02/15/24 2300 Resulting lab: [facility] ([facility])    Specimen Information    Type Source Collected On   Blood — 02/16/24 0515          Components    Component Value Reference Range Flag Lab   Magnesium 2.3 1.6 - 2.6 mg/dL — [facility] ([facility])            Comp Metabolic Panel (14) [[id number]] (Abnormal)  Resulted: 02/16/24 0554, Result status: Final result   Ordering provider: Zander Mathews DO  02/15/24 2300 Resulting lab: [facility] ([facility])    Specimen Information    Type Source Collected On   Blood — 02/16/24 0515          Components    Component Value Reference Range Flag Lab   Glucose 109 70 - 99 mg/dL [facility] ([facility])   Sodium 134 136 - 145 mmol/L L [facility]  ([facility])   Potassium 3.8 3.5 - 5.1 mmol/L — [facility] ([facility])   Chloride 103 98 - 112 mmol/L — [facility] ([facility])   CO2 28.0 21.0 - 32.0 mmol/L — [facility] ([facility])   Anion Gap 3 0 - 18 mmol/L — [facility] ([facility])   BUN 31 9 - 23 mg/dL [facility] ([facility])   Creatinine 0.77 0.55 - 1.02 mg/dL — [facility] ([facility])   Calcium, Total 8.7 8.5 - 10.1 mg/dL — [facility] ([facility])   Calculated Osmolality 285 275 - 295 mOsm/kg — [facility] ([facility])   eGFR-Cr 82 >=60 mL/min/1.73m2 — [facility])   AST 19 15 - 37 U/L — [facility] ([facility])   ALT 16 13 - 56 U/L — [facility] ([facility])   Alkaline Phosphatase 244 55 - 142 U/L [facility] ([facility])   Bilirubin, Total 0.5 0.1 - 2.0 mg/dL — [facility] ([facility])   Total Protein 6.5 6.4 - 8.2 g/dL — [facility])   Albumin 2.1 3.4 - 5.0 g/dL [facility] ([facility])   Globulin  4.4 2.8 - 4.4 g/dL — [facility] ([facility])   A/G Ratio 0.5 1.0 - 2.0 L [facility] ([facility])            CBC, Platelet; No Differential [[id number]] (Abnormal)  Resulted: 02/16/24 0532, Result status: Final result   Ordering provider: Zander Mathews DO  02/15/24 2300 Resulting lab: [facility] ([facility])    Specimen Information    Type Source Collected On   Blood — 02/16/24 0515          Components    Component Value Reference Range Flag Lab   WBC 10.2 4.0 - 11.0 x10(3) uL — [facility] ([facility])   RBC 3.56 3.80 - 5.30 x10(6)uL L [facility] ([facility])   HGB 10.7 12.0 - 16.0 g/dL [facility] ([facility])   HCT 33.6 35.0 - 48.0 % L [facility] ([facility])   .0 150.0 - 450.0 10(3)uL — [facility] ([facility])   MCV 94.4 80.0 - 100.0 fL — [facility] ([facility])   MCH 30.1 26.0 - 34.0 pg — [facility] ([facility])   MCHC 31.8 31.0 - 37.0 g/dL — [facility] ([facility])   RDW 12.9 % — [facility] ([facility])            Testing Performed By     Lab - Abbreviation Name Director Address Valid Date Range    139 - [facility] ([facility]) [facility] ([facility]) Goldberg, Cathryn A. MD [address] [place] [id number] 03/19/20 1441 - Present            Microbiology Results  (All)     Procedure Component Value Units Date/Time    Blood Culture [[id number]] Collected: 02/07/24 1047    Order Status: Completed Lab Status: Final result Updated: 02/12/24 1501    Specimen: Blood,peripheral      Blood Culture Result No Growth 5 Days    Narrative:      Aerobic Bottle Volume - 11 mL  Anaerobic Bottle Volume - 11 mL    Blood Culture [[id number]] Collected: 02/07/24 1038    Order Status: Completed Lab Status: Final result Updated: 02/12/24 1501    Specimen: Blood,peripheral      Blood Culture Result No Growth 5 Days    Narrative:      Anaerobic Bottle Volume - 12 mL  Aerobic Bottle Volume - 12 mL    Aerobic Bacterial Culture [[id number]]     Order Status: Sent Lab Status: No result     Specimen: Other from Hematoma     Anaerobic Culture [[id number]]     Order Status: Sent Lab Status: No result     Specimen: Other from Hematoma     Aerobic Bacterial Culture [[id number]] Collected: 02/07/24 1019    Order Status: Completed Lab Status: Final result Updated: 02/09/24 0617    Specimen: Other from Wound      Aerobic Culture Result Mixture of organisms suggestive of normal skin aundrea      No Staph aureus, Beta Strep or Pseudo aeruginosa isolated     Aerobic Smear No WBCs seen      No organisms seen    Aerobic Bacterial Culture [[id number]]  (Abnormal)  (Susceptibility) Collected: 02/07/24 1019    Order Status: Completed Lab Status: Final result Updated: 02/09/24 0617    Specimen: Other from Wound      Aerobic Culture Result Mixture of organisms suggestive of normal skin aundrea      2+ growth Klebsiella pneumoniae ESBL Pos     Aerobic Smear No WBCs seen      No organisms seen    Susceptibility      Klebsiella pneumoniae ESBL Pos      Not Specified     Cefazolin >=64  Resistant     Cefepime  Resistant     Ceftazidime  Resistant     Ceftriaxone 16  Resistant     Ciprofloxacin <=0.25  Sensitive     Gentamicin <=1  Sensitive     Levofloxacin 1  Sensitive     Meropenem <=0.25  Sensitive     Trimethoprim/Sulfa <=20   Sensitive                               H&P - H&P Note      H&P signed by Winsome Hutton MD at 2024  5:55 PM  Version 1 of 1    Author: Winsome Hutton MD Service: — Author Type: Physician    Filed: 2024  5:55 PM Date of Service: 2024  4:57 PM Status: Signed    : Winsome Hutton MD (Physician)                [facility] History and Physical     Gloria Mccrary Patient Status:  Emergency    1950 MRN [id number]   [facility] EMERGENCY DEPARTMENT Attending Brittney Hancock MD   Hosp Day # 0 PCP Gordo Antonio MD     Chief Complaint: L sided abdominal pain    Subjective:    Gloria Mccrary is a 73 year old female with mmp including but not limited to HTN/HL, asthma, seizure disorder, OA, MDD in partial remission is admitted for L sided abdominal pain. Of note, had lumbar surgery a few weeks ago. Hurts to move, breathe. Was able to see spine on post op visit end of January and was doing ok. Says 2 days ago, was to have staples removed but was in too much pain to go to office. When seen, no cp/sob/n/f/d/dysuria. Says had such severe pain, she vomited.     History/Other:        History/Other:    Past Medical History:  Past Medical History:   Diagnosis Date    ASTHMA     Asthma     Back problem     Calculus of kidney     Complex partial seizure disorder (HCC)     DEPRESSION     Disorder of thyroid     High blood pressure     High cholesterol     History of blood transfusion     HYPERLIPIDEMIA     HYPERTENSION     Macular degeneration     Muscle weakness     NAVEEN (obstructive sleep apnea) DMG REDX 7-6-10     AHI 17  RDI 20  REM AHI 34  YULY 76%    OSTEOARTHRITIS     Osteoarthritis     Seizure disorder (HCC)     last seizure was 2018    Sleep apnea     no device     Past Surgical History:   Past Surgical History:   Procedure Laterality Date    BENIGN BIOPSY LEFT      15-20 years ago    CATARACT      COLONOSCOPY      COLONOSCOPY,BIOPSY   2007    Performed by MARY JO RUSH at [facility]    HYSTERECTOMY      KNEE REPLACEMENT SURGERY      Right 2010    KNEE REPLACEMENT SURGERY Left 2017    Total    LUMBAR SPINE FUSION,ANTER APPRCH      OTHER SURGICAL HISTORY      Lumbar 3-4 ingrid laminectomy. microdicectomy      Family History:   Family History   Problem Relation Age of Onset    Hypertension Mother     Diabetes Mother     Breast Cancer Other 60        mat aunt    Breast Cancer Maternal Grandmother 40        Age at dx 40s     Social History:    reports that she quit smoking about 34 years ago. Her smoking use included cigarettes. She has a 15 pack-year smoking history. She has never used smokeless tobacco. She reports that she does not drink alcohol and does not use drugs.       Allergies:   Allergies   Allergen Reactions    Meloxicam CONFUSION       Medications:    Current Facility-Administered Medications on File Prior to Encounter   Medication Dose Route Frequency Provider Last Rate Last Admin    [COMPLETED] ceFAZolin (Ancef) 2 g in 20mL IV syringe premix  2 g Intravenous Once Sharon Gomez MD   2 g at 24 1155    [] sodium chloride 0.9 % IV bolus 500 mL  500 mL Intravenous Once PRN Chiqui Hernandez PA        [COMPLETED] ceFAZolin (Ancef) 2 g in 20mL IV syringe premix  2 g Intravenous Q8H Chiqui Hernandez PA   2 g at 24 0538    [COMPLETED] diazepam (Valium) 5 mg/mL injection 2.5 mg  2.5 mg Intravenous Q10 Min PRN Kaya Myers MD   2.5 mg at 24 1510    [COMPLETED] fentaNYL (Sublimaze) 50 mcg/mL injection             [COMPLETED] ondansetron (Zofran) 4 MG/2ML injection             [COMPLETED] clonidine-EPINEPHrine-ropivacaine-ketorolac pain cocktail syringe (OR)   Injection Once (Intra-Op) Sharon Gomez MD   Given at 24 1403     Current Outpatient Medications on File Prior to Encounter   Medication Sig Dispense Refill    acetaminophen 325 MG Oral Tab Take 2 tablets (650 mg  total) by mouth every 6 (six) hours as needed for Pain.      docusate sodium 100 MG Oral Cap Take 100 mg by mouth 2 (two) times daily.      polyethylene glycol, PEG 3350, 17 g Oral Powd Pack Take 17 g by mouth daily as needed.      tiZANidine 2 MG Oral Tab Take 1 tablet (2 mg total) by mouth every 6 (six) hours as needed.      metoprolol succinate ER 25 MG Oral Tablet 24 Hr Take 1 tablet (25 mg total) by mouth daily.      diphenhydrAMINE (BENADRYL ALLERGY) 25 MG Oral Cap Take 1 capsule (25 mg total) by mouth every 6 (six) hours as needed for Itching.      acetaminophen-codeine 300-30 MG Oral Tab Take 1 tablet by mouth every 4 (four) hours as needed for Pain.      amLODIPine 10 MG Oral Tab Take 1 tablet (10 mg total) by mouth daily.      atorvastatin 40 MG Oral Tab Take 1 tablet (40 mg total) by mouth every morning.      LOSARTAN 100 MG Oral Tab TAKE 1 TABLET BY MOUTH  DAILY 90 tablet 0    DULoxetine HCl 20 MG Oral Cap DR Particles Take 1 capsule (20 mg total) by mouth daily. 90 capsule 3    Levothyroxine Sodium 75 MCG Oral Tab Take 1 tablet (75 mcg total) by mouth daily. 90 tablet 3    fluticasone-salmeterol 250-50 MCG/DOSE Inhalation Aerosol Powder, Breath Activated Inhale 1 puff into the lungs 2 (two) times daily. 60 each 3    Multiple Vitamins-Minerals (OCUTABS-LUTEIN) Oral Tab Take 1 tablet by mouth daily.         Review of Systems:   A comprehensive 14 point review of systems was completed.    Pertinent positives and negatives noted in the HPI.    Objective:     BP (!) 167/80   Pulse 86   Temp 98.6 °F (37 °C) (Oral)   Resp 15   Ht 5' 3\" (1.6 m)   Wt 190 lb (86.2 kg)   SpO2 97%   BMI 33.66 kg/m²      General:  Alert, NAD, appears stated age, no accessory m use, trying to stay still, non toxic appearing   Head:  Normocephalic, without obvious abnormality, atraumatic.   Eyes:  Sclera anicteric,  EOMs intact. Lids wnl.    Ears, nose, throat:  external ears and nose within normal limits, hearing intact        Neck: Supple, symmetrical   Lungs:   Clear to auscultation bilaterally. ok effort   Chest wall:  No tenderness or deformity.   Heart:  Regular rate and rhythm, S1, S2 normal,no LE edema   Abdomen:   Soft, non-tender. Bowel sounds   . Non distended, no peritoneal signs   Extremities: Extremities normal, atraumatic, no edema.   Skin: Skin color, texture, turgor normal. No visible rashes     Neurologic:  Psychiatric: No facial droop or dysarthria  appropriate affect,  memory ok       Results:         CBC/Chem  Recent Labs   Lab 24  1038   WBC 18.2*   HGB 11.9*   MCV 97.7   .0*   INR 1.21*       Recent Labs   Lab 24  1038      K 4.1      CO2 29.0   BUN 29*   CREATSERUM 1.16*   *   CA 9.5       Recent Labs   Lab 24  1038   ALT 26   AST 15   ALB 2.7*          Additional Diagnostics: [id number] NSR HR 85       Radiology: CT ABDOMEN+PELVIS(CONTRAST ONLY)(CPT=74177)    Result Date: 2024       CONCLUSION:  1. Loculated fluid collections around the left psoas muscle are noted and most likely a resolved fluid/hemorrhage from recent surgery.  Correlation with clinical findings is necessary to exclude infected fluid collection. 2. Moderate to marked distension right colon and cecum with large amount of fecal debris consistent with constipation. 3. Luminal distention of gallbladder is noted without specific evidence of cholecystitis or calcified cholelithiasis. 4. Small left pleural effusion with dependent atelectasis left lower lobe.              COVID-19  Lab Results   Component Value Date    COVID19 Not Detected 2023    COVID19 NOT DETECTED 2020    COVID19 NOT DETECTED 10/31/2020          Assessment & Plan:     73 year old female with mmp including but not limited to HTN/HL, asthma, seizure disorder, OA, MDD in partial remission is admitted for L sided abdominal pain.     **L sided abdominal pain secondary to  **loculated fluid collections around psoas muscle,  possible fluid/hemorrhage resolving from recent surgery  -spine and gen surg consulted in ED, appreciate  -pt afebrile and nontoxic appearing. Hold off on abx for now  -BC, cx pending    **constipation as noted on CT a/p    **leukocytosis-suspect reactive    Stable chronic illnesses:  HTN/HL-home meds. Reassess ARB for AM  Seizure disorder-not on meds  MDD in partial remission-home med    PPx-scds for now    Dw pt and ED MD            Thank You,  Winsome Hutton MD    [facility] Hospitalist  Internal Medicine  Answering Service number: [phone]    2/7/2024    Outpatient records or previous hospital records reviewed.     Further recommendations pending patient's clinical course.  [facility] hospitalist to continue to follow patient while in house    Patient and/or patient's family given opportunity to ask questions and note understanding and agreeing with therapeutic plan as outlined    Supplementary Documentation:                               Electronically signed by Winsome Hutton MD on 2/7/2024  5:55 PM              Consults - MD Consult Notes      Consults signed by Aneesh Perera MD at 2/12/2024 12:52 PM      Author: Aneesh Perera MD Service: Infectious Disease Author Type: Physician    Filed: 2/12/2024 12:52 PM Status: Signed    : Aneesh Perera MD (Physician)       [facility]    PATIENT'S NAME: KEVIN DODSON   ATTENDING PHYSICIAN: ASHLEE Holland M.D.   CONSULTING PHYSICIAN: Aneesh Perera M.D.   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  She had previous lumbar surgery on January 18, underwent a lumbar fusion with old hardware removed and new hardware inserted.   One week later, she went to the [facility] Emergency Room on 01/25 where she was having pain.  There was one mention of some foul-smelling drainage.  She was not admitted or transferred.  She was then seen as an outpatient where mention of the wound looking adequate, and she was given 7 days of Keflex as prophylaxis.  She is now here with what was described as foul-smelling drainage and back pain.  Her pain is actually more lateral in left flank.  There is a culture which I am not sure if it came from the wound, but I think it did and it is growing an ESBL Klebsiella.  Currently, she is being seen in the IR suite where she has been placed on her stomach for IR drainage of a psoas area fluid collection which has been seen on the CT and MRI.  I can elicit no other GI, , cardiovascular, CNS, or respiratory symptoms at this time.    PAST MEDICAL HISTORY:  She is overweight.  There is asthma, kidney stone, complex partial seizure disorder many many years ago.  She does not take any medications for this.  Thyroid issues, blood pressure, cholesterol, lipids, macular degeneration, sleep apnea, DJD, last seizure 2018.    PAST SURGICAL HISTORY:  Hysterectomy, right and left knees have been replaced, lumbar fusion anterior approach 2005, and L3-4 hemilaminectomy 1993.    MEDICATIONS:  Currently off antibiotics.  She was admitted here on the 7th.      ALLERGIES:  Meloxicam.    SOCIAL HISTORY:  Negative cigarettes and alcohol.    FAMILY HISTORY:  Unrevealing.    REVIEW OF SYSTEMS:  Overweight.      PHYSICAL EXAMINATION:    GENERAL:  This is a well-developed woman, pale, but in good spirits, though in much back and left flank area pain.    VITAL SIGNS:  She is afebrile.  Other vital signs stable.  HEENT:  Pale conjunctiva.  NECK:  Supple.  LUNGS:  Clear.  HEART:  Not heard by me.  ABDOMEN:  Not examined by me.  EXTREMITIES:  No phlebitic areas are appreciated.    BACK:  Examined.  The midline wound has some induration and fullness.   Minimal amount of discoloration without drainage.  The left lateral wound has staples in place.  The margins look redder, and there is some involving necrotic tissue at the edges.  This area is also full.  Where her pain is laterally, there is no lesion noted.    NEUROLOGIC:  Not further tested by me.    LABORATORY DATA:  White count 18.5, hemoglobin 12.3, platelets 497.  INR is 1.21.  BUN and creatinine of 24 and 0.88.  Blood cultures are negative and aerobic bacterial culture has a skin aundrea and an ESBL Klebsiella.      The CT scan was reviewed with IR Service.  The lateral wound is felt to be the stapled area and the fluid collection in the psoas area on the left is not formally seen to communicate with this lateral area.    IMPRESSION:    1.   A postoperative complication with a potential evolving wound infection.  There is increasing pain separate from the wound itself.  A superficial culture presumably had skin aundrea and an ESBL Klebsiella.  The 02/07 CT showed psoas muscle fluid collection.  This was reviewed with the IR Service.  No clear connection to the wound is noted, although I am suspicious.  I am in agreement with draining of this area which is being done at the time of this dictation.  A formal incision and drainage by the Surgical Service may or may not be needed.  2.   There is a history of a partial seizure.  She has been off all medications.  In my opinion, the benefits of giving a carbapenem outweigh the risks, so meropenem will be initiated.  One of the newer cephalosporins might also be a consideration if we run into trouble with the carbapenem.  I have spoken with the patient, nurse and IR Service including about the antibiotic.  Further suggestions to follow.    Thank you very much for allowing me to see this patient.     Dictated By Aneesh Perera M.D.  d: 02/09/2024 17:04:38  t: 02/09/2024 17:14:09  Job [id number]  EMS/    Electronically signed by Aneesh Perera MD on  2/12/2024 12:52 PM              Discharge Summary - D/C Summary      Discharge Summary signed by Zander Mathews DO at 2/16/2024  5:39 PM  Version 1 of 1    Author: Zander Mathews DO Service: Hospitalist Author Type: Physician    Filed: 2/16/2024  5:39 PM Date of Service: 2/16/2024  4:46 PM Status: Signed    : Zander Mathews DO (Physician)                                                            General Medicine Discharge Summary     Patient ID:  Gloria Mccrary  73 year old  6/28/1950    Admit date: 2/7/2024    Discharge date and time: 2/16/2023    Attending Physician: Edi Summers MD / Reid    Primary Care Physician: Gordo Antonio MD     Discharge Diagnoses:       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 ACO and [facility] HMOs will be handled by the [facility] Care Management team.  For all other patients, please follow usual protocol for discharge care transition.    Secondary Diagnoses: None    Risk of readmission: Gloria Mccrary has Moderate Risk of readmission after discharge from the hospital.    Discharge Condition: stable    Disposition: home    Important Follow up:  - PCP - appt scheduled for 02/19  - Consults:     Gordo Antonio MD  [address]  [address]  [facility] [address] [phone]    Follow up on 2/19/2024  at 1130    Ayanna Valdez MD  [address]  [place] [address]  [phone]    Schedule an appointment as soon as possible for a visit in 2 week(s)      Sharon Gomez MD  [address]  [place] [address]  [phone]    Follow up in 1 week(s)  As needed    Gordo Martinez MD  [address]  [facility] [address]  [place] [address]  [phone]    Follow up  As needed    - Labs:  none  - Radiology: none    Hospital Course:          73 year old female with mmp including but not limited to HTN/HL, asthma, seizure disorder, OA, MDD in partial remission is admitted for L sided abdominal pain.      **drowsiness, AMS- suspect secondary to IV dilaudid- RESOLVED  -thought to be from opioids      **L sided abdominal pain secondary to  **loculated fluid collections around psoas muscle, abscess sp IR drainage of 53 ml of pus on 02/09  -ID, spine on, appreciate. Seen by gen surg, appreciate  -BC, incision drainage cx with ESBL. Pct minimally elevated.     -ID consult, appreciate-on meropenem in hospital                 - dc on IV abx via PICC  -02/15 complained of worsening pain but had not used norco as much as she could. Repeat imaging was done - psoas fluid collection is smaller but has a new small fluid collection in the L paraspinal area. Reynaldo ADORNO with spine, who discussed with Dr Gomez, no further imaging or procedures recommended. Rec to cont abx.   -on 02/16 her pain is improved with more regular pain regimen. She also had good bm per my dw nurses, so could have contributed. She was seen by PT . Dw SW, pt does not qualify for rehab.   Dw sister over phone. Per my dw sister pt has had this pain for a year, same location / area. It was thought to be the back thus she had the surgery.  She has also seen pain specialist as outpt. Pt confirmed that this pain has been present for a year, comes and goes. Was relived with diclofenac but her spine surgery had rec against this medication. Her pain is worse with movement, at rest is 1/10. Suspect abscess now is also contributing to this symptoms. No n/v. She is tolerating diet. Dw pt to return if having neurogogcal chagnes, worsening pain.    **constipation as noted on CT a/p. Bowel regimen     **hypoxia- suspect splinting from pain and narcotic effect. Asymptomatic. IS as able. Monitor- RESOLVED     **leukocytosis-suspect reactive.  Resolved     Stable chronic illnesses:  HTN/HL-home meds. Arb resumed. Bp controlled  Seizure disorder-not on meds  MDD in partial remission-home med      Consults: IP CONSULT TO ORTHOPEDIC SURGERY  IP CONSULT TO GENERAL SURGERY  NURSING CONSULT TO DIETITIAN  IP CONSULT TO SOCIAL WORK  IP CONSULT TO INFECTIOUS DISEASE  IP CONSULT TO VASCULAR ACCESS TEAM  IP CONSULT TO VASCULAR ACCESS TEAM  IP CONSULT TO SOCIAL WORK  IP CONSULT TO SOCIAL WORK    Operative Procedures:        Patient instructions:      Current Discharge Medication List        START taking these medications    Details   cyclobenzaprine 10 MG Oral Tab Take 1 tablet (10 mg total) by mouth 3 (three) times daily as needed for Muscle spasms.      HYDROcodone-acetaminophen  MG Oral Tab Take 1 tablet by mouth every 6 (six) hours as needed.      ertapenem 1 g Injection Recon Soln Inject 1 g into the vein daily for 24 days. Invanz 1 gm IVPB daily for four weeks, CBC CMP CRP weekly while on IV abx           CONTINUE these medications which have NOT CHANGED    Details   metoprolol succinate ER 25 MG Oral Tablet 24 Hr Take 1 tablet (25 mg total) by mouth daily.      amLODIPine 10 MG Oral Tab Take 1 tablet (10 mg total) by mouth daily.      atorvastatin 40 MG Oral Tab Take 1 tablet (40 mg total) by mouth every morning.      LOSARTAN 100 MG Oral Tab TAKE 1 TABLET BY MOUTH  DAILY      DULoxetine HCl 20 MG Oral Cap DR Particles Take 1 capsule (20 mg total) by mouth daily.      Levothyroxine Sodium 75 MCG Oral Tab Take 1 tablet (75 mcg total) by mouth daily.      acetaminophen 325 MG Oral Tab Take 2 tablets (650 mg total) by mouth every 6 (six) hours as needed for Pain.      docusate sodium 100 MG Oral Cap Take 100 mg by mouth 2 (two) times daily.      polyethylene glycol, PEG 3350, 17 g Oral Powd Pack Take 17 g by mouth daily as needed.      fluticasone-salmeterol 250-50 MCG/DOSE Inhalation Aerosol Powder, Breath Activated Inhale 1 puff into the lungs 2 (two)  times daily.      Multiple Vitamins-Minerals (OCUTABS-LUTEIN) Oral Tab Take 1 tablet by mouth daily.           STOP taking these medications       diphenhydrAMINE (BENADRYL ALLERGY) 25 MG Oral Cap        tiZANidine 2 MG Oral Tab        acetaminophen-codeine 300-30 MG Oral Tab                 Activity: activity as tolerated  Diet: cardiac diet  Wound Care: as directed  Code Status: No Order      Exam on day of discharge:     Vitals:    02/16/24 1617   BP: 105/56   Pulse: 95   Resp: 16   Temp: 98.8 °F (37.1 °C)       General: no acute distress, alert and oriented x 3  Heart: RRR  Lungs: clear bilaterally, no active wheezing  Abdomen: mild ttp in left lower abd- improved from before   Extremities: no pedal edema   Neuro: CN inact, no focal deficits. Strength symmetrical in LEs      Total time coordinating care for discharge: Greater than 32 min      NHI Mathews DO        Electronically signed by Zander Mathews DO on 2/16/2024  5:39 PM              Physical Therapy Notes (last 72 hours)      Physical Therapy Note signed by Dawna Colin PTA at 2/16/2024  3:35 PM  Version 1 of 1    Author: Dawna Colin PTA Service: Rehab Author Type: Physical Therapy Assistant    Filed: 2/16/2024  3:35 PM Date of Service: 2/16/2024  3:33 PM Status: Signed    : Dawna Colin PTA (Physical Therapy Assistant)       Pt remains up at SBA level with use of RW - Pt reported high levels of pain with any and all movement.  Pt has been cleared for dc from mobility standpoint since 2/15.  Discussed with ERMA Bush       Physical Therapy Note signed by Dawna Colin PTA at 2/15/2024 12:39 PM  Version 1 of 1    Author: Dawna Colin PTA Service: Rehab Author Type: Physical Therapy Assistant    Filed: 2/15/2024 12:39 PM Date of Service: 2/15/2024 12:34 PM Status: Signed    : Dawna Colin PTA (Physical Therapy Assistant)          PHYSICAL THERAPY TREATMENT NOTE - INPATIENT    Room Number: 356/356-A     Session: 2      Number of Visits to Meet Established Goals: 5    Presenting Problem: abdominal pain  Co-Morbidities : HTN/HL, MDD, asthma, seizure disorder, OA       Prior Level of function: Prior to lumbar surgery, pt was using a can, and sometimes RW. Pt was Mod I for ADL. Pt uses electric scooter at the store. Since surgery, pt is using RW for ambulation, and Mod I for ADL.     ASSESSMENT   Patient demonstrates fair progress this session, goals  remain in progress.    Patient continues to function near baseline with bed mobility, transfers, and gait.  Contributing factors to remaining limitations include pain.  Next session anticipate patient to progress gait.  Physical Therapy will continue to follow patient for duration of hospitalization.    Patient continues to benefit from continued skilled PT services: at discharge to promote functional independence and safety with additional support and return home with home health PT.    PLAN  PT Treatment Plan: Bed mobility;Body mechanics;Endurance;Energy conservation;Patient education;Family education;Gait training;Strengthening;Stair training;Transfer training;Balance training  Rehab Potential : Good  Frequency (Obs): Daily    CURRENT GOALS     Goal #1 Patient is able to demonstrate supine - sit EOB @ level: modified independent      Goal #2 Patient is able to demonstrate transfers Sit to/from Stand at assistance level: modified independent      Goal #3 Patient is able to ambulate 150 feet with assist device: walker - rolling at assistance level: modified independent      Goal #4 Pt to ascend/descend 2 steps with supervision.    Goal #5     Goal #6     Goal Comments: Goals established on 2/10/2024      2/15/2024 all goals ongoing     SUBJECTIVE  \"Yeah this pain just comes in waves, and I am so nervous it will get worse when I move\"    OBJECTIVE  Precautions: Bed/chair alarm;Spine    WEIGHT BEARING RESTRICTION                   PAIN ASSESSMENT   Rating: Unable to rate  Location:  low back pain, and location of drain  Management Techniques: Activity promotion;Relaxation;Repositioning;Body mechanics    BALANCE                                                                                                                       Static Sitting: Good  Dynamic Sitting: Good           Static Standing: Poor +  Dynamic Standing: Poor +    ACTIVITY TOLERANCE                         O2 WALK  Oxygen Therapy  SPO2% on Room Air at Rest: 94      AM-PAC '6-Clicks' INPATIENT SHORT FORM - BASIC MOBILITY  How much difficulty does the patient currently have...  Patient Difficulty: Turning over in bed (including adjusting bedclothes, sheets and blankets)?: None   Patient Difficulty: Sitting down on and standing up from a chair with arms (e.g., wheelchair, bedside commode, etc.): A Little   Patient Difficulty: Moving from lying on back to sitting on the side of the bed?: A Little   How much help from another person does the patient currently need...   Help from Another: Moving to and from a bed to a chair (including a wheelchair)?: A Little   Help from Another: Need to walk in hospital room?: A Little   Help from Another: Climbing 3-5 steps with a railing?: A Little       AM-PAC Score:  Raw Score: 19   Approx Degree of Impairment: 41.77%   Standardized Score (AM-PAC Scale): 45.44   CMS Modifier (G-Code): CK    FUNCTIONAL ABILITY STATUS  Gait Assessment   Functional Mobility/Gait Assessment  Gait Assistance: Supervision  Distance (ft): 10 x 2, 60  Assistive Device: Rolling walker  Pattern: Shuffle (fear of increased pain)    Skilled Therapy Provided    Bed Mobility:  Rolling: SBA   Supine<>Sit: SBA (light tactile touch to encourage participation in transitional movement)   Sit<>Supine: NT     Transfer Mobility:  Sit<>Stand: SBA   Stand<>Sit: SBA   Gait: SBA with RW    Therapist's Comments: writer clarified Pt's concerns regarding mobility are due to pain, not because of lack of strength.  Writer encouraged Pt to  bring up concerns to medical team.     Pt did well with chair follow provided for confidence - Pt had the verbal control to communicate need for seated break.  Pt's brother witnessed session.       THERAPEUTIC EXERCISES  Lower Extremity Ankle pumps     Upper Extremity  - open/close     Position Sitting     Repetitions   10   Sets   1     Patient End of Session: Up in chair;Needs met;Call light within reach;RN aware of session/findings;All patient questions and concerns addressed;Alarm set;Family present    PT Session Time: 60 minutes  Gait Training: 15 minutes  Therapeutic Activity: 30 minutes  Therapeutic Exercise: 0 minutes   Neuromuscular Re-education: 8 minutes                 Physical Therapy Note signed by Dawna Colin PTA at 2/14/2024  9:54 AM  Version 1 of 1    Author: Dawna Colin PTA Service: Rehab Author Type: Physical Therapy Assistant    Filed: 2/14/2024  9:54 AM Date of Service: 2/14/2024  8:30 AM Status: Signed    : Dawna Colin PTA (Physical Therapy Assistant)       Attempted to see Pt this AM - ERMA Way aware of attempt.  Pt requesting pain meds and muscle relaxant prior to OOB mobility.  Pt in agreement to get up with PCT for a walk this AM.  Confirmed with PCT Lindsay - Pt up SBA with RW.      Will f/u later today if time permits, after all other patients are attempted per tentative schedule.                Occupational Therapy Notes (last 72 hours)  Notes from 2/13/2024  6:35 PM through 2/16/2024  6:35 PM   No notes of this type exist for this encounter.     Video Swallow Study Notes    No notes of this type exist for this encounter.     SLP Notes    No notes of this type exist for this encounter.     Immunizations     Name Date      Covid-19 Moderna 10/23/21     Covid-19 Moderna 04/09/21     Covid-19 Moderna 03/12/21     Covid-19 Pfizer 04/16/22     Depo-Medrol 40mg Inj 08/24/09     Fluad 0.5ml 10/23/21     INFLUENZA 11/07/20     INFLUENZA 11/07/20     INFLUENZA 11/01/07      Pneumococcal (Prevnar 13) 06/12/20     Pneumovax 23 07/26/21     TDAP 01/24/17     Zoster Vaccine Recombinant Adjuvanted (Shingrix) 07/26/21     Zoster Vaccine Recombinant Adjuvanted (Shingrix) 12/16/20       Multidisciplinary Problems     Active Goals     Not on file          Resolved Goals        Problem: Patient/Family Goals    Goal Priority Disciplines Outcome Interventions   Patient/Family Long Term Goal   (Resolved)     Interdisciplinary Adequate for Discharge    Description: Patient's Long Term Goal: maintain healthy lifestyle    Interventions:  - Diet  - Exercise  - Self care  - See additional Care Plan goals for specific interventions   Patient/Family Short Term Goal   (Resolved)     Interdisciplinary Adequate for Discharge    Description: Patient's Short Term Goal: Be able to walk    Interventions:   - PT/OT  - Surgery reccs  - Pain control  - See additional Care Plan goals for specific interventions                Within the past 24 hours (including today)

## (undated) DEVICE — MONITORING NEUROPHYSIOLOGICAL

## (undated) DEVICE — Device

## (undated) DEVICE — MANIFOLD SCTN INTELLICART BLUE

## (undated) DEVICE — SUTURE VICRYL 2-0 FSL

## (undated) DEVICE — KIT HEMSTAT MTRX 8ML PORCINE GEL HUM THROM

## (undated) DEVICE — SYSTEM IRRISEPT WND IRR 0.05%

## (undated) DEVICE — LIF ILLUMINATION SYSTEM ENDOSC

## (undated) DEVICE — STAPLER SKIN ROTICULATING PRW3

## (undated) DEVICE — MARKER SKIN PREP RESIST STRL

## (undated) DEVICE — SUTURE VCRL SZ 3-0 L27IN ABSRB UD L19MM FS-2

## (undated) DEVICE — SEALER BPLR ELECTRD L5.74MM DIA3.48MM CNTR

## (undated) DEVICE — SUTURE VICRYL 1 OS-6

## (undated) DEVICE — FORCEPS BPLR LAT IF DISP

## (undated) DEVICE — DRESSING TRNSPAR W6XL8IN FRME STYL HYPOALRG

## (undated) DEVICE — PACK LAMINECTOMY

## (undated) DEVICE — DRESSING TRNSPAR W4XL4.75IN FRME STYL

## (undated) DEVICE — DRAPE EQP 72X42IN CARM POLY

## (undated) DEVICE — GOWN SURG AERO CHROME XXL

## (undated) DEVICE — BUR SUR DIA3MM PRECIS NEURO 5 STP NOTCH

## (undated) DEVICE — SLEEVE COMPR M KNEE LEN SGL USE KENDALL SCD

## (undated) DEVICE — LIGHT HANDLE

## (undated) DEVICE — SYRINGE 3ML LL

## (undated) DEVICE — ADHESIVE SKIN TOP FOR WND CLSR DERMBND ADV

## (undated) DEVICE — WRAP THERAPEUTIC BACK WO GEL P

## (undated) DEVICE — GLOVE SUR 8 PROTEXIS PIP CRM PWD F

## (undated) DEVICE — APPLICATOR SKIN PREP 26ML HI LT ORNG 2% CHG

## (undated) DEVICE — TAPE SURG W4XL198IN E FOAM HIGHLY CNFRM

## (undated) DEVICE — SOLUTION IRRIG 1000ML 0.9% NACL USP BTL

## (undated) DEVICE — DRAPE C ARM TRNSPAR POLY PROTCT BARR FOR UNIV

## (undated) DEVICE — COVER TBL W44XL90IN STD POLYPR REINF DISP

## (undated) DEVICE — KNIFE SRG LACEY BAYONET L170MM

## (undated) DEVICE — DRAPE UTIL L W18XL24IN PLAS STR ADH REINF